# Patient Record
Sex: FEMALE | Race: WHITE | NOT HISPANIC OR LATINO | Employment: UNEMPLOYED | URBAN - METROPOLITAN AREA
[De-identification: names, ages, dates, MRNs, and addresses within clinical notes are randomized per-mention and may not be internally consistent; named-entity substitution may affect disease eponyms.]

---

## 2019-01-22 PROBLEM — S52.322A CLOSED DISPLACED TRANSVERSE FRACTURE OF SHAFT OF LEFT RADIUS: Status: ACTIVE | Noted: 2019-01-22

## 2019-02-05 RX ORDER — OXYCODONE AND ACETAMINOPHEN 10; 325 MG/1; MG/1
1 TABLET ORAL EVERY 4 HOURS PRN
Qty: 42 TABLET | Refills: 0 | Status: CANCELLED | OUTPATIENT
Start: 2019-02-05

## 2019-02-06 RX ORDER — OXYCODONE AND ACETAMINOPHEN 5; 325 MG/1; MG/1
1 TABLET ORAL EVERY 8 HOURS PRN
Qty: 32 TABLET | Refills: 0 | Status: SHIPPED | OUTPATIENT
Start: 2019-02-06 | End: 2019-05-14

## 2019-02-11 ENCOUNTER — HOSPITAL ENCOUNTER (OUTPATIENT)
Dept: RADIOLOGY | Facility: HOSPITAL | Age: 34
Discharge: HOME | End: 2019-02-11
Attending: ORTHOPAEDIC SURGERY
Payer: COMMERCIAL

## 2019-02-11 ENCOUNTER — OFFICE VISIT (OUTPATIENT)
Dept: ORTHOPEDICS | Facility: CLINIC | Age: 34
End: 2019-02-11
Payer: COMMERCIAL

## 2019-02-11 VITALS
HEART RATE: 108 BPM | HEIGHT: 62 IN | WEIGHT: 233.44 LBS | DIASTOLIC BLOOD PRESSURE: 127 MMHG | SYSTOLIC BLOOD PRESSURE: 197 MMHG | BODY MASS INDEX: 42.96 KG/M2

## 2019-02-11 DIAGNOSIS — S52.322A CLOSED DISPLACED TRANSVERSE FRACTURE OF SHAFT OF LEFT RADIUS, INITIAL ENCOUNTER: ICD-10-CM

## 2019-02-11 DIAGNOSIS — S52.322D CLOSED DISPLACED TRANSVERSE FRACTURE OF SHAFT OF LEFT RADIUS WITH ROUTINE HEALING, SUBSEQUENT ENCOUNTER: Primary | ICD-10-CM

## 2019-02-11 DIAGNOSIS — S52.322A CLOSED DISPLACED TRANSVERSE FRACTURE OF SHAFT OF LEFT RADIUS, INITIAL ENCOUNTER: Primary | ICD-10-CM

## 2019-02-11 PROCEDURE — 99024 POSTOP FOLLOW-UP VISIT: CPT | Mod: S$GLB,,, | Performed by: ORTHOPAEDIC SURGERY

## 2019-02-11 PROCEDURE — 73090 X-RAY EXAM OF FOREARM: CPT | Mod: 26,LT,, | Performed by: RADIOLOGY

## 2019-02-11 PROCEDURE — 99024 PR POST-OP FOLLOW-UP VISIT: ICD-10-PCS | Mod: S$GLB,,, | Performed by: ORTHOPAEDIC SURGERY

## 2019-02-11 PROCEDURE — 73090 X-RAY EXAM OF FOREARM: CPT | Mod: TC,PO,LT

## 2019-02-11 PROCEDURE — 99999 PR PBB SHADOW E&M-EST. PATIENT-LVL III: CPT | Mod: PBBFAC,,, | Performed by: ORTHOPAEDIC SURGERY

## 2019-02-11 PROCEDURE — 99999 PR PBB SHADOW E&M-EST. PATIENT-LVL III: ICD-10-PCS | Mod: PBBFAC,,, | Performed by: ORTHOPAEDIC SURGERY

## 2019-02-11 PROCEDURE — 73090 XR FOREARM LEFT: ICD-10-PCS | Mod: 26,LT,, | Performed by: RADIOLOGY

## 2019-02-11 RX ORDER — CELECOXIB 200 MG/1
200 CAPSULE ORAL DAILY
Refills: 5 | Status: ON HOLD | COMMUNITY
Start: 2019-01-15 | End: 2019-08-02 | Stop reason: HOSPADM

## 2019-02-11 RX ORDER — LINAGLIPTIN 5 MG/1
5 TABLET, FILM COATED ORAL DAILY
Refills: 5 | COMMUNITY
Start: 2019-01-15 | End: 2019-07-17

## 2019-02-11 RX ORDER — METFORMIN HYDROCHLORIDE 1000 MG/1
1000 TABLET ORAL 2 TIMES DAILY
Refills: 5 | COMMUNITY
Start: 2019-01-15 | End: 2019-05-14

## 2019-02-11 RX ORDER — LITHIUM CARBONATE 450 MG/1
450 TABLET ORAL 2 TIMES DAILY
Refills: 2 | COMMUNITY
Start: 2018-12-06 | End: 2019-05-14

## 2019-02-11 RX ORDER — PROPRANOLOL HYDROCHLORIDE 10 MG/1
10 TABLET ORAL 2 TIMES DAILY
Refills: 7 | COMMUNITY
Start: 2018-12-06 | End: 2019-05-14

## 2019-02-11 NOTE — PROGRESS NOTES
Subjective:      Patient ID: Alisha Kirk is a 33 y.o. female.    Chief Complaint: Post-op Evaluation of the Left Forearm and Post-op Evaluation (s/p ORIF radial shaft fx 19)    Doing fairly well today. She rates her pain as 7/10 today.     Social History     Occupational History    Not on file   Tobacco Use    Smoking status: Former Smoker     Packs/day: 0.50     Years: 3.00     Pack years: 1.50     Types: Cigarettes     Last attempt to quit:      Years since quittin.1    Smokeless tobacco: Never Used   Substance and Sexual Activity    Alcohol use: Yes     Comment: One glass of wine once a month    Drug use: No    Sexual activity: Yes     Partners: Male     Birth control/protection: None            Objective:    Ortho Exam     LUE: good motion of the fingers and the wrist. Incision is well healed. + ttp at the fracture site.       XRAYS: 2 views of left forearm obtained and reviewed today reveal good alignment Hardware is intact  .       Assessment:         s/p ORIF left radial shaft frx  Plan:     Pt's blood pressure was 197/127 today. I recommended she go to Urgent care or ER to have it treated.      We performed a custom orthotic/brace adjustment, fitting and training with the patient today. The patient demonstrated understanding and proper care. This was performed for 15 minutes.  Long titan wrist brace  was given.   Ok to return to work, no lifting. F/u 3 weeks with xrays 2 views left forearm.

## 2019-02-12 ENCOUNTER — PATIENT MESSAGE (OUTPATIENT)
Dept: ORTHOPEDICS | Facility: CLINIC | Age: 34
End: 2019-02-12

## 2019-02-12 RX ORDER — TRAMADOL HYDROCHLORIDE 50 MG/1
50 TABLET ORAL EVERY 8 HOURS PRN
Qty: 42 TABLET | Refills: 0 | Status: SHIPPED | OUTPATIENT
Start: 2019-02-12 | End: 2019-02-26 | Stop reason: SDUPTHER

## 2019-02-12 RX ORDER — OXYCODONE AND ACETAMINOPHEN 5; 325 MG/1; MG/1
1 TABLET ORAL EVERY 8 HOURS PRN
Qty: 32 TABLET | Refills: 0 | Status: CANCELLED | OUTPATIENT
Start: 2019-02-12

## 2019-02-26 RX ORDER — TRAMADOL HYDROCHLORIDE 50 MG/1
50 TABLET ORAL EVERY 8 HOURS PRN
Qty: 42 TABLET | Refills: 0 | Status: SHIPPED | OUTPATIENT
Start: 2019-02-26 | End: 2019-05-14

## 2019-03-04 DIAGNOSIS — S52.322D CLOSED DISPLACED TRANSVERSE FRACTURE OF SHAFT OF LEFT RADIUS WITH ROUTINE HEALING, SUBSEQUENT ENCOUNTER: Primary | ICD-10-CM

## 2019-03-07 ENCOUNTER — OFFICE VISIT (OUTPATIENT)
Dept: ORTHOPEDICS | Facility: CLINIC | Age: 34
End: 2019-03-07
Payer: COMMERCIAL

## 2019-03-07 ENCOUNTER — HOSPITAL ENCOUNTER (OUTPATIENT)
Dept: RADIOLOGY | Facility: HOSPITAL | Age: 34
Discharge: HOME | End: 2019-03-07
Attending: ORTHOPAEDIC SURGERY
Payer: COMMERCIAL

## 2019-03-07 VITALS
BODY MASS INDEX: 42.96 KG/M2 | DIASTOLIC BLOOD PRESSURE: 84 MMHG | WEIGHT: 233.44 LBS | SYSTOLIC BLOOD PRESSURE: 123 MMHG | HEART RATE: 67 BPM | HEIGHT: 62 IN

## 2019-03-07 DIAGNOSIS — S52.322D CLOSED DISPLACED TRANSVERSE FRACTURE OF SHAFT OF LEFT RADIUS WITH ROUTINE HEALING, SUBSEQUENT ENCOUNTER: ICD-10-CM

## 2019-03-07 DIAGNOSIS — S52.322D CLOSED DISPLACED TRANSVERSE FRACTURE OF SHAFT OF LEFT RADIUS WITH ROUTINE HEALING, SUBSEQUENT ENCOUNTER: Primary | ICD-10-CM

## 2019-03-07 PROCEDURE — 73090 XR FOREARM LEFT: ICD-10-PCS | Mod: 26,LT,, | Performed by: RADIOLOGY

## 2019-03-07 PROCEDURE — 99999 PR PBB SHADOW E&M-EST. PATIENT-LVL III: ICD-10-PCS | Mod: PBBFAC,,, | Performed by: ORTHOPAEDIC SURGERY

## 2019-03-07 PROCEDURE — 73090 X-RAY EXAM OF FOREARM: CPT | Mod: TC,PO,LT

## 2019-03-07 PROCEDURE — 99999 PR PBB SHADOW E&M-EST. PATIENT-LVL III: CPT | Mod: PBBFAC,,, | Performed by: ORTHOPAEDIC SURGERY

## 2019-03-07 PROCEDURE — 99024 PR POST-OP FOLLOW-UP VISIT: ICD-10-PCS | Mod: S$GLB,,, | Performed by: ORTHOPAEDIC SURGERY

## 2019-03-07 PROCEDURE — 73090 X-RAY EXAM OF FOREARM: CPT | Mod: 26,LT,, | Performed by: RADIOLOGY

## 2019-03-07 PROCEDURE — 99024 POSTOP FOLLOW-UP VISIT: CPT | Mod: S$GLB,,, | Performed by: ORTHOPAEDIC SURGERY

## 2019-03-07 RX ORDER — LEVOTHYROXINE SODIUM 50 UG/1
50 TABLET ORAL DAILY
Refills: 2 | COMMUNITY
Start: 2019-02-12

## 2019-03-07 NOTE — PROGRESS NOTES
Subjective:      Patient ID: Alisha Kirk is a 33 y.o. female.    Chief Complaint: Post-op Evaluation of the Left Forearm and Post-op Evaluation (s/p ORIF radial shaft fx 19)    Doing  well today. She rates her pain as 4/10 today.   She says she is doing well at work.   Social History     Occupational History    Not on file   Tobacco Use    Smoking status: Former Smoker     Packs/day: 0.50     Years: 3.00     Pack years: 1.50     Types: Cigarettes     Last attempt to quit:      Years since quittin.1    Smokeless tobacco: Never Used   Substance and Sexual Activity    Alcohol use: Yes     Comment: One glass of wine once a month    Drug use: No    Sexual activity: Yes     Partners: Male     Birth control/protection: None            Objective:    Ortho Exam     LUE: Mildly decreased motion of the fingers and the wrist. Incision is well healed. Minimal ttp at the fracture site.       XRAYS: 2 views of left forearm obtained and reviewed today reveal good alignment Hardware is intact  . There is some interval progression of healing.       Assessment:         s/p ORIF left radial shaft frx  Plan:       OT . No passive pronation. F/u 6 weeks with xray of the left forearm.

## 2019-04-16 DIAGNOSIS — S52.322D CLOSED DISPLACED TRANSVERSE FRACTURE OF SHAFT OF LEFT RADIUS WITH ROUTINE HEALING, SUBSEQUENT ENCOUNTER: Primary | ICD-10-CM

## 2019-04-17 ENCOUNTER — PATIENT MESSAGE (OUTPATIENT)
Dept: ORTHOPEDICS | Facility: CLINIC | Age: 34
End: 2019-04-17

## 2019-05-08 ENCOUNTER — PATIENT MESSAGE (OUTPATIENT)
Dept: ORTHOPEDICS | Facility: CLINIC | Age: 34
End: 2019-05-08

## 2019-05-14 ENCOUNTER — HOSPITAL ENCOUNTER (OUTPATIENT)
Dept: RADIOLOGY | Facility: HOSPITAL | Age: 34
Discharge: HOME | End: 2019-05-14
Attending: ORTHOPAEDIC SURGERY
Payer: COMMERCIAL

## 2019-05-14 ENCOUNTER — OFFICE VISIT (OUTPATIENT)
Dept: ORTHOPEDICS | Facility: CLINIC | Age: 34
End: 2019-05-14
Payer: COMMERCIAL

## 2019-05-14 VITALS
HEART RATE: 89 BPM | SYSTOLIC BLOOD PRESSURE: 133 MMHG | BODY MASS INDEX: 42.96 KG/M2 | DIASTOLIC BLOOD PRESSURE: 89 MMHG | WEIGHT: 233.44 LBS | HEIGHT: 62 IN

## 2019-05-14 DIAGNOSIS — S52.322D CLOSED DISPLACED TRANSVERSE FRACTURE OF SHAFT OF LEFT RADIUS WITH ROUTINE HEALING, SUBSEQUENT ENCOUNTER: Primary | ICD-10-CM

## 2019-05-14 DIAGNOSIS — S52.322D CLOSED DISPLACED TRANSVERSE FRACTURE OF SHAFT OF LEFT RADIUS WITH ROUTINE HEALING, SUBSEQUENT ENCOUNTER: ICD-10-CM

## 2019-05-14 PROCEDURE — 3008F PR BODY MASS INDEX (BMI) DOCUMENTED: ICD-10-PCS | Mod: CPTII,S$GLB,, | Performed by: ORTHOPAEDIC SURGERY

## 2019-05-14 PROCEDURE — 73090 X-RAY EXAM OF FOREARM: CPT | Mod: TC,PO,LT

## 2019-05-14 PROCEDURE — 99214 PR OFFICE/OUTPT VISIT, EST, LEVL IV, 30-39 MIN: ICD-10-PCS | Mod: S$GLB,,, | Performed by: ORTHOPAEDIC SURGERY

## 2019-05-14 PROCEDURE — 99214 OFFICE O/P EST MOD 30 MIN: CPT | Mod: S$GLB,,, | Performed by: ORTHOPAEDIC SURGERY

## 2019-05-14 PROCEDURE — 73090 X-RAY EXAM OF FOREARM: CPT | Mod: 26,LT,, | Performed by: RADIOLOGY

## 2019-05-14 PROCEDURE — 99999 PR PBB SHADOW E&M-EST. PATIENT-LVL III: CPT | Mod: PBBFAC,,, | Performed by: ORTHOPAEDIC SURGERY

## 2019-05-14 PROCEDURE — 99999 PR PBB SHADOW E&M-EST. PATIENT-LVL III: ICD-10-PCS | Mod: PBBFAC,,, | Performed by: ORTHOPAEDIC SURGERY

## 2019-05-14 PROCEDURE — 3008F BODY MASS INDEX DOCD: CPT | Mod: CPTII,S$GLB,, | Performed by: ORTHOPAEDIC SURGERY

## 2019-05-14 PROCEDURE — 73090 XR FOREARM LEFT: ICD-10-PCS | Mod: 26,LT,, | Performed by: RADIOLOGY

## 2019-05-14 RX ORDER — RISPERIDONE 2 MG/1
2 TABLET ORAL DAILY
Refills: 0 | COMMUNITY
Start: 2019-05-07 | End: 2019-12-19

## 2019-05-14 RX ORDER — BUSPIRONE HYDROCHLORIDE 15 MG/1
15 TABLET ORAL 3 TIMES DAILY
Refills: 0 | COMMUNITY
Start: 2019-05-07 | End: 2020-05-14

## 2019-05-14 RX ORDER — ERGOCALCIFEROL 1.25 MG/1
50000 CAPSULE ORAL
Refills: 0 | COMMUNITY
Start: 2019-04-18

## 2019-05-14 RX ORDER — ESCITALOPRAM OXALATE 20 MG/1
20 TABLET ORAL DAILY
COMMUNITY
Start: 2019-05-13 | End: 2020-05-14

## 2019-05-14 RX ORDER — GLYBURIDE 2.5 MG/1
2.5 TABLET ORAL
Refills: 0 | COMMUNITY
Start: 2019-04-26 | End: 2019-07-17

## 2019-05-14 RX ORDER — OXCARBAZEPINE 600 MG/1
600 TABLET, FILM COATED ORAL 2 TIMES DAILY
COMMUNITY

## 2019-05-14 NOTE — PROGRESS NOTES
Subjective:      Patient ID: Alisha Kirk is a 34 y.o. female.    Chief Complaint: Post-op Evaluation of the Left Forearm and Post-op Evaluation (s/p ORIF radial shaft fx 19)    Doing well today. She rates her pain as 0/10 today. She has missed her last 3 OT visits due to illness.     Social History     Occupational History    Not on file   Tobacco Use    Smoking status: Former Smoker     Packs/day: 0.50     Years: 3.00     Pack years: 1.50     Types: Cigarettes     Last attempt to quit:      Years since quittin.3    Smokeless tobacco: Never Used   Substance and Sexual Activity    Alcohol use: Yes     Comment: One glass of wine once a month    Drug use: No    Sexual activity: Yes     Partners: Male     Birth control/protection: None            Objective:    Ortho Exam     LUE: Mildly decreased motion of  the wrist. Incision is well healed. Minimal ttp at the fracture site.       XRAYS: 2 views of left forearm obtained and reviewed today reveal good alignment Hardware is intact  . There is minimal interval progression of healing and incomplete osseous bridging.       Assessment:         s/p ORIF left radial shaft frx  Plan:        F/u 2 months with xray of the left forearm.

## 2019-05-28 ENCOUNTER — TELEPHONE (OUTPATIENT)
Dept: ORTHOPEDICS | Facility: CLINIC | Age: 34
End: 2019-05-28

## 2019-05-28 ENCOUNTER — OFFICE VISIT (OUTPATIENT)
Dept: ORTHOPEDICS | Facility: CLINIC | Age: 34
End: 2019-05-28
Payer: COMMERCIAL

## 2019-05-28 ENCOUNTER — PATIENT MESSAGE (OUTPATIENT)
Dept: ORTHOPEDICS | Facility: CLINIC | Age: 34
End: 2019-05-28

## 2019-05-28 ENCOUNTER — HOSPITAL ENCOUNTER (OUTPATIENT)
Dept: RADIOLOGY | Facility: HOSPITAL | Age: 34
Discharge: HOME | End: 2019-05-28
Attending: ORTHOPAEDIC SURGERY
Payer: COMMERCIAL

## 2019-05-28 VITALS
HEIGHT: 62 IN | SYSTOLIC BLOOD PRESSURE: 128 MMHG | BODY MASS INDEX: 42.88 KG/M2 | WEIGHT: 233 LBS | HEART RATE: 67 BPM | DIASTOLIC BLOOD PRESSURE: 69 MMHG

## 2019-05-28 DIAGNOSIS — S52.322D CLOSED DISPLACED TRANSVERSE FRACTURE OF SHAFT OF LEFT RADIUS WITH ROUTINE HEALING, SUBSEQUENT ENCOUNTER: Primary | ICD-10-CM

## 2019-05-28 DIAGNOSIS — S52.322D CLOSED DISPLACED TRANSVERSE FRACTURE OF SHAFT OF LEFT RADIUS WITH ROUTINE HEALING, SUBSEQUENT ENCOUNTER: ICD-10-CM

## 2019-05-28 DIAGNOSIS — S52.322K CLOSED DISPLACED TRANSVERSE FRACTURE OF SHAFT OF LEFT RADIUS WITH NONUNION, SUBSEQUENT ENCOUNTER: Primary | ICD-10-CM

## 2019-05-28 PROCEDURE — 99999 PR PBB SHADOW E&M-EST. PATIENT-LVL III: CPT | Mod: PBBFAC,,, | Performed by: ORTHOPAEDIC SURGERY

## 2019-05-28 PROCEDURE — 3008F BODY MASS INDEX DOCD: CPT | Mod: CPTII,S$GLB,, | Performed by: ORTHOPAEDIC SURGERY

## 2019-05-28 PROCEDURE — 73090 XR FOREARM LEFT: ICD-10-PCS | Mod: 26,LT,, | Performed by: RADIOLOGY

## 2019-05-28 PROCEDURE — 99214 PR OFFICE/OUTPT VISIT, EST, LEVL IV, 30-39 MIN: ICD-10-PCS | Mod: S$GLB,,, | Performed by: ORTHOPAEDIC SURGERY

## 2019-05-28 PROCEDURE — 73090 X-RAY EXAM OF FOREARM: CPT | Mod: 26,LT,, | Performed by: RADIOLOGY

## 2019-05-28 PROCEDURE — 3008F PR BODY MASS INDEX (BMI) DOCUMENTED: ICD-10-PCS | Mod: CPTII,S$GLB,, | Performed by: ORTHOPAEDIC SURGERY

## 2019-05-28 PROCEDURE — 99214 OFFICE O/P EST MOD 30 MIN: CPT | Mod: S$GLB,,, | Performed by: ORTHOPAEDIC SURGERY

## 2019-05-28 PROCEDURE — 73090 X-RAY EXAM OF FOREARM: CPT | Mod: TC,PO,LT

## 2019-05-28 PROCEDURE — 99999 PR PBB SHADOW E&M-EST. PATIENT-LVL III: ICD-10-PCS | Mod: PBBFAC,,, | Performed by: ORTHOPAEDIC SURGERY

## 2019-05-28 NOTE — TELEPHONE ENCOUNTER
----- Message from Teresita Jensen sent at 5/28/2019  3:41 PM CDT -----  Contact: Tommie pereira  Type:  RX Refill Request    Who Called:  Tommie  Refill or New Rx:  new  RX Name and Strength:  For arm pain  How is the patient currently taking it? (ex. 1XDay):  n/a  Is this a 30 day or 90 day RX:  n/a  Preferred Pharmacy with phone number:    [facility]  R[address]SE[address]  [address] [place] [address]  Phone: [phone] Fax: [phone]      Local or Mail Order:  local  Ordering Provider:  Abdulkadir Barnes Call Back Number:  [phone]  Additional Information:  Pt forgot to ask for something for her arm pain at appt. Pls call regarding sending her something

## 2019-05-28 NOTE — PROGRESS NOTES
Subjective:      Patient ID:  Reagan is a 34 y.o. female.    Chief Complaint: Post-op Evaluation (s/p ORIF forearm 19)    Pt is here for f/u today. She rates her pain as 9/10 today. She says it has been painful the past few days and she has swelling of the hand and tingling on the dorsum of the hand that she never had before.     Social History     Occupational History    Not on file   Tobacco Use    Smoking status: Former Smoker     Packs/day: 0.50     Years: 3.00     Pack years: 1.50     Types: Cigarettes     Last attempt to quit:      Years since quittin.4    Smokeless tobacco: Never Used   Substance and Sexual Activity    Alcohol use: Yes     Comment: One glass of wine once a month    Drug use: No    Sexual activity: Yes     Partners: Male     Birth control/protection: None            Objective:    Ortho Exam     LUE: Mildly decreased motion of  the wrist. Incision is well healed. Mild ttp at the fracture site.       XRAYS: 2 views of left forearm obtained and reviewed today reveal good alignment Hardware is intact  There is no interval progression of healing and incomplete osseous bridging.       Assessment:         s/p ORIF left radial shaft frx  Plan:        I ordered a bone stimulator to assist with bone healing. She will f/u with Dr. Candelario when she has been using the bone stimulator for a month.

## 2019-05-28 NOTE — TELEPHONE ENCOUNTER
Spoke to patient's . Advised that the policy is that Dr. Panchal does not prescribe narcotics after 90 days past surgery date. Instructed to contact [facility] physician.  stated understanding.

## 2019-05-30 RX ORDER — IBUPROFEN 800 MG/1
800 TABLET ORAL 3 TIMES DAILY
Qty: 60 TABLET | Refills: 1 | Status: ON HOLD | OUTPATIENT
Start: 2019-05-30 | End: 2019-07-22 | Stop reason: HOSPADM

## 2019-05-31 ENCOUNTER — TELEPHONE (OUTPATIENT)
Dept: ORTHOPEDICS | Facility: CLINIC | Age: 34
End: 2019-05-31

## 2019-05-31 NOTE — TELEPHONE ENCOUNTER
----- Message from Shirin Giordano sent at 5/31/2019  1:09 PM CDT -----  Contact: patient spouse eleno [phone]  patient spouse eleno [phone]  Requesting a call from the nurse   Patient is at the [facility], due to her arm

## 2019-05-31 NOTE — TELEPHONE ENCOUNTER
Spoke to . Advised that I just sent a Mychart message to pt to go to Er.  states they are at [facility] and waiting to be seen by the dr. Advised to call back if any further questions.

## 2019-06-16 ENCOUNTER — PATIENT MESSAGE (OUTPATIENT)
Dept: ORTHOPEDICS | Facility: CLINIC | Age: 34
End: 2019-06-16

## 2019-06-17 ENCOUNTER — PATIENT MESSAGE (OUTPATIENT)
Dept: ORTHOPEDICS | Facility: CLINIC | Age: 34
End: 2019-06-17

## 2019-07-11 ENCOUNTER — PATIENT MESSAGE (OUTPATIENT)
Dept: ORTHOPEDICS | Facility: CLINIC | Age: 34
End: 2019-07-11

## 2019-07-17 DIAGNOSIS — S52.322D CLOSED DISPLACED TRANSVERSE FRACTURE OF SHAFT OF LEFT RADIUS WITH ROUTINE HEALING, SUBSEQUENT ENCOUNTER: Primary | ICD-10-CM

## 2019-07-17 DIAGNOSIS — Z98.890 S/P ORIF (OPEN REDUCTION INTERNAL FIXATION) FRACTURE: ICD-10-CM

## 2019-07-17 DIAGNOSIS — Z87.81 S/P ORIF (OPEN REDUCTION INTERNAL FIXATION) FRACTURE: ICD-10-CM

## 2019-07-17 RX ORDER — ATORVASTATIN CALCIUM 20 MG/1
20 TABLET, FILM COATED ORAL NIGHTLY
Refills: 3 | COMMUNITY
Start: 2019-07-02 | End: 2022-03-08

## 2019-07-17 RX ORDER — GLUCOSAM/CHON-MSM1/C/MANG/BOSW 500-416.6
TABLET ORAL
Refills: 5 | COMMUNITY
Start: 2019-06-18

## 2019-07-17 RX ORDER — MONTELUKAST SODIUM 10 MG/1
10 TABLET ORAL NIGHTLY
Refills: 2 | COMMUNITY
Start: 2019-06-25

## 2019-07-17 RX ORDER — SILVER SULFADIAZINE 10 G/1000G
CREAM TOPICAL
Refills: 3 | COMMUNITY
Start: 2019-06-18 | End: 2019-12-19

## 2019-07-17 RX ORDER — NABUMETONE 750 MG/1
750 TABLET, FILM COATED ORAL 2 TIMES DAILY
Refills: 0 | COMMUNITY
Start: 2019-05-31 | End: 2019-12-19

## 2019-07-17 RX ORDER — MIRTAZAPINE 15 MG/1
15 TABLET, FILM COATED ORAL NIGHTLY
Refills: 2 | COMMUNITY
Start: 2019-06-29 | End: 2019-12-19

## 2019-07-17 RX ORDER — FLUTICASONE PROPIONATE 50 MCG
SPRAY, SUSPENSION (ML) NASAL
Refills: 1 | COMMUNITY
Start: 2019-06-25 | End: 2019-12-19

## 2019-07-17 RX ORDER — EMPAGLIFLOZIN 10 MG/1
TABLET, FILM COATED ORAL
Refills: 1 | COMMUNITY
Start: 2019-06-24 | End: 2022-05-16 | Stop reason: CLARIF

## 2019-07-18 ENCOUNTER — OFFICE VISIT (OUTPATIENT)
Dept: ORTHOPEDICS | Facility: CLINIC | Age: 34
End: 2019-07-18
Payer: COMMERCIAL

## 2019-07-18 ENCOUNTER — HOSPITAL ENCOUNTER (OUTPATIENT)
Dept: RADIOLOGY | Facility: HOSPITAL | Age: 34
Discharge: HOME | End: 2019-07-18
Attending: ORTHOPAEDIC SURGERY
Payer: COMMERCIAL

## 2019-07-18 VITALS
SYSTOLIC BLOOD PRESSURE: 119 MMHG | HEART RATE: 77 BPM | DIASTOLIC BLOOD PRESSURE: 85 MMHG | HEIGHT: 62 IN | BODY MASS INDEX: 42.88 KG/M2 | WEIGHT: 233 LBS

## 2019-07-18 DIAGNOSIS — Z87.81 S/P ORIF (OPEN REDUCTION INTERNAL FIXATION) FRACTURE: ICD-10-CM

## 2019-07-18 DIAGNOSIS — S52.322K CLOSED DISPLACED TRANSVERSE FRACTURE OF SHAFT OF LEFT RADIUS WITH NONUNION, SUBSEQUENT ENCOUNTER: Primary | ICD-10-CM

## 2019-07-18 DIAGNOSIS — Z98.890 S/P ORIF (OPEN REDUCTION INTERNAL FIXATION) FRACTURE: ICD-10-CM

## 2019-07-18 DIAGNOSIS — S52.322D CLOSED DISPLACED TRANSVERSE FRACTURE OF SHAFT OF LEFT RADIUS WITH ROUTINE HEALING, SUBSEQUENT ENCOUNTER: ICD-10-CM

## 2019-07-18 DIAGNOSIS — Z01.818 PRE-OP TESTING: ICD-10-CM

## 2019-07-18 PROCEDURE — 99214 PR OFFICE/OUTPT VISIT, EST, LEVL IV, 30-39 MIN: ICD-10-PCS | Mod: S$GLB,,, | Performed by: ORTHOPAEDIC SURGERY

## 2019-07-18 PROCEDURE — 73090 XR FOREARM LEFT: ICD-10-PCS | Mod: 26,LT,, | Performed by: RADIOLOGY

## 2019-07-18 PROCEDURE — 73090 X-RAY EXAM OF FOREARM: CPT | Mod: 26,LT,, | Performed by: RADIOLOGY

## 2019-07-18 PROCEDURE — 73090 X-RAY EXAM OF FOREARM: CPT | Mod: TC,PO,LT

## 2019-07-18 PROCEDURE — 3008F PR BODY MASS INDEX (BMI) DOCUMENTED: ICD-10-PCS | Mod: CPTII,S$GLB,, | Performed by: ORTHOPAEDIC SURGERY

## 2019-07-18 PROCEDURE — 99999 PR PBB SHADOW E&M-EST. PATIENT-LVL IV: ICD-10-PCS | Mod: PBBFAC,,, | Performed by: ORTHOPAEDIC SURGERY

## 2019-07-18 PROCEDURE — 3008F BODY MASS INDEX DOCD: CPT | Mod: CPTII,S$GLB,, | Performed by: ORTHOPAEDIC SURGERY

## 2019-07-18 PROCEDURE — 99999 PR PBB SHADOW E&M-EST. PATIENT-LVL IV: CPT | Mod: PBBFAC,,, | Performed by: ORTHOPAEDIC SURGERY

## 2019-07-18 PROCEDURE — 99214 OFFICE O/P EST MOD 30 MIN: CPT | Mod: S$GLB,,, | Performed by: ORTHOPAEDIC SURGERY

## 2019-07-18 RX ORDER — MEDROXYPROGESTERONE ACETATE 10 MG/1
TABLET ORAL
Refills: 2 | COMMUNITY
Start: 2019-07-12 | End: 2019-12-19

## 2019-07-18 RX ORDER — LIDOCAINE HYDROCHLORIDE 10 MG/ML
1 INJECTION, SOLUTION EPIDURAL; INFILTRATION; INTRACAUDAL; PERINEURAL ONCE
Status: CANCELLED | OUTPATIENT
Start: 2019-07-18 | End: 2019-07-18

## 2019-07-18 RX ORDER — SODIUM CHLORIDE 0.9 % (FLUSH) 0.9 %
3 SYRINGE (ML) INJECTION EVERY 6 HOURS PRN
Status: DISCONTINUED | OUTPATIENT
Start: 2019-07-18 | End: 2019-08-01 | Stop reason: ALTCHOICE

## 2019-07-18 NOTE — H&P (VIEW-ONLY)
Chief Complaint   Patient presents with    Left Forearm - Pain, Post-op Evaluation       HPI:   This is a 34 y.o. who returns today status post left radial shaft ORIF  6 months ago. Patient has been WBAT.  Pain is aching. No numbness or tingling. No associated signs or symptoms.    Past Medical History:   Diagnosis Date    Diabetes mellitus     Hypertension      Past Surgical History:   Procedure Laterality Date    ABDOMINAL SURGERY      ORIF, FRACTURE, RADIAL SHAFT Left 1/23/2019    Performed by Ravi Panchal MD at [facility] OR     Current Outpatient Medications on File Prior to Visit   Medication Sig Dispense Refill    atorvastatin (LIPITOR) 20 MG tablet TAKE ONE TABLET BY MOUTH AT BEDTIME; Instr DOSE increased TO 20mg, discontinue 10mg  3    busPIRone (BUSPAR) 15 MG tablet Take 15 mg by mouth 3 (three) times daily.  0    celecoxib (CELEBREX) 200 MG capsule Take 200 mg by mouth once daily.  5    ergocalciferol (ERGOCALCIFEROL) 50,000 unit Cap Take 50,000 Units by mouth every 7 days.  0    escitalopram oxalate (LEXAPRO) 20 MG tablet Take 20 mg by mouth once daily.      fluticasone propionate (FLONASE) 50 mcg/actuation nasal spray ADMINISTER ONE SPRAY IN EACH NOSTRIL TWICE DAILY  1    ibuprofen (ADVIL,MOTRIN) 800 MG tablet Take 1 tablet (800 mg total) by mouth 3 (three) times daily. 60 tablet 1    JARDIANCE 10 mg Tab TAKE ONE TABLET BY MOUTH EVERY DAY IN THE MORNING STOP TRADJENTA  1    levothyroxine (SYNTHROID) 50 MCG tablet Take 50 mcg by mouth once daily.  2    medroxyPROGESTERone (PROVERA) 10 MG tablet TAKE ONE TABLET BY MOUTH EVERY DAY FOR 10 DAYS ON DAYS 15-24 of menses.  2    metoprolol tartrate (LOPRESSOR) 50 MG tablet Take 50 mg by mouth 2 (two) times daily.      mirtazapine (REMERON) 15 MG tablet Take 15 mg by mouth nightly.  2    montelukast (SINGULAIR) 10 mg tablet Take 10 mg by mouth every evening.  2    nabumetone (RELAFEN) 750 MG tablet Take 750 mg by mouth 2 (two) times daily.  0     OXcarbazepine (TRILEPTAL) 600 MG Tab Take 150 mg by mouth 2 (two) times daily.      risperiDONE (RISPERDAL) 2 MG tablet Take 2 mg by mouth once daily.  0    silver sulfADIAZINE 1% (SILVADENE) 1 % cream APPLY TO THE AFFECTED AREA(S) TWICE DAILY  3    spironolactone (ALDACTONE) 25 MG tablet Take 25 mg by mouth 2 (two) times daily.      TRUEPLUS LANCETS 30 gauge Misc USE TO CHECK BLOOD SUGAR FIVE TIMES DAILY  5     No current facility-administered medications on file prior to visit.      Review of patient's allergies indicates:   Allergen Reactions    Keppra [levetiracetam]      Makes aggressive    Tramadol      Makes aggressive    Xanax [alprazolam] Other (See Comments)     seizures     History reviewed. No pertinent family history.  Social History     Socioeconomic History    Marital status:      Spouse name: Not on file    Number of children: Not on file    Years of education: Not on file    Highest education level: Not on file   Occupational History    Not on file   Social Needs    Financial resource strain: Not on file    Food insecurity:     Worry: Not on file     Inability: Not on file    Transportation needs:     Medical: Not on file     Non-medical: Not on file   Tobacco Use    Smoking status: Former Smoker     Packs/day: 0.50     Years: 3.00     Pack years: 1.50     Types: Cigarettes     Last attempt to quit:      Years since quittin.5    Smokeless tobacco: Never Used   Substance and Sexual Activity    Alcohol use: Yes     Comment: One glass of wine once a month    Drug use: No    Sexual activity: Yes     Partners: Male     Birth control/protection: None   Lifestyle    Physical activity:     Days per week: Not on file     Minutes per session: Not on file    Stress: Not on file   Relationships    Social connections:     Talks on phone: Not on file     Gets together: Not on file     Attends Latter day service: Not on file     Active member of club or organization: Not on  file     Attends meetings of clubs or organizations: Not on file     Relationship status: Not on file   Other Topics Concern    Not on file   Social History Narrative    Not on file       Review of Systems:  Constitutional:  Denies fever or chills   Eyes:  Denies change in visual acuity   HENT:  Denies nasal congestion or sore throat   Respiratory:  Denies cough or shortness of breath   Cardiovascular:  Denies chest pain or edema   GI:  Denies abdominal pain, nausea, vomiting, bloody stools or diarrhea   :  Denies dysuria   Integument:  Denies rash   Neurologic:  Denies headache, focal weakness or sensory changes   Endocrine:  Denies polyuria or polydipsia   Lymphatic:  Denies swollen glands   Psychiatric:  Denies depression or anxiety     Physical Exam:   Constitutional:  Well developed, well nourished, no acute distress, non-toxic appearance   Integument:  Well hydrated, no rash   Lymphatic:  No lymphadenopathy noted   Neurologic:  Alert & oriented x 3  Psychiatric:  Speech and behavior appropriate   Right upper extremity  Exam performed same as contralateral side and is normal    Left upper extremity    Dorsal incision healed. No erythema or fluctuance. Overall normal alignment. Mild point TTP about the fracture site. Compartments soft. Skin intact. NVI distally.        X-rays were performed today, personally reviewed by me and findings discussed with the patient.  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 They voiced understanding and wish to proceed with surgery July 22. Consents signed in clinic.

## 2019-07-18 NOTE — H&P
Chief Complaint   Patient presents with    Left Forearm - Pain, Post-op Evaluation       HPI:   This is a 34 y.o. who returns today status post left radial shaft ORIF  6 months ago. Patient has been WBAT.  Pain is aching. No numbness or tingling. No associated signs or symptoms.    Past Medical History:   Diagnosis Date    Diabetes mellitus     Hypertension      Past Surgical History:   Procedure Laterality Date    ABDOMINAL SURGERY      ORIF, FRACTURE, RADIAL SHAFT Left 1/23/2019    Performed by Ravi Panchal MD at [facility] OR     Current Outpatient Medications on File Prior to Visit   Medication Sig Dispense Refill    atorvastatin (LIPITOR) 20 MG tablet TAKE ONE TABLET BY MOUTH AT BEDTIME; Instr DOSE increased TO 20mg, discontinue 10mg  3    busPIRone (BUSPAR) 15 MG tablet Take 15 mg by mouth 3 (three) times daily.  0    celecoxib (CELEBREX) 200 MG capsule Take 200 mg by mouth once daily.  5    ergocalciferol (ERGOCALCIFEROL) 50,000 unit Cap Take 50,000 Units by mouth every 7 days.  0    escitalopram oxalate (LEXAPRO) 20 MG tablet Take 20 mg by mouth once daily.      fluticasone propionate (FLONASE) 50 mcg/actuation nasal spray ADMINISTER ONE SPRAY IN EACH NOSTRIL TWICE DAILY  1    ibuprofen (ADVIL,MOTRIN) 800 MG tablet Take 1 tablet (800 mg total) by mouth 3 (three) times daily. 60 tablet 1    JARDIANCE 10 mg Tab TAKE ONE TABLET BY MOUTH EVERY DAY IN THE MORNING STOP TRADJENTA  1    levothyroxine (SYNTHROID) 50 MCG tablet Take 50 mcg by mouth once daily.  2    medroxyPROGESTERone (PROVERA) 10 MG tablet TAKE ONE TABLET BY MOUTH EVERY DAY FOR 10 DAYS ON DAYS 15-24 of menses.  2    metoprolol tartrate (LOPRESSOR) 50 MG tablet Take 50 mg by mouth 2 (two) times daily.      mirtazapine (REMERON) 15 MG tablet Take 15 mg by mouth nightly.  2    montelukast (SINGULAIR) 10 mg tablet Take 10 mg by mouth every evening.  2    nabumetone (RELAFEN) 750 MG tablet Take 750 mg by mouth 2 (two) times daily.  0  Jaun Love  1954   Chief Complaint   Patient presents with    Wrist Pain     Left        HISTORY OF PRESENT ILLNESS  Jaun Love is a 57 y.o. female who presents today for reevaluation of left wrist pain. Today she presents in a cast. States her pain is a 0/10. Her date of injury was around 3/21/17. She presents to the office wearing her removable splint today. She has been attending OT with some improvement in her stiffness and mobility. She has completed an MRI of the wrist. Patient denies any fever, chills, chest pain, shortness of breath or calf pain. There are no new illness or injuries to report since last seen in the office. There are no changes to medications, allergies, family or social history. PHYSICAL EXAM:   Visit Vitals    /77    Pulse 93    Ht 5' 3\" (1.6 m)    Wt 137 lb (62.1 kg)    BMI 24.27 kg/m2     The patient is a well-developed, well-nourished female   in no acute distress. The patient is alert and oriented times three. The patient is alert and oriented times three. Mood and affect are normal.  LYMPHATIC: lymph nodes are not enlarged and are within normal limits  SKIN: normal in color and non tender to palpation. There are no bruises or abrasions noted. NEUROLOGICAL: Motor sensory exam is within normal limits. Reflexes are equal bilaterally.  There is normal sensation to pinprick and light touch  MUSCULOSKELETAL:  Examination Left Hand   Skin Intact   Deformity -   Swelling -   Tenderness -   Tenderness A1 Pulley -   Finger flexion Full   Finger extension Full   Thenar Eminence Atrophy -   Sensation Normal   Capillary refill -   Heberden's nodes -   Dupuytren's -     Examination Left Wrist   Skin Intact   Tenderness No ttp   Flexion 30   Extension 30   Deformity -   Effusion -   Tinnel's sign -   Phalen's test -   Finklestein maneuver -   Pain with thumb abduction -     IMAGING: Previous 3 view xray of left wrist reveal small increase of    OXcarbazepine (TRILEPTAL) 600 MG Tab Take 150 mg by mouth 2 (two) times daily.      risperiDONE (RISPERDAL) 2 MG tablet Take 2 mg by mouth once daily.  0    silver sulfADIAZINE 1% (SILVADENE) 1 % cream APPLY TO THE AFFECTED AREA(S) TWICE DAILY  3    spironolactone (ALDACTONE) 25 MG tablet Take 25 mg by mouth 2 (two) times daily.      TRUEPLUS LANCETS 30 gauge Misc USE TO CHECK BLOOD SUGAR FIVE TIMES DAILY  5     No current facility-administered medications on file prior to visit.      Review of patient's allergies indicates:   Allergen Reactions    Keppra [levetiracetam]      Makes aggressive    Tramadol      Makes aggressive    Xanax [alprazolam] Other (See Comments)     seizures     History reviewed. No pertinent family history.  Social History     Socioeconomic History    Marital status:      Spouse name: Not on file    Number of children: Not on file    Years of education: Not on file    Highest education level: Not on file   Occupational History    Not on file   Social Needs    Financial resource strain: Not on file    Food insecurity:     Worry: Not on file     Inability: Not on file    Transportation needs:     Medical: Not on file     Non-medical: Not on file   Tobacco Use    Smoking status: Former Smoker     Packs/day: 0.50     Years: 3.00     Pack years: 1.50     Types: Cigarettes     Last attempt to quit:      Years since quittin.5    Smokeless tobacco: Never Used   Substance and Sexual Activity    Alcohol use: Yes     Comment: One glass of wine once a month    Drug use: No    Sexual activity: Yes     Partners: Male     Birth control/protection: None   Lifestyle    Physical activity:     Days per week: Not on file     Minutes per session: Not on file    Stress: Not on file   Relationships    Social connections:     Talks on phone: Not on file     Gets together: Not on file     Attends Mu-ism service: Not on file     Active member of club or organization: Not on  calcification along scaphoid      IMAGING: MRI of the left wrist dated 3/25/17 was reviewed and read:   IMPRESSION:  1. Partially healing scaphoid waist fracture. 2. 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 3. Mild ECU tendinosis. 4. Mild extensor digitorum tenosynovitis distal to the radius. 5. Mild distal radioulnar joint effusion with likely small loose body. Mild  radiocarpal joint effusion. IMPRESSION:      ICD-10-CM ICD-9-CM    1. Closed nondisplaced fracture of middle third of scaphoid of left wrist with delayed healing, subsequent encounter S62.025G V54.19         PLAN:  Since she has seen good improvement in her mobility and stiffness, she is going to continue her OT. She will also continue to wear her wrist splint of ran additional 2-3 weeks. She will return in 3 weeks for reevaluation. Scribed by Heather Goff ([address]) as dictated by Don Yoo MD    I, Dr. Don Yoo, confirm that all documentation is accurate.     Don Yoo M.D.   [facility] file     Attends meetings of clubs or organizations: Not on file     Relationship status: Not on file   Other Topics Concern    Not on file   Social History Narrative    Not on file       Review of Systems:  Constitutional:  Denies fever or chills   Eyes:  Denies change in visual acuity   HENT:  Denies nasal congestion or sore throat   Respiratory:  Denies cough or shortness of breath   Cardiovascular:  Denies chest pain or edema   GI:  Denies abdominal pain, nausea, vomiting, bloody stools or diarrhea   :  Denies dysuria   Integument:  Denies rash   Neurologic:  Denies headache, focal weakness or sensory changes   Endocrine:  Denies polyuria or polydipsia   Lymphatic:  Denies swollen glands   Psychiatric:  Denies depression or anxiety     Physical Exam:   Constitutional:  Well developed, well nourished, no acute distress, non-toxic appearance   Integument:  Well hydrated, no rash   Lymphatic:  No lymphadenopathy noted   Neurologic:  Alert & oriented x 3  Psychiatric:  Speech and behavior appropriate   Right upper extremity  Exam performed same as contralateral side and is normal    Left upper extremity    Dorsal incision healed. No erythema or fluctuance. Overall normal alignment. Mild point TTP about the fracture site. Compartments soft. Skin intact. NVI distally.        X-rays were performed today, personally reviewed by me and findings discussed with the patient.  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 They voiced understanding and wish to proceed with surgery July 22. Consents signed in clinic.

## 2019-07-18 NOTE — LETTER
July 22, 2019      Ravi Panchal MD  [address] [facility] [address]  [facility] [address]           [facility]  [address] [facility] [address] [place] [address]  Phone: [phone]          Patient: Alisha Kirk   MR Number: [id number]   YOB: 1985   Date of Visit: 7/18/2019       Dear Dr. Ravi Panchal:    Thank you for referring Alisha Kirk to me for evaluation. Attached you will find relevant portions of my assessment and plan of care.    If you have questions, please do not hesitate to call me. I look forward to following Alisha Kirk along with you.    Sincerely,    Willard Candelario MD    Enclosure  CC:  No Recipients    If you would like to receive this communication electronically, please contact [email or web address] or [phone] to request more information on [employer] access.    For providers and/or their staff who would like to refer a patient to [facility], please contact us through our one-stop-shop provider referral line, Marco Morris, at [phone].    If you feel you have received this communication in error or would no longer like to receive these types of communications, please e-mail [email or web address]

## 2019-07-19 ENCOUNTER — ANESTHESIA EVENT (OUTPATIENT)
Dept: SURGERY | Facility: HOSPITAL | Age: 34
End: 2019-07-19
Payer: COMMERCIAL

## 2019-07-19 DIAGNOSIS — M25.532 LEFT WRIST PAIN: Primary | ICD-10-CM

## 2019-07-22 ENCOUNTER — HOSPITAL ENCOUNTER (OUTPATIENT)
Dept: RADIOLOGY | Facility: HOSPITAL | Age: 34
Discharge: HOME | End: 2019-07-22
Attending: ORTHOPAEDIC SURGERY | Admitting: ORTHOPAEDIC SURGERY
Payer: COMMERCIAL

## 2019-07-22 ENCOUNTER — HOSPITAL ENCOUNTER (OUTPATIENT)
Facility: HOSPITAL | Age: 34
Discharge: HOME | End: 2019-07-22
Attending: ORTHOPAEDIC SURGERY | Admitting: ORTHOPAEDIC SURGERY
Payer: COMMERCIAL

## 2019-07-22 ENCOUNTER — ANESTHESIA (OUTPATIENT)
Dept: SURGERY | Facility: HOSPITAL | Age: 34
End: 2019-07-22
Payer: COMMERCIAL

## 2019-07-22 DIAGNOSIS — S52.322K CLOSED DISPLACED TRANSVERSE FRACTURE OF SHAFT OF LEFT RADIUS WITH NONUNION, SUBSEQUENT ENCOUNTER: ICD-10-CM

## 2019-07-22 DIAGNOSIS — M25.532 LEFT WRIST PAIN: ICD-10-CM

## 2019-07-22 LAB
B-HCG UR QL: NEGATIVE
CTP QC/QA: YES
GLUCOSE SERPL-MCNC: 123 MG/DL (ref 70–110)

## 2019-07-22 PROCEDURE — 25400 PR REPAIR NONUNION RADIUS OR ULNA: ICD-10-PCS | Mod: LT,,, | Performed by: ORTHOPAEDIC SURGERY

## 2019-07-22 PROCEDURE — 81025 URINE PREGNANCY TEST: CPT | Mod: PO | Performed by: ANESTHESIOLOGY

## 2019-07-22 PROCEDURE — 25400 REPAIR RADIUS OR ULNA: CPT | Mod: LT,,, | Performed by: ORTHOPAEDIC SURGERY

## 2019-07-22 PROCEDURE — 64415 NJX AA&/STRD BRCH PLXS IMG: CPT | Mod: PO | Performed by: ANESTHESIOLOGY

## 2019-07-22 PROCEDURE — 27201423 OPTIME MED/SURG SUP & DEVICES STERILE SUPPLY: Mod: PO | Performed by: ORTHOPAEDIC SURGERY

## 2019-07-22 PROCEDURE — 25000003 PHARM REV CODE 250: Mod: PO | Performed by: ANESTHESIOLOGY

## 2019-07-22 PROCEDURE — 37000009 HC ANESTHESIA EA ADD 15 MINS: Mod: PO | Performed by: ORTHOPAEDIC SURGERY

## 2019-07-22 PROCEDURE — 63600175 PHARM REV CODE 636 W HCPCS: Mod: PO | Performed by: ANESTHESIOLOGY

## 2019-07-22 PROCEDURE — 71000033 HC RECOVERY, INTIAL HOUR: Mod: PO | Performed by: ORTHOPAEDIC SURGERY

## 2019-07-22 PROCEDURE — 76942 ECHO GUIDE FOR BIOPSY: CPT | Mod: 26,,, | Performed by: ANESTHESIOLOGY

## 2019-07-22 PROCEDURE — 27800903 OPTIME MED/SURG SUP & DEVICES OTHER IMPLANTS: Mod: PO | Performed by: ORTHOPAEDIC SURGERY

## 2019-07-22 PROCEDURE — C9290 INJ, BUPIVACAINE LIPOSOME: HCPCS | Mod: PO | Performed by: ANESTHESIOLOGY

## 2019-07-22 PROCEDURE — S0020 INJECTION, BUPIVICAINE HYDRO: HCPCS | Mod: PO | Performed by: ANESTHESIOLOGY

## 2019-07-22 PROCEDURE — 25000003 PHARM REV CODE 250: Mod: PO | Performed by: NURSE ANESTHETIST, CERTIFIED REGISTERED

## 2019-07-22 PROCEDURE — 36000710: Mod: PO | Performed by: ORTHOPAEDIC SURGERY

## 2019-07-22 PROCEDURE — 76942 PR U/S GUIDANCE FOR NEEDLE GUIDANCE: ICD-10-PCS | Mod: 26,,, | Performed by: ANESTHESIOLOGY

## 2019-07-22 PROCEDURE — 76942 ECHO GUIDE FOR BIOPSY: CPT | Mod: PO | Performed by: ANESTHESIOLOGY

## 2019-07-22 PROCEDURE — D9220A PRA ANESTHESIA: Mod: ANES,,, | Performed by: ANESTHESIOLOGY

## 2019-07-22 PROCEDURE — 63600175 PHARM REV CODE 636 W HCPCS: Mod: PO | Performed by: ORTHOPAEDIC SURGERY

## 2019-07-22 PROCEDURE — 64415 PR NERVE BLOCK INJ, ANES/STEROID, BRACHIAL PLEXUS, INCL IMAG GUIDANCE: ICD-10-PCS | Mod: 59,LT,, | Performed by: ANESTHESIOLOGY

## 2019-07-22 PROCEDURE — D9220A PRA ANESTHESIA: ICD-10-PCS | Mod: CRNA,,, | Performed by: NURSE ANESTHETIST, CERTIFIED REGISTERED

## 2019-07-22 PROCEDURE — 36000711: Mod: PO | Performed by: ORTHOPAEDIC SURGERY

## 2019-07-22 PROCEDURE — C1713 ANCHOR/SCREW BN/BN,TIS/BN: HCPCS | Mod: PO | Performed by: ORTHOPAEDIC SURGERY

## 2019-07-22 PROCEDURE — 71000015 HC POSTOP RECOV 1ST HR: Mod: PO | Performed by: ORTHOPAEDIC SURGERY

## 2019-07-22 PROCEDURE — D9220A PRA ANESTHESIA: ICD-10-PCS | Mod: ANES,,, | Performed by: ANESTHESIOLOGY

## 2019-07-22 PROCEDURE — 76000 FLUOROSCOPY <1 HR PHYS/QHP: CPT | Mod: TC,PO

## 2019-07-22 PROCEDURE — 37000008 HC ANESTHESIA 1ST 15 MINUTES: Mod: PO | Performed by: ORTHOPAEDIC SURGERY

## 2019-07-22 PROCEDURE — 87081 CULTURE SCREEN ONLY: CPT | Mod: 91

## 2019-07-22 PROCEDURE — 64415 NJX AA&/STRD BRCH PLXS IMG: CPT | Mod: 59,LT,, | Performed by: ANESTHESIOLOGY

## 2019-07-22 PROCEDURE — 63600175 PHARM REV CODE 636 W HCPCS: Mod: PO | Performed by: NURSE ANESTHETIST, CERTIFIED REGISTERED

## 2019-07-22 PROCEDURE — D9220A PRA ANESTHESIA: Mod: CRNA,,, | Performed by: NURSE ANESTHETIST, CERTIFIED REGISTERED

## 2019-07-22 DEVICE — PLATE 7-HOLE 3.5MM LCP
Type: IMPLANTABLE DEVICE | Site: ARM | Status: NON-FUNCTIONAL
Removed: 2021-07-09

## 2019-07-22 DEVICE — SCREW CORTEX 3.5MM X 16MM: Type: IMPLANTABLE DEVICE | Site: ARM | Status: FUNCTIONAL

## 2019-07-22 DEVICE — PUTTY DBX 2.5CC: Type: IMPLANTABLE DEVICE | Site: ARM | Status: FUNCTIONAL

## 2019-07-22 RX ORDER — FENTANYL CITRATE 50 UG/ML
25 INJECTION, SOLUTION INTRAMUSCULAR; INTRAVENOUS EVERY 5 MIN PRN
Status: COMPLETED | OUTPATIENT
Start: 2019-07-22 | End: 2019-07-22

## 2019-07-22 RX ORDER — ONDANSETRON 2 MG/ML
INJECTION INTRAMUSCULAR; INTRAVENOUS
Status: DISCONTINUED | OUTPATIENT
Start: 2019-07-22 | End: 2019-07-22

## 2019-07-22 RX ORDER — HYDROMORPHONE HYDROCHLORIDE 2 MG/ML
0.2 INJECTION, SOLUTION INTRAMUSCULAR; INTRAVENOUS; SUBCUTANEOUS EVERY 5 MIN PRN
Status: DISCONTINUED | OUTPATIENT
Start: 2019-07-22 | End: 2019-07-22 | Stop reason: HOSPADM

## 2019-07-22 RX ORDER — DEXAMETHASONE SODIUM PHOSPHATE 4 MG/ML
8 INJECTION, SOLUTION INTRA-ARTICULAR; INTRALESIONAL; INTRAMUSCULAR; INTRAVENOUS; SOFT TISSUE
Status: COMPLETED | OUTPATIENT
Start: 2019-07-22 | End: 2019-07-22

## 2019-07-22 RX ORDER — LIDOCAINE HYDROCHLORIDE 10 MG/ML
1 INJECTION, SOLUTION EPIDURAL; INFILTRATION; INTRACAUDAL; PERINEURAL ONCE
Status: DISCONTINUED | OUTPATIENT
Start: 2019-07-22 | End: 2019-07-22 | Stop reason: HOSPADM

## 2019-07-22 RX ORDER — LIDOCAINE HCL/PF 100 MG/5ML
SYRINGE (ML) INTRAVENOUS
Status: DISCONTINUED | OUTPATIENT
Start: 2019-07-22 | End: 2019-07-22

## 2019-07-22 RX ORDER — OXYCODONE AND ACETAMINOPHEN 10; 325 MG/1; MG/1
1 TABLET ORAL EVERY 6 HOURS PRN
Qty: 44 TABLET | Refills: 0 | Status: ON HOLD | OUTPATIENT
Start: 2019-07-22 | End: 2019-08-02 | Stop reason: SDUPTHER

## 2019-07-22 RX ORDER — CEFAZOLIN SODIUM 2 G/50ML
2 SOLUTION INTRAVENOUS
Status: COMPLETED | OUTPATIENT
Start: 2019-07-22 | End: 2019-07-22

## 2019-07-22 RX ORDER — MIDAZOLAM HYDROCHLORIDE 1 MG/ML
2 INJECTION INTRAMUSCULAR; INTRAVENOUS
Status: DISCONTINUED | OUTPATIENT
Start: 2019-07-22 | End: 2019-07-22 | Stop reason: HOSPADM

## 2019-07-22 RX ORDER — OXYCODONE HYDROCHLORIDE 5 MG/1
5 TABLET ORAL
Status: DISCONTINUED | OUTPATIENT
Start: 2019-07-22 | End: 2019-07-22 | Stop reason: HOSPADM

## 2019-07-22 RX ORDER — LIDOCAINE HYDROCHLORIDE 10 MG/ML
1 INJECTION, SOLUTION EPIDURAL; INFILTRATION; INTRACAUDAL; PERINEURAL ONCE
Status: COMPLETED | OUTPATIENT
Start: 2019-07-22 | End: 2019-07-22

## 2019-07-22 RX ORDER — FENTANYL CITRATE 50 UG/ML
25 INJECTION, SOLUTION INTRAMUSCULAR; INTRAVENOUS EVERY 5 MIN PRN
Status: DISCONTINUED | OUTPATIENT
Start: 2019-07-22 | End: 2019-07-22 | Stop reason: HOSPADM

## 2019-07-22 RX ORDER — SODIUM CHLORIDE, SODIUM LACTATE, POTASSIUM CHLORIDE, CALCIUM CHLORIDE 600; 310; 30; 20 MG/100ML; MG/100ML; MG/100ML; MG/100ML
INJECTION, SOLUTION INTRAVENOUS CONTINUOUS
Status: DISCONTINUED | OUTPATIENT
Start: 2019-07-22 | End: 2019-07-22 | Stop reason: HOSPADM

## 2019-07-22 RX ORDER — SUCCINYLCHOLINE CHLORIDE 20 MG/ML
INJECTION INTRAMUSCULAR; INTRAVENOUS
Status: DISCONTINUED | OUTPATIENT
Start: 2019-07-22 | End: 2019-07-22

## 2019-07-22 RX ORDER — BUPIVACAINE HYDROCHLORIDE 5 MG/ML
INJECTION, SOLUTION EPIDURAL; INTRACAUDAL
Status: COMPLETED | OUTPATIENT
Start: 2019-07-22 | End: 2019-07-22

## 2019-07-22 RX ORDER — ROCURONIUM BROMIDE 10 MG/ML
INJECTION, SOLUTION INTRAVENOUS
Status: DISCONTINUED | OUTPATIENT
Start: 2019-07-22 | End: 2019-07-22

## 2019-07-22 RX ADMIN — ONDANSETRON 4 MG: 2 INJECTION, SOLUTION INTRAMUSCULAR; INTRAVENOUS at 09:07

## 2019-07-22 RX ADMIN — FENTANYL CITRATE 25 MCG: 50 INJECTION, SOLUTION INTRAMUSCULAR; INTRAVENOUS at 10:07

## 2019-07-22 RX ADMIN — SUCCINYLCHOLINE CHLORIDE 200 MG: 20 INJECTION, SOLUTION INTRAMUSCULAR; INTRAVENOUS at 08:07

## 2019-07-22 RX ADMIN — MIDAZOLAM HYDROCHLORIDE 2 MG: 1 INJECTION, SOLUTION INTRAMUSCULAR; INTRAVENOUS at 07:07

## 2019-07-22 RX ADMIN — SODIUM CHLORIDE, SODIUM LACTATE, POTASSIUM CHLORIDE, AND CALCIUM CHLORIDE: .6; .31; .03; .02 INJECTION, SOLUTION INTRAVENOUS at 07:07

## 2019-07-22 RX ADMIN — OXYCODONE HYDROCHLORIDE 5 MG: 5 TABLET ORAL at 09:07

## 2019-07-22 RX ADMIN — FENTANYL CITRATE 25 MCG: 50 INJECTION, SOLUTION INTRAMUSCULAR; INTRAVENOUS at 09:07

## 2019-07-22 RX ADMIN — LIDOCAINE HYDROCHLORIDE 100 MG: 20 INJECTION PARENTERAL at 08:07

## 2019-07-22 RX ADMIN — BUPIVACAINE HYDROCHLORIDE 10 ML: 5 INJECTION, SOLUTION EPIDURAL; INTRACAUDAL; PERINEURAL at 07:07

## 2019-07-22 RX ADMIN — BUPIVACAINE 10 ML: 13.3 INJECTION, SUSPENSION, LIPOSOMAL INFILTRATION at 07:07

## 2019-07-22 RX ADMIN — ROCURONIUM BROMIDE 5 MG: 10 INJECTION, SOLUTION INTRAVENOUS at 08:07

## 2019-07-22 RX ADMIN — DEXAMETHASONE SODIUM PHOSPHATE 8 MG: 4 INJECTION, SOLUTION INTRAMUSCULAR; INTRAVENOUS at 07:07

## 2019-07-22 RX ADMIN — LIDOCAINE HYDROCHLORIDE 10 MG: 10 INJECTION, SOLUTION EPIDURAL; INFILTRATION; INTRACAUDAL; PERINEURAL at 07:07

## 2019-07-22 RX ADMIN — CEFAZOLIN SODIUM 2 G: 2 SOLUTION INTRAVENOUS at 08:07

## 2019-07-22 NOTE — ANESTHESIA PROCEDURE NOTES
Peripheral Block    Patient location during procedure: pre-op   Block not for primary anesthetic.  Reason for block: at surgeon's request and post-op pain management   Post-op Pain Location: left radius   Start time: 7/22/2019 7:46 AM  Timeout: 7/22/2019 7:46 AM   End time: 7/22/2019 7:55 AM    Staffing  Authorizing Provider: Ritesh Adkins MD  Performing Provider: Ritesh Adkins MD    Preanesthetic Checklist  Completed: patient identified, site marked, surgical consent, pre-op evaluation, timeout performed, IV checked, risks and benefits discussed and monitors and equipment checked  Peripheral Block  Patient position: sitting  Prep: ChloraPrep  Patient monitoring: heart rate, cardiac monitor, continuous pulse ox, continuous capnometry and frequent blood pressure checks  Block type: supraclavicular  Laterality: left  Injection technique: single shot  Needle  Needle type: Stimuplex   Needle gauge: 22 G  Needle length: 2 in  Needle localization: anatomical landmarks and ultrasound guidance   -ultrasound image captured on disc.  Assessment  Injection assessment: negative aspiration, negative parasthesia and local visualized surrounding nerve  Paresthesia pain: none  Heart rate change: no  Slow fractionated injection: yes  Additional Notes  VSS.  [facility] RN monitoring vitals throughout procedure.  Patient tolerated procedure well. Exparel 10 ml, 133mg. No complications

## 2019-07-22 NOTE — ANESTHESIA POSTPROCEDURE EVALUATION
Anesthesia Post Evaluation    Patient: April C Reagan    Procedure(s) Performed: Procedure(s) (LRB):  ORIF, FRACTURE, RADIUS (Left)    Final Anesthesia Type: general  Patient location during evaluation: PACU  Patient participation: Yes- Able to Participate  Level of consciousness: sedated and awake  Post-procedure vital signs: reviewed and stable  Pain management: adequate  Airway patency: patent  PONV status at discharge: No PONV  Anesthetic complications: no      Cardiovascular status: hypertensive and blood pressure returned to baseline  Respiratory status: spontaneous ventilation  Hydration status: euvolemic  Follow-up not needed.          Vitals Value Taken Time   /87 7/22/2019  8:10 AM   Temp 36.7 °C (98 °F) 7/22/2019  7:48 AM   Pulse 59 7/22/2019  8:10 AM   Resp 16 7/22/2019  8:10 AM   SpO2 100 % 7/22/2019  8:10 AM         No case tracking events are documented in the log.      Pain/Haleigh Score: Haleigh Score: 9 (7/22/2019  8:10 AM)

## 2019-07-22 NOTE — TRANSFER OF CARE
"Anesthesia Transfer of Care Note    Patient: April C Reagan    Procedure(s) Performed: Procedure(s) (LRB):  ORIF, FRACTURE, RADIUS (Left)    Anesthesia PACU Handoff    Last vitals:   Visit Vitals  /71 (BP Location: Right arm, Patient Position: Lying)   Pulse 78   Temp 36.4 °C (97.6 °F) (Skin)   Resp 16   Ht 5' 2" (1.575 m)   Wt 104.3 kg (230 lb)   LMP 07/16/2019   SpO2 96%   Breastfeeding? No   BMI 42.07 kg/m²     "

## 2019-07-22 NOTE — INTERVAL H&P NOTE
The patient has been examined and the H&P has been reviewed:    I concur with the findings and no changes have occurred since H&P was written.    Anesthesia/Surgery risks, benefits and alternative options discussed and understood by patient/family.          Active Hospital Problems    Diagnosis  POA    Closed displaced transverse fracture of shaft of left radius [S52.156A]  Yes      Resolved Hospital Problems   No resolved problems to display.

## 2019-07-22 NOTE — OP NOTE
DATE OF PROCEDURE: 7/22/2019     PATIENT: Alisha Kirk     SURGEON: Willard Candelario MD.     ASSISTANT: LALI Norwood     PREOPERATIVE DIAGNOSIS:  Left radius nonunion                                                                    POSTOPERATIVE DIAGNOSIS: same     PROCEDURE:  Open reduction and internal fixation revision radial nonunion                                ANESTHESIA: General endotracheal.     TOURNIQUET TIME: 22 minutes.     IMPLANTS: Synthes small frag set     ESTIMATED BLOOD LOSS: Minimal.     COMPLICATIONS: None.     All counts were correct at the end of the case.     BRIEF HISTORY: This is a 35 yo female who presented to ER complaining of pain and inability to bear weight to the arm. The surgical versus nonsurgical benefits and risks were discussed with patient, and they opted for surgical treatment.     PROCEDURE IN DETAIL: After the proper consents were obtained, the patient wheeled to the OR suite, placed in a supine position with the arm on an arm board. A nonsterile tourniquet was placed on the upper arm. The entire extremity was prepped and draped in a normal sterile fashion. After timeout confirmed the correct extremity, Ancef 2 grams IV was given, and TEDs and SCDS were noted to the bilateral lower extremities, old dorsal incision was used and the old approach to the radial shaft was made. Fracture site was visualized, plate was removed without incidence, and the fracture was cleaned with a curette. All debris was removed and the bones were recannulated. Plate was placed, radial bow was restored, and a screw was placed on either side of the fracture. This was checked on fluoroscopy to be in good position. Distal and proximal cortical screws were then placed. Good compression was noted across the fracture. 2.5 cc DBX was placed around the nonunion site before and after compression. Full pronation and supination was noted. Wound was copiously irrigated with normal saline.  Subcutaneous tissue was closed with 2-0 Vicryl. Skin was closed with a stapler. Xeroform, 4 x 4's, cast padding, and Ace wrap was placed. The patient was extubated in the OR suite and wheeled to recovery in stable condition.        PLAN: They will be nonweightbearing to that extremity. They can begin range of motion in 3-4 days. Staples out in 2 weeks.

## 2019-07-22 NOTE — ANESTHESIA PREPROCEDURE EVALUATION
07/22/2019  Alisha Kirk is a 34 y.o., female.    Anesthesia Evaluation    I have reviewed the Patient Summary Reports.    I have reviewed the Nursing Notes.   I have reviewed the Medications.     Review of Systems  Cardiovascular:   Hypertension  Hypertension    Endocrine:   Diabetes, well controlled  Diabetes    Psych:   anxiety depression          Physical Exam  General:  Morbid Obesity    Airway/Jaw/Neck:  Airway Findings: Mouth Opening: Normal Tongue: Normal  General Airway Assessment: Adult  Mallampati: III  Improves to II with phonation.  Jaw/Neck Findings:  Neck ROM: Normal ROM      Dental:  Dental Findings: In tact, Periodontal disease, Mild   Chest/Lungs:  Chest/Lungs Findings: Clear to auscultation, Normal Respiratory Rate         Mental Status:  Mental Status Findings:  Cooperative, Alert and Oriented         Anesthesia Plan  Type of Anesthesia, risks & benefits discussed:  Anesthesia Type:  general, regional  Patient's Preference:   Intra-op Monitoring Plan: standard ASA monitors  Intra-op Monitoring Plan Comments:   Post Op Pain Control Plan: multimodal analgesia and peripheral nerve block  Post Op Pain Control Plan Comments:   Induction:   IV and Inhalation  Beta Blocker:  Patient is on a Beta-Blocker and has received one dose within the past 24 hours (No further documentation required).       Informed Consent: Patient understands risks and agrees with Anesthesia plan.  Questions answered. Anesthesia consent signed with patient.  ASA Score: 2     Day of Surgery Review of History & Physical:            Ready For Surgery From Anesthesia Perspective.

## 2019-07-22 NOTE — DISCHARGE INSTRUCTIONS
AFTER HAND SURGERY    DOS:   Keep affected arm elevated above the level of your heart   Check circulation frequently in fingers by pressing on the nail bed. Nail bed should turn white and then pink when released.   Exercise fingers to promote circulation.   Advance diet as tolerated   Resume home medications ________________________    DONT:   No driving for 24 hours or while taking narcotic pain medication   DO NOT TAKE ADDITIONAL TYLENOL/ACETAMINOPHEN WHILE TAKING NARCOTIC PAIN MEDICATION THAT CONTAINS TYLENOL/ACETAMINOPHEN.    CALL PHYSICIAN FOR:   Obvious bleeding   Excessive swelling   Drainage (pus) from the wound   Pain unrelieved by pain medication.  Make return appointment for ________________________________  FOR EMERGENCIES:  Contact  _____________________ at [phone]

## 2019-07-22 NOTE — DISCHARGE SUMMARY
[facility]  Discharge Note  Short Stay    Admit Date: 7/22/2019    Discharge Date and Time:7/22/2019      Attending Physician: Willard Candelario MD     Discharge Provider: Willard Candelario    Diagnoses:  Active Hospital Problems    Diagnosis  POA    Closed displaced transverse fracture of shaft of left radius with nonunion [S52.322K]  Yes      Resolved Hospital Problems   No resolved problems to display.       Discharged Condition: good    Hospital Course: Patient was admitted for an outpatient procedure and tolerated the procedure well with no complications.    Final Diagnoses: Same as principal problem.    Disposition: Home or Self Care    Follow up/Patient Instructions:    Medications:  Reconciled Home Medications:      Medication List      START taking these medications    oxyCODONE-acetaminophen  mg per tablet  Commonly known as:  PERCOCET  Take 1 tablet by mouth every 6 (six) hours as needed for Pain.        CONTINUE taking these medications    atorvastatin 20 MG tablet  Commonly known as:  LIPITOR  TAKE ONE TABLET BY MOUTH AT BEDTIME; Instr DOSE increased TO 20mg, discontinue 10mg     busPIRone 15 MG tablet  Commonly known as:  BUSPAR  Take 15 mg by mouth 3 (three) times daily.     celecoxib 200 MG capsule  Commonly known as:  CeleBREX  Take 200 mg by mouth once daily.     ergocalciferol 50,000 unit Cap  Commonly known as:  ERGOCALCIFEROL  Take 50,000 Units by mouth every 7 days.     escitalopram oxalate 20 MG tablet  Commonly known as:  LEXAPRO  Take 20 mg by mouth once daily.     fluticasone propionate 50 mcg/actuation nasal spray  Commonly known as:  FLONASE  ADMINISTER ONE SPRAY IN EACH NOSTRIL TWICE DAILY     JARDIANCE 10 mg Tab  Generic drug:  empagliflozin  TAKE ONE TABLET BY MOUTH EVERY DAY IN THE MORNING STOP TRADJENTA     levothyroxine 50 MCG tablet  Commonly known as:  SYNTHROID  Take 50 mcg by mouth once daily.     medroxyPROGESTERone 10 MG tablet  Commonly known as:  PROVERA  TAKE  ONE TABLET BY MOUTH EVERY DAY FOR 10 DAYS ON DAYS 15-24 of menses.     metoprolol tartrate 50 MG tablet  Commonly known as:  LOPRESSOR  Take 50 mg by mouth 2 (two) times daily.     mirtazapine 15 MG tablet  Commonly known as:  REMERON  Take 15 mg by mouth nightly.     montelukast 10 mg tablet  Commonly known as:  SINGULAIR  Take 10 mg by mouth every evening.     nabumetone 750 MG tablet  Commonly known as:  RELAFEN  Take 750 mg by mouth 2 (two) times daily.     OXcarbazepine 600 MG Tab  Commonly known as:  TRILEPTAL  Take 150 mg by mouth 2 (two) times daily.     risperiDONE 2 MG tablet  Commonly known as:  RISPERDAL  Take 2 mg by mouth once daily.     silver sulfADIAZINE 1% 1 % cream  Commonly known as:  SILVADENE  APPLY TO THE AFFECTED AREA(S) TWICE DAILY     spironolactone 25 MG tablet  Commonly known as:  ALDACTONE  Take 25 mg by mouth 2 (two) times daily.     TRUEPLUS LANCETS 30 gauge Misc  Generic drug:  lancets  USE TO CHECK BLOOD SUGAR FIVE TIMES DAILY        STOP taking these medications    ibuprofen 800 MG tablet  Commonly known as:  ADVIL,MOTRIN          Discharge Procedure Orders   Diet diabetic     Lifting restrictions     Notify your health care provider if you experience any of the following:  redness, tenderness, or signs of infection (pain, swelling, redness, odor or green/yellow discharge around incision site)     Change dressing (specify)   Order Comments: Dressing change: 1 times per day using gauze and ace beginning 7/24.         Discharge Procedure Orders (must include Diet, Follow-up, Activity):   Discharge Procedure Orders (must include Diet, Follow-up, Activity)   Diet diabetic     Lifting restrictions     Notify your health care provider if you experience any of the following:  redness, tenderness, or signs of infection (pain, swelling, redness, odor or green/yellow discharge around incision site)     Change dressing (specify)   Order Comments: Dressing change: 1 times per day using gauze  and ace beginning 7/24.

## 2019-07-23 VITALS
HEART RATE: 78 BPM | BODY MASS INDEX: 42.33 KG/M2 | HEIGHT: 62 IN | SYSTOLIC BLOOD PRESSURE: 132 MMHG | OXYGEN SATURATION: 97 % | RESPIRATION RATE: 16 BRPM | WEIGHT: 230 LBS | DIASTOLIC BLOOD PRESSURE: 82 MMHG | TEMPERATURE: 98 F

## 2019-07-24 LAB
MRSA SPEC QL CULT: NORMAL
MRSA SPEC QL CULT: NORMAL

## 2019-08-01 ENCOUNTER — OFFICE VISIT (OUTPATIENT)
Dept: ORTHOPEDICS | Facility: CLINIC | Age: 34
End: 2019-08-01
Payer: COMMERCIAL

## 2019-08-01 ENCOUNTER — HOSPITAL ENCOUNTER (OUTPATIENT)
Dept: RADIOLOGY | Facility: HOSPITAL | Age: 34
Discharge: HOME | End: 2019-08-01
Attending: ORTHOPAEDIC SURGERY
Payer: COMMERCIAL

## 2019-08-01 ENCOUNTER — PATIENT MESSAGE (OUTPATIENT)
Dept: ORTHOPEDICS | Facility: CLINIC | Age: 34
End: 2019-08-01

## 2019-08-01 ENCOUNTER — ANESTHESIA EVENT (OUTPATIENT)
Dept: SURGERY | Facility: HOSPITAL | Age: 34
End: 2019-08-01
Payer: COMMERCIAL

## 2019-08-01 VITALS
WEIGHT: 230 LBS | HEIGHT: 62 IN | HEART RATE: 68 BPM | BODY MASS INDEX: 42.33 KG/M2 | SYSTOLIC BLOOD PRESSURE: 128 MMHG | DIASTOLIC BLOOD PRESSURE: 83 MMHG

## 2019-08-01 DIAGNOSIS — S52.322K CLOSED DISPLACED TRANSVERSE FRACTURE OF SHAFT OF LEFT RADIUS WITH NONUNION, SUBSEQUENT ENCOUNTER: Primary | ICD-10-CM

## 2019-08-01 DIAGNOSIS — S52.322A CLOSED DISPLACED TRANSVERSE FRACTURE OF SHAFT OF LEFT RADIUS, INITIAL ENCOUNTER: ICD-10-CM

## 2019-08-01 DIAGNOSIS — S52.322K CLOSED DISPLACED TRANSVERSE FRACTURE OF SHAFT OF LEFT RADIUS WITH NONUNION, SUBSEQUENT ENCOUNTER: ICD-10-CM

## 2019-08-01 PROCEDURE — 99214 PR OFFICE/OUTPT VISIT, EST, LEVL IV, 30-39 MIN: ICD-10-PCS | Mod: 24,57,S$GLB, | Performed by: ORTHOPAEDIC SURGERY

## 2019-08-01 PROCEDURE — 73090 X-RAY EXAM OF FOREARM: CPT | Mod: 26,LT,, | Performed by: RADIOLOGY

## 2019-08-01 PROCEDURE — 99214 OFFICE O/P EST MOD 30 MIN: CPT | Mod: 24,57,S$GLB, | Performed by: ORTHOPAEDIC SURGERY

## 2019-08-01 PROCEDURE — 73090 X-RAY EXAM OF FOREARM: CPT | Mod: TC,PO,LT

## 2019-08-01 PROCEDURE — 3008F PR BODY MASS INDEX (BMI) DOCUMENTED: ICD-10-PCS | Mod: CPTII,S$GLB,, | Performed by: ORTHOPAEDIC SURGERY

## 2019-08-01 PROCEDURE — 99999 PR PBB SHADOW E&M-EST. PATIENT-LVL IV: ICD-10-PCS | Mod: PBBFAC,,, | Performed by: ORTHOPAEDIC SURGERY

## 2019-08-01 PROCEDURE — 3008F BODY MASS INDEX DOCD: CPT | Mod: CPTII,S$GLB,, | Performed by: ORTHOPAEDIC SURGERY

## 2019-08-01 PROCEDURE — 73090 XR FOREARM LEFT: ICD-10-PCS | Mod: 26,LT,, | Performed by: RADIOLOGY

## 2019-08-01 PROCEDURE — 99999 PR PBB SHADOW E&M-EST. PATIENT-LVL IV: CPT | Mod: PBBFAC,,, | Performed by: ORTHOPAEDIC SURGERY

## 2019-08-01 RX ORDER — SODIUM CHLORIDE 0.9 % (FLUSH) 0.9 %
3 SYRINGE (ML) INJECTION EVERY 6 HOURS PRN
Status: SHIPPED | OUTPATIENT
Start: 2019-08-01

## 2019-08-01 RX ORDER — IBUPROFEN 800 MG/1
TABLET ORAL
Status: ON HOLD | COMMUNITY
Start: 2019-07-27 | End: 2019-08-02 | Stop reason: HOSPADM

## 2019-08-01 RX ORDER — LIDOCAINE HYDROCHLORIDE 10 MG/ML
1 INJECTION, SOLUTION EPIDURAL; INFILTRATION; INTRACAUDAL; PERINEURAL ONCE
Status: CANCELLED | OUTPATIENT
Start: 2019-08-01 | End: 2019-08-01

## 2019-08-01 NOTE — TELEPHONE ENCOUNTER
Called Kennard back. Spoke to ED physician. He states that pt was pushing herself up off of the floor last night, she forgot about the arm. He states that he sees a new fracture near the proximal screw. He states that he sees an angulation of a few degrees in the wrist area. He will place pt in a long arm splint, and send her over here with a disc of their films. We will see pt at 1 pm. Thanks, Lennie

## 2019-08-01 NOTE — H&P
"  Chief Complaint   Patient presents with    Left Forearm - Post-op Evaluation, Pain, Injury       HPI:   This is a 34 y.o. who returns today status post left radial shaft revision 10 days ago. Patient states she pushed off the floor earlier today and "felt a pop".  She presented to [place] and was found to have a radial shaft fracture.   Pain is aching. No numbness or tingling. No associated signs or symptoms.    Past Medical History:   Diagnosis Date    Anxiety     Depression     Diabetes mellitus     Environmental allergies     Hyperlipidemia     Hypertension     Thyroid disease      Past Surgical History:   Procedure Laterality Date    ABDOMINAL SURGERY          ORIF, FRACTURE, RADIAL SHAFT Left 2019    [facility]    ORIF, FRACTURE, RADIUS Left 2019    Performed by [facility] at [facility] OR     Current Outpatient Medications on File Prior to Visit   Medication Sig Dispense Refill    atorvastatin (LIPITOR) 20 MG tablet TAKE ONE TABLET BY MOUTH AT BEDTIME; Instr DOSE increased TO 20mg, discontinue 10mg  3    busPIRone (BUSPAR) 15 MG tablet Take 15 mg by mouth 3 (three) times daily.  0    celecoxib (CELEBREX) 200 MG capsule Take 200 mg by mouth once daily.  5    ergocalciferol (ERGOCALCIFEROL) 50,000 unit Cap Take 50,000 Units by mouth every 7 days.  0    escitalopram oxalate (LEXAPRO) 20 MG tablet Take 20 mg by mouth once daily.      fluticasone propionate (FLONASE) 50 mcg/actuation nasal spray ADMINISTER ONE SPRAY IN EACH NOSTRIL TWICE DAILY  1    ibuprofen (ADVIL,MOTRIN) 800 MG tablet       JARDIANCE 10 mg Tab TAKE ONE TABLET BY MOUTH EVERY DAY IN THE MORNING STOP TRADJENTA  1    levothyroxine (SYNTHROID) 50 MCG tablet Take 50 mcg by mouth once daily.  2    medroxyPROGESTERone (PROVERA) 10 MG tablet TAKE ONE TABLET BY MOUTH EVERY DAY FOR 10 DAYS ON DAYS 15-24 of menses.  2    metoprolol tartrate (LOPRESSOR) 50 MG tablet Take 50 mg " by mouth 2 (two) times daily.      mirtazapine (REMERON) 15 MG tablet Take 15 mg by mouth nightly.  2    montelukast (SINGULAIR) 10 mg tablet Take 10 mg by mouth every evening.  2    nabumetone (RELAFEN) 750 MG tablet Take 750 mg by mouth 2 (two) times daily.  0    OXcarbazepine (TRILEPTAL) 600 MG Tab Take 150 mg by mouth 2 (two) times daily.      oxyCODONE-acetaminophen (PERCOCET)  mg per tablet Take 1 tablet by mouth every 6 (six) hours as needed for Pain. 44 tablet 0    risperiDONE (RISPERDAL) 2 MG tablet Take 2 mg by mouth once daily.  0    silver sulfADIAZINE 1% (SILVADENE) 1 % cream APPLY TO THE AFFECTED AREA(S) TWICE DAILY  3    spironolactone (ALDACTONE) 25 MG tablet Take 25 mg by mouth 2 (two) times daily.      TRUEPLUS LANCETS 30 gauge Misc USE TO CHECK BLOOD SUGAR FIVE TIMES DAILY  5     Current Facility-Administered Medications on File Prior to Visit   Medication Dose Route Frequency Provider Last Rate Last Dose    [DISCONTINUED] sodium chloride 0.9% flush 3 mL  3 mL Intravenous Q6H PRN Willard Candelario MD         Review of patient's allergies indicates:   Allergen Reactions    Keppra [levetiracetam]      Makes aggressive    Tramadol      Seizures    Xanax [alprazolam] Other (See Comments)     Makes aggressive     No family history on file.  Social History     Socioeconomic History    Marital status:      Spouse name: Not on file    Number of children: Not on file    Years of education: Not on file    Highest education level: Not on file   Occupational History    Not on file   Social Needs    Financial resource strain: Not on file    Food insecurity:     Worry: Not on file     Inability: Not on file    Transportation needs:     Medical: Not on file     Non-medical: Not on file   Tobacco Use    Smoking status: Former Smoker     Packs/day: 0.50     Years: 3.00     Pack years: 1.50     Types: Cigarettes     Last attempt to quit:      Years since quittin.5     Smokeless tobacco: Never Used   Substance and Sexual Activity    Alcohol use: Yes     Comment: One glass of wine once a month    Drug use: No    Sexual activity: Yes     Partners: Male     Birth control/protection: None   Lifestyle    Physical activity:     Days per week: Not on file     Minutes per session: Not on file    Stress: Not on file   Relationships    Social connections:     Talks on phone: Not on file     Gets together: Not on file     Attends Yarsani service: Not on file     Active member of club or organization: Not on file     Attends meetings of clubs or organizations: Not on file     Relationship status: Not on file   Other Topics Concern    Not on file   Social History Narrative    Not on file       Review of Systems:  Constitutional:  Denies fever or chills   Eyes:  Denies change in visual acuity   HENT:  Denies nasal congestion or sore throat   Respiratory:  Denies cough or shortness of breath   Cardiovascular:  Denies chest pain or edema   GI:  Denies abdominal pain, nausea, vomiting, bloody stools or diarrhea   :  Denies dysuria   Integument:  Denies rash   Neurologic:  Denies headache, focal weakness or sensory changes   Endocrine:  Denies polyuria or polydipsia   Lymphatic:  Denies swollen glands   Psychiatric:  Denies depression or anxiety     Physical Exam:   Constitutional:  Well developed, well nourished, no acute distress, non-toxic appearance   Integument:  Well hydrated, no rash   Lymphatic:  No lymphadenopathy noted   Neurologic:  Alert & oriented x 3   Psychiatric:  Speech and behavior appropriate   Right upper extremity  Exam performed same as contralateral side and is normal    Left upper extremity  Well fitting long arm splint in place. NVI distally. Point TTP about the proximal radius. Skin intact. Compartments soft. Hand perfused.       X-rays were performed today, personally reviewed by me and findings discussed with the patient.  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 They voiced understanding and wish to proceed with surgery tomorrow. She is NPO after midnight. Consents signed in clinic.

## 2019-08-01 NOTE — H&P (VIEW-ONLY)
"  Chief Complaint   Patient presents with    Left Forearm - Post-op Evaluation, Pain, Injury       HPI:   This is a 34 y.o. who returns today status post left radial shaft revision 10 days ago. Patient states she pushed off the floor earlier today and "felt a pop".  She presented to [facility] and was found to have a radial shaft fracture.   Pain is aching. No numbness or tingling. No associated signs or symptoms.    Past Medical History:   Diagnosis Date    Anxiety     Depression     Diabetes mellitus     Environmental allergies     Hyperlipidemia     Hypertension     Thyroid disease      Past Surgical History:   Procedure Laterality Date    ABDOMINAL SURGERY          ORIF, FRACTURE, RADIAL SHAFT Left 2019    Per[facility] by [facility] at [facility] OR    ORIF, FRACTURE, RADIUS Left 2019    Performed by Willard Candelario [facility] at [facility] OR     Current Outpatient Medications on File Prior to Visit   Medication Sig Dispense Refill    atorvastatin (LIPITOR) 20 MG tablet TAKE ONE TABLET BY MOUTH AT BEDTIME; Instr DOSE increased TO 20mg, discontinue 10mg  3    busPIRone (BUSPAR) 15 MG tablet Take 15 mg by mouth 3 (three) times daily.  0    celecoxib (CELEBREX) 200 MG capsule Take 200 mg by mouth once daily.  5    ergocalciferol (ERGOCALCIFEROL) 50,000 unit Cap Take 50,000 Units by mouth every 7 days.  0    escitalopram oxalate (LEXAPRO) 20 MG tablet Take 20 mg by mouth once daily.      fluticasone propionate (FLONASE) 50 mcg/actuation nasal spray ADMINISTER ONE SPRAY IN EACH NOSTRIL TWICE DAILY  1    ibuprofen (ADVIL,MOTRIN) 800 MG tablet       JARDIANCE 10 mg Tab TAKE ONE TABLET BY MOUTH EVERY DAY IN THE MORNING STOP TRADJENTA  1    levothyroxine (SYNTHROID) 50 MCG tablet Take 50 mcg by mouth once daily.  2    medroxyPROGESTERone (PROVERA) 10 MG tablet TAKE ONE TABLET BY MOUTH EVERY DAY FOR 10 DAYS ON DAYS 15-24 of menses.  2    metoprolol tartrate (LOPRESSOR) 50 MG tablet Take 50 mg " by mouth 2 (two) times daily.      mirtazapine (REMERON) 15 MG tablet Take 15 mg by mouth nightly.  2    montelukast (SINGULAIR) 10 mg tablet Take 10 mg by mouth every evening.  2    nabumetone (RELAFEN) 750 MG tablet Take 750 mg by mouth 2 (two) times daily.  0    OXcarbazepine (TRILEPTAL) 600 MG Tab Take 150 mg by mouth 2 (two) times daily.      oxyCODONE-acetaminophen (PERCOCET)  mg per tablet Take 1 tablet by mouth every 6 (six) hours as needed for Pain. 44 tablet 0    risperiDONE (RISPERDAL) 2 MG tablet Take 2 mg by mouth once daily.  0    silver sulfADIAZINE 1% (SILVADENE) 1 % cream APPLY TO THE AFFECTED AREA(S) TWICE DAILY  3    spironolactone (ALDACTONE) 25 MG tablet Take 25 mg by mouth 2 (two) times daily.      TRUEPLUS LANCETS 30 gauge Misc USE TO CHECK BLOOD SUGAR FIVE TIMES DAILY  5     Current Facility-Administered Medications on File Prior to Visit   Medication Dose Route Frequency Provider Last Rate Last Dose    [DISCONTINUED] sodium chloride 0.9% flush 3 mL  3 mL Intravenous Q6H PRN Willard Candelario MD         Review of patient's allergies indicates:   Allergen Reactions    Keppra [levetiracetam]      Makes aggressive    Tramadol      Seizures    Xanax [alprazolam] Other (See Comments)     Makes aggressive     No family history on file.  Social History     Socioeconomic History    Marital status:      Spouse name: Not on file    Number of children: Not on file    Years of education: Not on file    Highest education level: Not on file   Occupational History    Not on file   Social Needs    Financial resource strain: Not on file    Food insecurity:     Worry: Not on file     Inability: Not on file    Transportation needs:     Medical: Not on file     Non-medical: Not on file   Tobacco Use    Smoking status: Former Smoker     Packs/day: 0.50     Years: 3.00     Pack years: 1.50     Types: Cigarettes     Last attempt to quit:      Years since quittin.5     Smokeless tobacco: Never Used   Substance and Sexual Activity    Alcohol use: Yes     Comment: One glass of wine once a month    Drug use: No    Sexual activity: Yes     Partners: Male     Birth control/protection: None   Lifestyle    Physical activity:     Days per week: Not on file     Minutes per session: Not on file    Stress: Not on file   Relationships    Social connections:     Talks on phone: Not on file     Gets together: Not on file     Attends Yarsanism service: Not on file     Active member of club or organization: Not on file     Attends meetings of clubs or organizations: Not on file     Relationship status: Not on file   Other Topics Concern    Not on file   Social History Narrative    Not on file       Review of Systems:  Constitutional:  Denies fever or chills   Eyes:  Denies change in visual acuity   HENT:  Denies nasal congestion or sore throat   Respiratory:  Denies cough or shortness of breath   Cardiovascular:  Denies chest pain or edema   GI:  Denies abdominal pain, nausea, vomiting, bloody stools or diarrhea   :  Denies dysuria   Integument:  Denies rash   Neurologic:  Denies headache, focal weakness or sensory changes   Endocrine:  Denies polyuria or polydipsia   Lymphatic:  Denies swollen glands   Psychiatric:  Denies depression or anxiety     Physical Exam:   Constitutional:  Well developed, well nourished, no acute distress, non-toxic appearance   Integument:  Well hydrated, no rash   Lymphatic:  No lymphadenopathy noted   Neurologic:  Alert & oriented x 3   Psychiatric:  Speech and behavior appropriate   Right upper extremity  Exam performed same as contralateral side and is normal    Left upper extremity  Well fitting long arm splint in place. NVI distally. Point TTP about the proximal radius. Skin intact. Compartments soft. Hand perfused.       X-rays were performed today, personally reviewed by me and findings discussed with the patient.  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 They voiced understanding and wish to proceed with surgery tomorrow. She is NPO after midnight. Consents signed in clinic.

## 2019-08-01 NOTE — TELEPHONE ENCOUNTER
----- Message from Spencer Vela sent at 8/1/2019 10:51 AM CDT -----  Contact: Yumiko with [facility] called to speak with the provider about the pt. Please call to advise.    Call Back #: [phone]  Thanks

## 2019-08-02 ENCOUNTER — HOSPITAL ENCOUNTER (OUTPATIENT)
Dept: RADIOLOGY | Facility: HOSPITAL | Age: 34
Discharge: HOME | End: 2019-08-02
Attending: ORTHOPAEDIC SURGERY | Admitting: ORTHOPAEDIC SURGERY
Payer: COMMERCIAL

## 2019-08-02 ENCOUNTER — ANESTHESIA (OUTPATIENT)
Dept: SURGERY | Facility: HOSPITAL | Age: 34
End: 2019-08-02
Payer: COMMERCIAL

## 2019-08-02 ENCOUNTER — HOSPITAL ENCOUNTER (OUTPATIENT)
Facility: HOSPITAL | Age: 34
Discharge: HOME | End: 2019-08-02
Attending: ORTHOPAEDIC SURGERY | Admitting: ORTHOPAEDIC SURGERY
Payer: COMMERCIAL

## 2019-08-02 VITALS
BODY MASS INDEX: 42.31 KG/M2 | SYSTOLIC BLOOD PRESSURE: 116 MMHG | WEIGHT: 229.94 LBS | DIASTOLIC BLOOD PRESSURE: 73 MMHG | OXYGEN SATURATION: 95 % | RESPIRATION RATE: 12 BRPM | HEIGHT: 62 IN | TEMPERATURE: 97 F | HEART RATE: 82 BPM

## 2019-08-02 DIAGNOSIS — S52.322K CLOSED DISPLACED TRANSVERSE FRACTURE OF SHAFT OF LEFT RADIUS WITH NONUNION, SUBSEQUENT ENCOUNTER: ICD-10-CM

## 2019-08-02 DIAGNOSIS — M79.632 LEFT FOREARM PAIN: ICD-10-CM

## 2019-08-02 DIAGNOSIS — S52.322A CLOSED DISPLACED TRANSVERSE FRACTURE OF SHAFT OF LEFT RADIUS, INITIAL ENCOUNTER: ICD-10-CM

## 2019-08-02 LAB
B-HCG UR QL: NEGATIVE
CTP QC/QA: YES
GLUCOSE SERPL-MCNC: 100 MG/DL (ref 70–110)

## 2019-08-02 PROCEDURE — D9220A PRA ANESTHESIA: Mod: CRNA,,, | Performed by: NURSE ANESTHETIST, CERTIFIED REGISTERED

## 2019-08-02 PROCEDURE — 63600175 PHARM REV CODE 636 W HCPCS: Mod: PO | Performed by: ANESTHESIOLOGY

## 2019-08-02 PROCEDURE — 64415 NJX AA&/STRD BRCH PLXS IMG: CPT | Mod: 59,LT,, | Performed by: ANESTHESIOLOGY

## 2019-08-02 PROCEDURE — 76942 ECHO GUIDE FOR BIOPSY: CPT | Mod: PO | Performed by: ANESTHESIOLOGY

## 2019-08-02 PROCEDURE — 36000710: Mod: PO | Performed by: ORTHOPAEDIC SURGERY

## 2019-08-02 PROCEDURE — 63600175 PHARM REV CODE 636 W HCPCS: Mod: PO | Performed by: NURSE ANESTHETIST, CERTIFIED REGISTERED

## 2019-08-02 PROCEDURE — 37000008 HC ANESTHESIA 1ST 15 MINUTES: Mod: PO | Performed by: ORTHOPAEDIC SURGERY

## 2019-08-02 PROCEDURE — 25000003 PHARM REV CODE 250: Mod: PO | Performed by: ANESTHESIOLOGY

## 2019-08-02 PROCEDURE — 63600175 PHARM REV CODE 636 W HCPCS: Mod: PO | Performed by: ORTHOPAEDIC SURGERY

## 2019-08-02 PROCEDURE — 64415 PERIPHERAL BLOCK: ICD-10-PCS | Mod: 59,LT,, | Performed by: ANESTHESIOLOGY

## 2019-08-02 PROCEDURE — D9220A PRA ANESTHESIA: ICD-10-PCS | Mod: CRNA,,, | Performed by: NURSE ANESTHETIST, CERTIFIED REGISTERED

## 2019-08-02 PROCEDURE — C1713 ANCHOR/SCREW BN/BN,TIS/BN: HCPCS | Mod: PO | Performed by: ORTHOPAEDIC SURGERY

## 2019-08-02 PROCEDURE — 25400 REPAIR RADIUS OR ULNA: CPT | Mod: 78,LT,, | Performed by: ORTHOPAEDIC SURGERY

## 2019-08-02 PROCEDURE — 27201423 OPTIME MED/SURG SUP & DEVICES STERILE SUPPLY: Mod: PO | Performed by: ORTHOPAEDIC SURGERY

## 2019-08-02 PROCEDURE — 27200750 HC INSULATED NEEDLE/ STIMUPLEX: Mod: PO | Performed by: ANESTHESIOLOGY

## 2019-08-02 PROCEDURE — 25000003 PHARM REV CODE 250: Mod: PO | Performed by: NURSE ANESTHETIST, CERTIFIED REGISTERED

## 2019-08-02 PROCEDURE — 71000015 HC POSTOP RECOV 1ST HR: Mod: PO | Performed by: ORTHOPAEDIC SURGERY

## 2019-08-02 PROCEDURE — 76942 PERIPHERAL BLOCK: ICD-10-PCS | Mod: 26,,, | Performed by: ANESTHESIOLOGY

## 2019-08-02 PROCEDURE — 37000009 HC ANESTHESIA EA ADD 15 MINS: Mod: PO | Performed by: ORTHOPAEDIC SURGERY

## 2019-08-02 PROCEDURE — 36000711: Mod: PO | Performed by: ORTHOPAEDIC SURGERY

## 2019-08-02 PROCEDURE — C9290 INJ, BUPIVACAINE LIPOSOME: HCPCS | Mod: PO | Performed by: ANESTHESIOLOGY

## 2019-08-02 PROCEDURE — 25400 PR REPAIR NONUNION RADIUS OR ULNA: ICD-10-PCS | Mod: 78,LT,, | Performed by: ORTHOPAEDIC SURGERY

## 2019-08-02 PROCEDURE — 76000 FLUOROSCOPY <1 HR PHYS/QHP: CPT | Mod: TC,PO

## 2019-08-02 PROCEDURE — 81025 URINE PREGNANCY TEST: CPT | Mod: PO | Performed by: ORTHOPAEDIC SURGERY

## 2019-08-02 PROCEDURE — 25000003 PHARM REV CODE 250: Mod: PO | Performed by: ORTHOPAEDIC SURGERY

## 2019-08-02 PROCEDURE — D9220A PRA ANESTHESIA: ICD-10-PCS | Mod: ANES,,, | Performed by: ANESTHESIOLOGY

## 2019-08-02 PROCEDURE — S0020 INJECTION, BUPIVICAINE HYDRO: HCPCS | Mod: PO | Performed by: ANESTHESIOLOGY

## 2019-08-02 PROCEDURE — D9220A PRA ANESTHESIA: Mod: ANES,,, | Performed by: ANESTHESIOLOGY

## 2019-08-02 PROCEDURE — 71000033 HC RECOVERY, INTIAL HOUR: Mod: PO | Performed by: ORTHOPAEDIC SURGERY

## 2019-08-02 DEVICE — IMPLANTABLE DEVICE: Type: IMPLANTABLE DEVICE | Site: ARM | Status: FUNCTIONAL

## 2019-08-02 DEVICE — SCREW BONE NON LOCK 3.5X16MM
Type: IMPLANTABLE DEVICE | Site: ARM | Status: NON-FUNCTIONAL
Removed: 2021-07-09

## 2019-08-02 DEVICE — SCREW BONE NON LOCK 3.5X18MM
Type: IMPLANTABLE DEVICE | Site: ARM | Status: NON-FUNCTIONAL
Removed: 2021-07-09

## 2019-08-02 RX ORDER — FENTANYL CITRATE 50 UG/ML
INJECTION, SOLUTION INTRAMUSCULAR; INTRAVENOUS
Status: DISCONTINUED | OUTPATIENT
Start: 2019-08-02 | End: 2019-08-02

## 2019-08-02 RX ORDER — GLYCOPYRROLATE 0.2 MG/ML
INJECTION INTRAMUSCULAR; INTRAVENOUS
Status: DISCONTINUED | OUTPATIENT
Start: 2019-08-02 | End: 2019-08-02

## 2019-08-02 RX ORDER — ACETAMINOPHEN 10 MG/ML
INJECTION, SOLUTION INTRAVENOUS
Status: DISCONTINUED | OUTPATIENT
Start: 2019-08-02 | End: 2019-08-02

## 2019-08-02 RX ORDER — CEFAZOLIN SODIUM 2 G/50ML
2 SOLUTION INTRAVENOUS
Status: COMPLETED | OUTPATIENT
Start: 2019-08-02 | End: 2019-08-02

## 2019-08-02 RX ORDER — OXYCODONE AND ACETAMINOPHEN 10; 325 MG/1; MG/1
1 TABLET ORAL EVERY 4 HOURS PRN
Qty: 44 TABLET | Refills: 0 | Status: SHIPPED | OUTPATIENT
Start: 2019-08-02 | End: 2019-08-13 | Stop reason: SDUPTHER

## 2019-08-02 RX ORDER — PROPOFOL 10 MG/ML
VIAL (ML) INTRAVENOUS
Status: DISCONTINUED | OUTPATIENT
Start: 2019-08-02 | End: 2019-08-02

## 2019-08-02 RX ORDER — LIDOCAINE HCL/PF 100 MG/5ML
SYRINGE (ML) INTRAVENOUS
Status: DISCONTINUED | OUTPATIENT
Start: 2019-08-02 | End: 2019-08-02

## 2019-08-02 RX ORDER — HYDROMORPHONE HYDROCHLORIDE 2 MG/ML
0.2 INJECTION, SOLUTION INTRAMUSCULAR; INTRAVENOUS; SUBCUTANEOUS EVERY 5 MIN PRN
Status: CANCELLED | OUTPATIENT
Start: 2019-08-02

## 2019-08-02 RX ORDER — FENTANYL CITRATE 50 UG/ML
25 INJECTION, SOLUTION INTRAMUSCULAR; INTRAVENOUS EVERY 5 MIN PRN
Status: DISCONTINUED | OUTPATIENT
Start: 2019-08-02 | End: 2019-08-02 | Stop reason: HOSPADM

## 2019-08-02 RX ORDER — LIDOCAINE HYDROCHLORIDE 10 MG/ML
1 INJECTION, SOLUTION EPIDURAL; INFILTRATION; INTRACAUDAL; PERINEURAL ONCE
Status: COMPLETED | OUTPATIENT
Start: 2019-08-02 | End: 2019-08-02

## 2019-08-02 RX ORDER — NEOSTIGMINE METHYLSULFATE 1 MG/ML
INJECTION, SOLUTION INTRAVENOUS
Status: DISCONTINUED | OUTPATIENT
Start: 2019-08-02 | End: 2019-08-02

## 2019-08-02 RX ORDER — SODIUM CHLORIDE, SODIUM LACTATE, POTASSIUM CHLORIDE, CALCIUM CHLORIDE 600; 310; 30; 20 MG/100ML; MG/100ML; MG/100ML; MG/100ML
INJECTION, SOLUTION INTRAVENOUS CONTINUOUS
Status: DISCONTINUED | OUTPATIENT
Start: 2019-08-02 | End: 2019-08-02 | Stop reason: HOSPADM

## 2019-08-02 RX ORDER — EPHEDRINE SULFATE 50 MG/ML
INJECTION, SOLUTION INTRAVENOUS
Status: DISCONTINUED | OUTPATIENT
Start: 2019-08-02 | End: 2019-08-02

## 2019-08-02 RX ORDER — MIDAZOLAM HYDROCHLORIDE 1 MG/ML
0.5 INJECTION INTRAMUSCULAR; INTRAVENOUS
Status: DISCONTINUED | OUTPATIENT
Start: 2019-08-02 | End: 2019-08-02 | Stop reason: HOSPADM

## 2019-08-02 RX ORDER — ONDANSETRON 2 MG/ML
INJECTION INTRAMUSCULAR; INTRAVENOUS
Status: DISCONTINUED | OUTPATIENT
Start: 2019-08-02 | End: 2019-08-02

## 2019-08-02 RX ORDER — ROCURONIUM BROMIDE 10 MG/ML
INJECTION, SOLUTION INTRAVENOUS
Status: DISCONTINUED | OUTPATIENT
Start: 2019-08-02 | End: 2019-08-02

## 2019-08-02 RX ORDER — BUPIVACAINE HYDROCHLORIDE 5 MG/ML
INJECTION, SOLUTION EPIDURAL; INTRACAUDAL
Status: COMPLETED | OUTPATIENT
Start: 2019-08-02 | End: 2019-08-02

## 2019-08-02 RX ORDER — FENTANYL CITRATE 50 UG/ML
25 INJECTION, SOLUTION INTRAMUSCULAR; INTRAVENOUS EVERY 5 MIN PRN
Status: CANCELLED | OUTPATIENT
Start: 2019-08-02 | End: 2019-08-02

## 2019-08-02 RX ORDER — OXYCODONE HYDROCHLORIDE 5 MG/1
5 TABLET ORAL
Status: CANCELLED | OUTPATIENT
Start: 2019-08-02

## 2019-08-02 RX ADMIN — SODIUM CHLORIDE, SODIUM LACTATE, POTASSIUM CHLORIDE, AND CALCIUM CHLORIDE: .6; .31; .03; .02 INJECTION, SOLUTION INTRAVENOUS at 01:08

## 2019-08-02 RX ADMIN — EPHEDRINE SULFATE 5 MG: 50 INJECTION, SOLUTION INTRAMUSCULAR; INTRAVENOUS; SUBCUTANEOUS at 01:08

## 2019-08-02 RX ADMIN — BUPIVACAINE 15 ML: 13.3 INJECTION, SUSPENSION, LIPOSOMAL INFILTRATION at 11:08

## 2019-08-02 RX ADMIN — GLYCOPYRROLATE 0.2 MG: 0.2 INJECTION, SOLUTION INTRAMUSCULAR; INTRAVENOUS at 12:08

## 2019-08-02 RX ADMIN — ACETAMINOPHEN 1000 MG: 10 INJECTION, SOLUTION INTRAVENOUS at 12:08

## 2019-08-02 RX ADMIN — SODIUM CHLORIDE, SODIUM LACTATE, POTASSIUM CHLORIDE, AND CALCIUM CHLORIDE: .6; .31; .03; .02 INJECTION, SOLUTION INTRAVENOUS at 10:08

## 2019-08-02 RX ADMIN — ONDANSETRON 4 MG: 2 INJECTION, SOLUTION INTRAMUSCULAR; INTRAVENOUS at 01:08

## 2019-08-02 RX ADMIN — GLYCOPYRROLATE 0.4 MG: 0.2 INJECTION, SOLUTION INTRAMUSCULAR; INTRAVENOUS at 01:08

## 2019-08-02 RX ADMIN — FENTANYL CITRATE 100 MCG: 50 INJECTION INTRAMUSCULAR; INTRAVENOUS at 10:08

## 2019-08-02 RX ADMIN — LIDOCAINE HYDROCHLORIDE 10 MG: 10 INJECTION, SOLUTION EPIDURAL; INFILTRATION; INTRACAUDAL; PERINEURAL at 10:08

## 2019-08-02 RX ADMIN — FENTANYL CITRATE 50 MCG: 50 INJECTION, SOLUTION INTRAMUSCULAR; INTRAVENOUS at 12:08

## 2019-08-02 RX ADMIN — BUPIVACAINE HYDROCHLORIDE 10 ML: 5 INJECTION, SOLUTION EPIDURAL; INTRACAUDAL; PERINEURAL at 11:08

## 2019-08-02 RX ADMIN — MIDAZOLAM HYDROCHLORIDE 2 MG: 1 INJECTION, SOLUTION INTRAMUSCULAR; INTRAVENOUS at 10:08

## 2019-08-02 RX ADMIN — ROCURONIUM BROMIDE 30 MG: 10 INJECTION, SOLUTION INTRAVENOUS at 12:08

## 2019-08-02 RX ADMIN — PROPOFOL 200 MG: 10 INJECTION, EMULSION INTRAVENOUS at 12:08

## 2019-08-02 RX ADMIN — NEOSTIGMINE METHYLSULFATE 3 MG: 1 INJECTION INTRAVENOUS at 01:08

## 2019-08-02 RX ADMIN — LIDOCAINE HYDROCHLORIDE 100 MG: 20 INJECTION PARENTERAL at 12:08

## 2019-08-02 RX ADMIN — CEFAZOLIN SODIUM 2 G: 2 SOLUTION INTRAVENOUS at 12:08

## 2019-08-02 NOTE — OP NOTE
DATE OF PROCEDURE: 8/2/2019     PATIENT: Alisha Kirk     SURGEON: Willard Candelario MD.     ASSISTANT: LALI Norwood     PREOPERATIVE DIAGNOSIS:  Left radius periprosthetic fracture     POSTOPERATIVE DIAGNOSIS: same     PROCEDURE:  1. Open reduction and internal fixation radial shaft        2. Revision open reduction internal fixation radial nonunion    ANESTHESIA: General endotracheal.     TOURNIQUET TIME: 22 minutes.     IMPLANTS: Maria Esther small frag set     ESTIMATED BLOOD LOSS: Minimal.     COMPLICATIONS: None.     All counts were correct at the end of the case.     BRIEF HISTORY: This is a 35 yo female who presented to my clinic complaining of pain and inability to bear weight to the arm 10 days after revision ORIF for a radial nonunion. She notes she was pushing herself up off the floor and noted a pop and pain. Xrays show new fracture at the proximal portion of the plate. The surgical versus nonsurgical benefits and risks were discussed with patient, and they opted for surgical treatment.     PROCEDURE IN DETAIL: After the proper consents were obtained, the patient wheeled to the OR suite, placed in a supine position with the arm on an arm board. A nonsterile tourniquet was placed on the upper arm. The entire extremity was prepped and draped in a normal sterile fashion. After timeout confirmed the correct extremity, Ancef 2 grams IV was given, and TEDs and SCDS were noted to the bilateral lower extremities, old dorsal incision was used and the old approach to the radial shaft was made. Fracture site was visualized, plate was removed without incidence, and the fracture was visualized. New fracture was also visualized and a plate was selected to adequately cover both fractures. Plate was placed, radial bow was restored, and a screw was placed on either side of the fracture and nonunion. This was checked on fluoroscopy to be in good position. Distal and proximal cortical screws were then placed as well as  cortical screws between the two fractures. Good compression was noted across the fracture.  Full pronation and supination was noted. Wound was copiously irrigated with normal saline. Subcutaneous tissue was closed with 2-0 Vicryl. Skin was closed with a stapler. Xeroform, 4 x 4's, cast padding, and Ace wrap was placed. The patient was extubated in the OR suite and wheeled to recovery in stable condition.        PLAN: They will be nonweightbearing to that extremity. I stressed the importance once again. They can begin range of motion in 3-4 days. Staples out in 2 weeks.

## 2019-08-02 NOTE — PLAN OF CARE
Patient tolerating oral liquids without difficulty. No apparent s&s of distress noted at this time, no complaints voiced at this time. Dressing C,D,I to left arm. Pt brought sling from home to use. Discharge instructions reviewed with patient/family/friend with good verbal feedback received. Patient ready for discharge

## 2019-08-02 NOTE — ANESTHESIA PREPROCEDURE EVALUATION
08/02/2019  Alisha Kirk is a 34 y.o., female.    Pre-op Assessment    I have reviewed the Patient Summary Reports.     I have reviewed the Nursing Notes.   I have reviewed the Medications.     Review of Systems  Cardiovascular:   Hypertension  Hypertension    Endocrine:   Diabetes, well controlled  Diabetes    Psych:   anxiety depression          Physical Exam  General:  Morbid Obesity    Airway/Jaw/Neck:  Airway Findings: Mouth Opening: Normal Tongue: Normal  General Airway Assessment: Adult  Mallampati: III  Improves to II with phonation.  Jaw/Neck Findings:  Neck ROM: Normal ROM      Dental:  Dental Findings: In tact, Periodontal disease, Mild   Chest/Lungs:  Chest/Lungs Findings: Clear to auscultation, Normal Respiratory Rate         Mental Status:  Mental Status Findings:  Cooperative, Alert and Oriented         Anesthesia Plan  Type of Anesthesia, risks & benefits discussed:  Anesthesia Type:  general, regional  Patient's Preference:   Intra-op Monitoring Plan: standard ASA monitors  Intra-op Monitoring Plan Comments:   Post Op Pain Control Plan: multimodal analgesia and peripheral nerve block  Post Op Pain Control Plan Comments:   Induction:   IV and Inhalation  Beta Blocker:  Patient is on a Beta-Blocker and has received one dose within the past 24 hours (No further documentation required).       Informed Consent: Patient understands risks and agrees with Anesthesia plan.  Questions answered. Anesthesia consent signed with patient.  ASA Score: 2     Day of Surgery Review of History & Physical:            Ready For Surgery From Anesthesia Perspective.

## 2019-08-02 NOTE — TRANSFER OF CARE
"Anesthesia Transfer of Care Note    Patient: April C Reagan    Procedure(s) Performed: Procedure(s) (LRB):  Revision ORIF, FRACTURE, RADIAL SHAFT (Left)    Patient location: PACU    Anesthesia Type: general    Transport from OR: Transported from OR on 2-3 L/min O2 by NC with adequate spontaneous ventilation    Post pain: adequate analgesia    Post assessment: no apparent anesthetic complications and tolerated procedure well    Post vital signs: stable    Level of consciousness: awake    Nausea/Vomiting: no nausea/vomiting    Complications: none    Transfer of care protocol was followed      Last vitals:   Visit Vitals  BP (!) 105/53 (BP Location: Right arm, Patient Position: Lying)   Pulse 70   Temp 36.5 °C (97.7 °F) (Skin)   Resp 16   Ht 5' 2" (1.575 m)   Wt 104.3 kg (229 lb 15 oz)   LMP 07/16/2019   SpO2 98%   Breastfeeding? No   BMI 42.06 kg/m²     "

## 2019-08-02 NOTE — DISCHARGE INSTRUCTIONS
AFTER HAND SURGERY    DOS:   Keep affected wrist elevated above the level of your heart   Check circulation frequently in fingers by pressing on the nail bed. Nail bed should turn white and then pink when released.   Exercise fingers to promote circulation.   Advance diet as tolerated   Resume home medications today    Keep splint dressing clean and dry for two days by covering it with 2 plastic bags secured with rubber bands above your dressing. Dressing changes with gauze and ace wrap. Use sling to left arm for next two days or until block wears off.    DONT:   No driving for 24 hours or while taking narcotic pain medication   DO NOT TAKE ADDITIONAL TYLENOL/ACETAMINOPHEN WHILE TAKING NARCOTIC PAIN MEDICATION THAT CONTAINS TYLENOL/ACETAMINOPHEN.    CALL PHYSICIAN FOR:   Obvious bleeding   Excessive swelling   Drainage (pus) from the wound   Pain unrelieved by pain medication.    FOR EMERGENCIES:  Contact Dr. Candelario at [phone]    Exparel(bupivacaine) has been injected to provide approximately 72 hours of reduced pain after your surgery.  Do not remove the bracelet for five days  Report to your doctor as soon as possible the following:   Restlessness   Anxiety   Speech problems,    Lightheadedness   Numbness and tingling of the mouth and lips   Seizures    Metallic taste   Blurred vision   Tremors    Twitching   Depression   Extreme drowsiness  Avoid additional use of local anesthetics (such as dental procedures) for five days (96 hours)

## 2019-08-02 NOTE — ANESTHESIA PROCEDURE NOTES
Peripheral Block    Patient location during procedure: pre-op   Block not for primary anesthetic.  Reason for block: at surgeon's request and post-op pain management   Post-op Pain Location: left arm  Start time: 8/2/2019 10:52 AM  Timeout: 8/2/2019 10:51 AM   End time: 8/2/2019 11:01 AM    Staffing  Authorizing Provider: Echo Jasmine MD  Performing Provider: Echo Jasmine MD    Preanesthetic Checklist  Completed: patient identified, site marked, surgical consent, pre-op evaluation, timeout performed, IV checked, risks and benefits discussed and monitors and equipment checked  Peripheral Block  Patient position: supine  Prep: ChloraPrep  Patient monitoring: heart rate, cardiac monitor, continuous pulse ox, continuous capnometry and frequent blood pressure checks  Block type: supraclavicular  Laterality: left  Injection technique: single shot  Needle  Needle type: Stimuplex   Needle gauge: 22 G  Needle length: 2 in  Needle localization: anatomical landmarks and ultrasound guidance   -ultrasound image captured on disc.  Assessment  Injection assessment: negative aspiration, negative parasthesia and local visualized surrounding nerve  Paresthesia pain: none  Heart rate change: no  Slow fractionated injection: yes  Additional Notes  VSS.  [facility] RN monitoring vitals throughout procedure.  Patient tolerated procedure well.     Exparel 15 mL

## 2019-08-02 NOTE — INTERVAL H&P NOTE
The patient has been examined and the H&P has been reviewed:    I concur with the findings and no changes have occurred since H&P was written.    Anesthesia/Surgery risks, benefits and alternative options discussed and understood by patient/family.          Active Hospital Problems    Diagnosis  POA    Closed displaced transverse fracture of shaft of left radius [S52.169A]  Yes      Resolved Hospital Problems   No resolved problems to display.

## 2019-08-02 NOTE — DISCHARGE SUMMARY
[facility]  Discharge Note  Short Stay    Admit Date: 8/2/2019    Discharge Date and Time: 8/2/2019       Attending Physician: Willard Candelario MD     Discharge Provider: Willard Candelario    Diagnoses:  Active Hospital Problems    Diagnosis  POA    *Closed displaced transverse fracture of shaft of left radius [S52.322A]  Yes      Resolved Hospital Problems   No resolved problems to display.       Discharged Condition: good    Hospital Course: Patient was admitted for an outpatient procedure and tolerated the procedure well with no complications.    Final Diagnoses: Same as principal problem.    Disposition: Home or Self Care    Follow up/Patient Instructions:    Medications:  Reconciled Home Medications:      Medication List      CHANGE how you take these medications    oxyCODONE-acetaminophen  mg per tablet  Commonly known as:  PERCOCET  Take 1 tablet by mouth every 4 (four) hours as needed for Pain.  What changed:  when to take this        CONTINUE taking these medications    atorvastatin 20 MG tablet  Commonly known as:  LIPITOR  TAKE ONE TABLET BY MOUTH AT BEDTIME; Instr DOSE increased TO 20mg, discontinue 10mg     busPIRone 15 MG tablet  Commonly known as:  BUSPAR  Take 15 mg by mouth 3 (three) times daily.     ergocalciferol 50,000 unit Cap  Commonly known as:  ERGOCALCIFEROL  Take 50,000 Units by mouth every 7 days.     escitalopram oxalate 20 MG tablet  Commonly known as:  LEXAPRO  Take 20 mg by mouth once daily.     fluticasone propionate 50 mcg/actuation nasal spray  Commonly known as:  FLONASE  ADMINISTER ONE SPRAY IN EACH NOSTRIL TWICE DAILY     JARDIANCE 10 mg Tab  Generic drug:  empagliflozin  TAKE ONE TABLET BY MOUTH EVERY DAY IN THE MORNING STOP TRADJENTA     levothyroxine 50 MCG tablet  Commonly known as:  SYNTHROID  Take 50 mcg by mouth once daily.     medroxyPROGESTERone 10 MG tablet  Commonly known as:  PROVERA  TAKE ONE TABLET BY MOUTH EVERY DAY FOR 10 DAYS ON DAYS 15-24 of  menses.     metoprolol tartrate 50 MG tablet  Commonly known as:  LOPRESSOR  Take 50 mg by mouth 2 (two) times daily.     mirtazapine 15 MG tablet  Commonly known as:  REMERON  Take 15 mg by mouth nightly.     montelukast 10 mg tablet  Commonly known as:  SINGULAIR  Take 10 mg by mouth every evening.     OXcarbazepine 600 MG Tab  Commonly known as:  TRILEPTAL  Take 150 mg by mouth 2 (two) times daily.     risperiDONE 2 MG tablet  Commonly known as:  RISPERDAL  Take 2 mg by mouth once daily.     silver sulfADIAZINE 1% 1 % cream  Commonly known as:  SILVADENE  APPLY TO THE AFFECTED AREA(S) TWICE DAILY     spironolactone 25 MG tablet  Commonly known as:  ALDACTONE  Take 25 mg by mouth 2 (two) times daily.     TRUEPLUS LANCETS 30 gauge Misc  Generic drug:  lancets  USE TO CHECK BLOOD SUGAR FIVE TIMES DAILY        STOP taking these medications    celecoxib 200 MG capsule  Commonly known as:  CeleBREX     ibuprofen 800 MG tablet  Commonly known as:  ADVIL,MOTRIN        ASK your doctor about these medications    nabumetone 750 MG tablet  Commonly known as:  RELAFEN  Take 750 mg by mouth 2 (two) times daily.          Discharge Procedure Orders   Diet Adult Regular     Lifting restrictions     Notify your health care provider if you experience any of the following:  redness, tenderness, or signs of infection (pain, swelling, redness, odor or green/yellow discharge around incision site)     Change dressing (specify)   Order Comments: Dressing change: 1 times per day using gauze and ace.         Discharge Procedure Orders (must include Diet, Follow-up, Activity):   Discharge Procedure Orders (must include Diet, Follow-up, Activity)   Diet Adult Regular     Lifting restrictions     Notify your health care provider if you experience any of the following:  redness, tenderness, or signs of infection (pain, swelling, redness, odor or green/yellow discharge around incision site)     Change dressing (specify)   Order Comments:  Dressing change: 1 times per day using gauze and ace.

## 2019-08-02 NOTE — ANESTHESIA POSTPROCEDURE EVALUATION
Anesthesia Post Evaluation    Patient: April C Reagan    Procedure(s) Performed: Procedure(s) (LRB):  Revision ORIF, FRACTURE, RADIAL SHAFT (Left)    Final Anesthesia Type: general  Patient location during evaluation: PACU  Patient participation: Yes- Able to Participate  Level of consciousness: awake and alert  Post-procedure vital signs: reviewed and stable  Pain management: adequate  Airway patency: patent  PONV status at discharge: No PONV  Anesthetic complications: no      Cardiovascular status: blood pressure returned to baseline and hemodynamically stable  Respiratory status: unassisted  Hydration status: euvolemic  Follow-up not needed.          Vitals Value Taken Time   /85 8/2/2019  1:31 PM   Temp 36.3 °C (97.3 °F) 8/2/2019  1:22 PM   Pulse 81 8/2/2019  1:31 PM   Resp 12 8/2/2019  1:31 PM   SpO2 100 % 8/2/2019  1:31 PM         Event Time     Out of Recovery 13:32:20          Pain/Haleigh Score: Pain Rating Prior to Med Admin: 7 (8/2/2019 11:05 AM)  Haleigh Score: 9 (8/2/2019  1:22 PM)

## 2019-08-05 NOTE — ADDENDUM NOTE
Addendum  created 08/05/19 1323 by Ritesh Adkins MD    Attestation recorded in Intraprocedure, Intraprocedure Attestations filed

## 2019-08-14 DIAGNOSIS — Z87.81 S/P ORIF (OPEN REDUCTION INTERNAL FIXATION) FRACTURE: ICD-10-CM

## 2019-08-14 DIAGNOSIS — Z98.890 S/P ORIF (OPEN REDUCTION INTERNAL FIXATION) FRACTURE: ICD-10-CM

## 2019-08-14 DIAGNOSIS — S52.322K CLOSED DISPLACED TRANSVERSE FRACTURE OF SHAFT OF LEFT RADIUS WITH NONUNION, SUBSEQUENT ENCOUNTER: Primary | ICD-10-CM

## 2019-08-14 RX ORDER — IBUPROFEN 800 MG/1
TABLET ORAL
COMMUNITY
Start: 2019-08-12 | End: 2020-05-14

## 2019-08-14 RX ORDER — OXYCODONE AND ACETAMINOPHEN 10; 325 MG/1; MG/1
1 TABLET ORAL EVERY 6 HOURS PRN
Qty: 44 TABLET | Refills: 0 | Status: SHIPPED | OUTPATIENT
Start: 2019-08-14 | End: 2019-08-14 | Stop reason: SDUPTHER

## 2019-08-14 RX ORDER — OXYCODONE AND ACETAMINOPHEN 10; 325 MG/1; MG/1
1 TABLET ORAL EVERY 6 HOURS PRN
Qty: 44 TABLET | Refills: 0 | Status: SHIPPED | OUTPATIENT
Start: 2019-08-14 | End: 2019-08-20 | Stop reason: SDUPTHER

## 2019-08-14 RX ORDER — CELECOXIB 200 MG/1
CAPSULE ORAL
COMMUNITY
Start: 2019-08-12 | End: 2019-12-19

## 2019-08-14 RX ORDER — CALCIUM CITRATE/VITAMIN D3 200MG-6.25
TABLET ORAL
COMMUNITY
Start: 2019-08-12

## 2019-08-14 RX ORDER — METHYLDOPA 500 MG
160 TABLET ORAL DAILY
Refills: 0 | COMMUNITY
Start: 2019-08-05 | End: 2022-05-16 | Stop reason: CLARIF

## 2019-08-14 NOTE — TELEPHONE ENCOUNTER
"Called Tiffanie back. She states that in order to fill pts Rx, we need to re-send it with "Medically Necessary" written on the Rx. I have added that to the Pharmacy Comments and pended the Rx. Please send it again. Thanks, Lennie"

## 2019-08-14 NOTE — TELEPHONE ENCOUNTER
----- Message from Saba Jimenez sent at 8/14/2019  9:00 AM CDT -----  Type:  Pharmacy Calling to Clarify an RX    Name of Caller:  Tiffanie  Pharmacy Name:  [facility]  Prescription Name:  oxyCODONE-acetaminophen (PERCOCET)  mg per tablet   What do they need to clarify?:  Law states that prescription must include anything over 7 days is medically necessary.  Best Call Back Number: [phone]

## 2019-08-20 ENCOUNTER — HOSPITAL ENCOUNTER (OUTPATIENT)
Dept: RADIOLOGY | Facility: HOSPITAL | Age: 34
Discharge: HOME | End: 2019-08-20
Attending: ORTHOPAEDIC SURGERY
Payer: COMMERCIAL

## 2019-08-20 ENCOUNTER — OFFICE VISIT (OUTPATIENT)
Dept: ORTHOPEDICS | Facility: CLINIC | Age: 34
End: 2019-08-20
Payer: COMMERCIAL

## 2019-08-20 VITALS
WEIGHT: 229.75 LBS | DIASTOLIC BLOOD PRESSURE: 69 MMHG | HEART RATE: 66 BPM | HEIGHT: 62 IN | BODY MASS INDEX: 42.28 KG/M2 | SYSTOLIC BLOOD PRESSURE: 142 MMHG

## 2019-08-20 DIAGNOSIS — Z87.81 S/P ORIF (OPEN REDUCTION INTERNAL FIXATION) FRACTURE: ICD-10-CM

## 2019-08-20 DIAGNOSIS — S52.322K CLOSED DISPLACED TRANSVERSE FRACTURE OF SHAFT OF LEFT RADIUS WITH NONUNION, SUBSEQUENT ENCOUNTER: ICD-10-CM

## 2019-08-20 DIAGNOSIS — Z98.890 S/P ORIF (OPEN REDUCTION INTERNAL FIXATION) FRACTURE: ICD-10-CM

## 2019-08-20 DIAGNOSIS — S52.322K: Primary | ICD-10-CM

## 2019-08-20 PROCEDURE — 73090 XR FOREARM LEFT: ICD-10-PCS | Mod: 26,LT,, | Performed by: RADIOLOGY

## 2019-08-20 PROCEDURE — 99999 PR PBB SHADOW E&M-EST. PATIENT-LVL III: ICD-10-PCS | Mod: PBBFAC,,, | Performed by: ORTHOPAEDIC SURGERY

## 2019-08-20 PROCEDURE — 99024 POSTOP FOLLOW-UP VISIT: CPT | Mod: S$GLB,,, | Performed by: ORTHOPAEDIC SURGERY

## 2019-08-20 PROCEDURE — 99999 PR PBB SHADOW E&M-EST. PATIENT-LVL III: CPT | Mod: PBBFAC,,, | Performed by: ORTHOPAEDIC SURGERY

## 2019-08-20 PROCEDURE — 73090 X-RAY EXAM OF FOREARM: CPT | Mod: TC,PO,LT

## 2019-08-20 PROCEDURE — 73090 X-RAY EXAM OF FOREARM: CPT | Mod: 26,LT,, | Performed by: RADIOLOGY

## 2019-08-20 PROCEDURE — 99024 PR POST-OP FOLLOW-UP VISIT: ICD-10-PCS | Mod: S$GLB,,, | Performed by: ORTHOPAEDIC SURGERY

## 2019-08-20 RX ORDER — OXYCODONE AND ACETAMINOPHEN 10; 325 MG/1; MG/1
1 TABLET ORAL EVERY 6 HOURS PRN
Qty: 44 TABLET | Refills: 0 | Status: SHIPPED | OUTPATIENT
Start: 2019-08-20 | End: 2019-09-03

## 2019-08-20 NOTE — PROGRESS NOTES
Chief Complaint   Patient presents with    Post-op Evaluation     ORIF left forearm revision 8/2/19       HPI:  34 y.o. returns to clinic today status post revision ORIF left radius 2 weeks ago. Pain is moderate. Patient is compliant most of the time with restrictions.     Left forearm  Incision well approximated with staples . No erythema or fluctuance. Overall normal alignment. Mild point TTP about the fracture site. Decreased ROM due to stiffness. Compartments soft. Skin intact. NVI distally.        X-rays were performed today, personally reviewed by me and findings discussed with the patient.  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  Dispense: 44 tablet; Refill: 0    S/P ORIF (open reduction internal fixation) fracture  -     oxyCODONE-acetaminophen (PERCOCET)  mg per tablet; Take 1 tablet by mouth every 6 (six) hours as needed for Pain.  Dispense: 44 tablet; Refill: 0        DC staples, applied steri strips. Begin bone stimulator.   RTC 4 weeks with repeat Xrays.

## 2019-09-03 DIAGNOSIS — Z87.81 S/P ORIF (OPEN REDUCTION INTERNAL FIXATION) FRACTURE: ICD-10-CM

## 2019-09-03 DIAGNOSIS — S52.322K CLOSED DISPLACED TRANSVERSE FRACTURE OF SHAFT OF LEFT RADIUS WITH NONUNION, SUBSEQUENT ENCOUNTER: ICD-10-CM

## 2019-09-03 DIAGNOSIS — Z98.890 S/P ORIF (OPEN REDUCTION INTERNAL FIXATION) FRACTURE: ICD-10-CM

## 2019-09-03 RX ORDER — OXYCODONE AND ACETAMINOPHEN 10; 325 MG/1; MG/1
1 TABLET ORAL EVERY 6 HOURS PRN
Qty: 44 TABLET | Refills: 0 | Status: CANCELLED | OUTPATIENT
Start: 2019-09-03

## 2019-09-03 RX ORDER — OXYCODONE AND ACETAMINOPHEN 5; 325 MG/1; MG/1
1 TABLET ORAL EVERY 6 HOURS PRN
Qty: 44 TABLET | Refills: 0 | Status: SHIPPED | OUTPATIENT
Start: 2019-09-03 | End: 2019-09-11 | Stop reason: SDUPTHER

## 2019-09-11 DIAGNOSIS — Z87.81 S/P ORIF (OPEN REDUCTION INTERNAL FIXATION) FRACTURE: ICD-10-CM

## 2019-09-11 DIAGNOSIS — Z98.890 S/P ORIF (OPEN REDUCTION INTERNAL FIXATION) FRACTURE: ICD-10-CM

## 2019-09-11 DIAGNOSIS — S52.322K CLOSED DISPLACED TRANSVERSE FRACTURE OF SHAFT OF LEFT RADIUS WITH NONUNION, SUBSEQUENT ENCOUNTER: ICD-10-CM

## 2019-09-11 RX ORDER — OXYCODONE AND ACETAMINOPHEN 5; 325 MG/1; MG/1
1 TABLET ORAL EVERY 6 HOURS PRN
Qty: 44 TABLET | Refills: 0 | Status: SHIPPED | OUTPATIENT
Start: 2019-09-11 | End: 2019-09-17 | Stop reason: SDUPTHER

## 2019-09-12 DIAGNOSIS — S52.322K CLOSED DISPLACED TRANSVERSE FRACTURE OF SHAFT OF LEFT RADIUS WITH NONUNION, SUBSEQUENT ENCOUNTER: Primary | ICD-10-CM

## 2019-09-12 DIAGNOSIS — Z87.81 S/P ORIF (OPEN REDUCTION INTERNAL FIXATION) FRACTURE: ICD-10-CM

## 2019-09-12 DIAGNOSIS — Z98.890 S/P ORIF (OPEN REDUCTION INTERNAL FIXATION) FRACTURE: ICD-10-CM

## 2019-09-17 ENCOUNTER — OFFICE VISIT (OUTPATIENT)
Dept: ORTHOPEDICS | Facility: CLINIC | Age: 34
End: 2019-09-17
Payer: COMMERCIAL

## 2019-09-17 ENCOUNTER — HOSPITAL ENCOUNTER (OUTPATIENT)
Dept: RADIOLOGY | Facility: HOSPITAL | Age: 34
Discharge: HOME | End: 2019-09-17
Attending: ORTHOPAEDIC SURGERY
Payer: COMMERCIAL

## 2019-09-17 VITALS
BODY MASS INDEX: 42.14 KG/M2 | SYSTOLIC BLOOD PRESSURE: 139 MMHG | DIASTOLIC BLOOD PRESSURE: 91 MMHG | WEIGHT: 229 LBS | HEART RATE: 67 BPM | HEIGHT: 62 IN

## 2019-09-17 DIAGNOSIS — S52.322K CLOSED DISPLACED TRANSVERSE FRACTURE OF SHAFT OF LEFT RADIUS WITH NONUNION, SUBSEQUENT ENCOUNTER: ICD-10-CM

## 2019-09-17 DIAGNOSIS — Z98.890 S/P ORIF (OPEN REDUCTION INTERNAL FIXATION) FRACTURE: ICD-10-CM

## 2019-09-17 DIAGNOSIS — Z87.81 S/P ORIF (OPEN REDUCTION INTERNAL FIXATION) FRACTURE: ICD-10-CM

## 2019-09-17 DIAGNOSIS — S52.322D CLOSED DISPLACED TRANSVERSE FRACTURE OF SHAFT OF LEFT RADIUS WITH ROUTINE HEALING, SUBSEQUENT ENCOUNTER: Primary | ICD-10-CM

## 2019-09-17 PROCEDURE — 73090 XR FOREARM LEFT: ICD-10-PCS | Mod: 26,LT,, | Performed by: RADIOLOGY

## 2019-09-17 PROCEDURE — 73090 X-RAY EXAM OF FOREARM: CPT | Mod: TC,PO,LT

## 2019-09-17 PROCEDURE — 99024 POSTOP FOLLOW-UP VISIT: CPT | Mod: S$GLB,,, | Performed by: ORTHOPAEDIC SURGERY

## 2019-09-17 PROCEDURE — 73090 X-RAY EXAM OF FOREARM: CPT | Mod: 26,LT,, | Performed by: RADIOLOGY

## 2019-09-17 PROCEDURE — 99024 PR POST-OP FOLLOW-UP VISIT: ICD-10-PCS | Mod: S$GLB,,, | Performed by: ORTHOPAEDIC SURGERY

## 2019-09-17 PROCEDURE — 99999 PR PBB SHADOW E&M-EST. PATIENT-LVL III: CPT | Mod: PBBFAC,,, | Performed by: ORTHOPAEDIC SURGERY

## 2019-09-17 PROCEDURE — 99999 PR PBB SHADOW E&M-EST. PATIENT-LVL III: ICD-10-PCS | Mod: PBBFAC,,, | Performed by: ORTHOPAEDIC SURGERY

## 2019-09-17 RX ORDER — OXYCODONE AND ACETAMINOPHEN 5; 325 MG/1; MG/1
1 TABLET ORAL EVERY 6 HOURS PRN
Qty: 44 TABLET | Refills: 0 | Status: SHIPPED | OUTPATIENT
Start: 2019-09-17 | End: 2019-09-30 | Stop reason: SDUPTHER

## 2019-09-17 NOTE — PROGRESS NOTES
Chief Complaint   Patient presents with    Left Forearm - Post-op Evaluation    Post-op Evaluation     revision  ORIF left forearm 8/2/19       HPI:  34 y.o. returns to clinic today status post ORIF left radius 6 weeks ago. Pain is moderate. Patient is compliant most of the time with restrictions.      Left forearm  Incision healed. No erythema or fluctuance. Overall normal alignment. Mild point TTP about the fracture site. Decreased ROM due to stiffness. Compartments soft. Skin intact. NVI distally.     X-rays were performed today, personally reviewed by me and findings discussed with the patient.  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  Dispense: 44 tablet; Refill: 0    S/P ORIF (open reduction internal fixation) fracture  -     oxyCODONE-acetaminophen (PERCOCET) 5-325 mg per tablet; Take 1 tablet by mouth every 6 (six) hours as needed (severe post opertative pain).  Dispense: 44 tablet; Refill: 0        OT referral. Advance weightbearing. RTC 2 months with repeat Xrays.

## 2019-09-30 ENCOUNTER — TELEPHONE (OUTPATIENT)
Dept: ORTHOPEDICS | Facility: CLINIC | Age: 34
End: 2019-09-30

## 2019-09-30 DIAGNOSIS — Z98.890 S/P ORIF (OPEN REDUCTION INTERNAL FIXATION) FRACTURE: ICD-10-CM

## 2019-09-30 DIAGNOSIS — S52.322K CLOSED DISPLACED TRANSVERSE FRACTURE OF SHAFT OF LEFT RADIUS WITH NONUNION, SUBSEQUENT ENCOUNTER: ICD-10-CM

## 2019-09-30 DIAGNOSIS — Z87.81 S/P ORIF (OPEN REDUCTION INTERNAL FIXATION) FRACTURE: ICD-10-CM

## 2019-09-30 RX ORDER — OXYCODONE AND ACETAMINOPHEN 5; 325 MG/1; MG/1
1 TABLET ORAL EVERY 8 HOURS PRN
Qty: 44 TABLET | Refills: 0 | Status: SHIPPED | OUTPATIENT
Start: 2019-09-30 | End: 2019-12-19

## 2019-10-11 DIAGNOSIS — Z98.890 S/P ORIF (OPEN REDUCTION INTERNAL FIXATION) FRACTURE: ICD-10-CM

## 2019-10-11 DIAGNOSIS — Z87.81 S/P ORIF (OPEN REDUCTION INTERNAL FIXATION) FRACTURE: ICD-10-CM

## 2019-10-11 DIAGNOSIS — S52.322K CLOSED DISPLACED TRANSVERSE FRACTURE OF SHAFT OF LEFT RADIUS WITH NONUNION, SUBSEQUENT ENCOUNTER: ICD-10-CM

## 2019-10-11 RX ORDER — OXYCODONE AND ACETAMINOPHEN 5; 325 MG/1; MG/1
1 TABLET ORAL EVERY 8 HOURS PRN
Qty: 44 TABLET | Refills: 0 | Status: CANCELLED | OUTPATIENT
Start: 2019-10-11

## 2019-10-11 RX ORDER — HYDROCODONE BITARTRATE AND ACETAMINOPHEN 10; 325 MG/1; MG/1
1 TABLET ORAL EVERY 8 HOURS PRN
Qty: 33 TABLET | Refills: 0 | Status: SHIPPED | OUTPATIENT
Start: 2019-10-11 | End: 2019-10-22 | Stop reason: SDUPTHER

## 2019-10-14 ENCOUNTER — PATIENT MESSAGE (OUTPATIENT)
Dept: ORTHOPEDICS | Facility: CLINIC | Age: 34
End: 2019-10-14

## 2019-10-22 DIAGNOSIS — Z98.890 S/P ORIF (OPEN REDUCTION INTERNAL FIXATION) FRACTURE: ICD-10-CM

## 2019-10-22 DIAGNOSIS — Z87.81 S/P ORIF (OPEN REDUCTION INTERNAL FIXATION) FRACTURE: ICD-10-CM

## 2019-10-22 DIAGNOSIS — S52.322K CLOSED DISPLACED TRANSVERSE FRACTURE OF SHAFT OF LEFT RADIUS WITH NONUNION, SUBSEQUENT ENCOUNTER: ICD-10-CM

## 2019-10-22 RX ORDER — HYDROCODONE BITARTRATE AND ACETAMINOPHEN 10; 325 MG/1; MG/1
1 TABLET ORAL EVERY 8 HOURS PRN
Qty: 44 TABLET | Refills: 0 | Status: SHIPPED | OUTPATIENT
Start: 2019-10-22 | End: 2019-12-19

## 2019-11-04 DIAGNOSIS — Z87.81 S/P ORIF (OPEN REDUCTION INTERNAL FIXATION) FRACTURE: ICD-10-CM

## 2019-11-04 DIAGNOSIS — S52.322K CLOSED DISPLACED TRANSVERSE FRACTURE OF SHAFT OF LEFT RADIUS WITH NONUNION, SUBSEQUENT ENCOUNTER: ICD-10-CM

## 2019-11-04 DIAGNOSIS — Z98.890 S/P ORIF (OPEN REDUCTION INTERNAL FIXATION) FRACTURE: ICD-10-CM

## 2019-11-05 ENCOUNTER — PATIENT MESSAGE (OUTPATIENT)
Dept: ORTHOPEDICS | Facility: CLINIC | Age: 34
End: 2019-11-05

## 2019-11-05 DIAGNOSIS — Z98.890 S/P ORIF (OPEN REDUCTION INTERNAL FIXATION) FRACTURE: ICD-10-CM

## 2019-11-05 DIAGNOSIS — G89.29 CHRONIC PAIN OF LEFT UPPER EXTREMITY: Primary | ICD-10-CM

## 2019-11-05 DIAGNOSIS — M79.602 CHRONIC PAIN OF LEFT UPPER EXTREMITY: Primary | ICD-10-CM

## 2019-11-05 DIAGNOSIS — Z87.81 S/P ORIF (OPEN REDUCTION INTERNAL FIXATION) FRACTURE: ICD-10-CM

## 2019-11-05 RX ORDER — HYDROCODONE BITARTRATE AND ACETAMINOPHEN 10; 325 MG/1; MG/1
1 TABLET ORAL EVERY 8 HOURS PRN
Qty: 44 TABLET | Refills: 0 | OUTPATIENT
Start: 2019-11-05

## 2019-11-06 ENCOUNTER — OFFICE VISIT (OUTPATIENT)
Dept: PAIN MEDICINE | Facility: CLINIC | Age: 34
End: 2019-11-06
Payer: COMMERCIAL

## 2019-11-06 VITALS
WEIGHT: 240.31 LBS | DIASTOLIC BLOOD PRESSURE: 84 MMHG | HEIGHT: 62 IN | HEART RATE: 75 BPM | BODY MASS INDEX: 44.22 KG/M2 | SYSTOLIC BLOOD PRESSURE: 129 MMHG

## 2019-11-06 DIAGNOSIS — S52.322K: Primary | ICD-10-CM

## 2019-11-06 DIAGNOSIS — M79.602 LEFT ARM PAIN: ICD-10-CM

## 2019-11-06 PROCEDURE — 99999 PR PBB SHADOW E&M-EST. PATIENT-LVL III: ICD-10-PCS | Mod: PBBFAC,,, | Performed by: ANESTHESIOLOGY

## 2019-11-06 PROCEDURE — 99244 PR OFFICE CONSULTATION,LEVEL IV: ICD-10-PCS | Mod: S$GLB,,, | Performed by: ANESTHESIOLOGY

## 2019-11-06 PROCEDURE — 99999 PR PBB SHADOW E&M-EST. PATIENT-LVL III: CPT | Mod: PBBFAC,,, | Performed by: ANESTHESIOLOGY

## 2019-11-06 PROCEDURE — 99244 OFF/OP CNSLTJ NEW/EST MOD 40: CPT | Mod: S$GLB,,, | Performed by: ANESTHESIOLOGY

## 2019-11-06 RX ORDER — GABAPENTIN 300 MG/1
300 CAPSULE ORAL 3 TIMES DAILY
Qty: 90 CAPSULE | Refills: 0 | Status: SHIPPED | OUTPATIENT
Start: 2019-11-06 | End: 2019-12-02 | Stop reason: SDUPTHER

## 2019-11-06 RX ORDER — HYDROCORTISONE 25 MG/G
CREAM TOPICAL
Refills: 3 | COMMUNITY
Start: 2019-10-28 | End: 2022-05-16 | Stop reason: CLARIF

## 2019-11-06 RX ORDER — KETOCONAZOLE 20 MG/G
CREAM TOPICAL
Refills: 3 | COMMUNITY
Start: 2019-10-28 | End: 2022-05-16 | Stop reason: CLARIF

## 2019-11-06 RX ORDER — BLOOD-GLUCOSE METER
EACH MISCELLANEOUS
Refills: 0 | COMMUNITY
Start: 2019-10-10

## 2019-11-06 RX ORDER — FLUCONAZOLE 200 MG/1
TABLET ORAL
Refills: 0 | COMMUNITY
Start: 2019-10-28 | End: 2019-12-19

## 2019-11-06 RX ORDER — KETOCONAZOLE 20 MG/ML
SHAMPOO, SUSPENSION TOPICAL
Refills: 3 | COMMUNITY
Start: 2019-10-28 | End: 2022-05-16 | Stop reason: CLARIF

## 2019-11-06 NOTE — LETTER
November 6, 2019      Willard Candelario MD  [address] [facility] [address] [place] [address]           [facility]  [address] [place] [address]  Phone: [phone]  Fax: [phone]          Patient: Alisha Kirk   MR Number: [id number]   YOB: 1985   Date of Visit: 11/6/2019       Dear Dr. Willard Candelario:    Thank you for referring Alisha Kirk to me for evaluation. Attached you will find relevant portions of my assessment and plan of care.    If you have questions, please do not hesitate to call me. I look forward to following Alisha Kirk along with you.    Sincerely,    Tree Tapia MD    Enclosure  CC:  No Recipients    If you would like to receive this communication electronically, please contact [email or web address] or [phone] to request more information on [facility] access.    For providers and/or their staff who would like to refer a patient to [facility], please contact us through our one-stop-shop provider referral line, [facility], at [phone].    If you feel you have received this communication in error or would no longer like to receive these types of communications, please e-mail [email or web address]
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
## 2019-11-18 DIAGNOSIS — Z98.890 S/P ORIF (OPEN REDUCTION INTERNAL FIXATION) FRACTURE: Primary | ICD-10-CM

## 2019-11-18 DIAGNOSIS — Z87.81 S/P ORIF (OPEN REDUCTION INTERNAL FIXATION) FRACTURE: Primary | ICD-10-CM

## 2019-11-18 DIAGNOSIS — S52.322K CLOSED DISPLACED TRANSVERSE FRACTURE OF SHAFT OF LEFT RADIUS WITH NONUNION, SUBSEQUENT ENCOUNTER: ICD-10-CM

## 2019-11-19 ENCOUNTER — HOSPITAL ENCOUNTER (OUTPATIENT)
Dept: RADIOLOGY | Facility: HOSPITAL | Age: 34
Discharge: HOME | End: 2019-11-19
Attending: ORTHOPAEDIC SURGERY
Payer: COMMERCIAL

## 2019-11-19 ENCOUNTER — OFFICE VISIT (OUTPATIENT)
Dept: ORTHOPEDICS | Facility: CLINIC | Age: 34
End: 2019-11-19
Payer: COMMERCIAL

## 2019-11-19 VITALS
HEART RATE: 105 BPM | WEIGHT: 240 LBS | HEIGHT: 62 IN | BODY MASS INDEX: 44.16 KG/M2 | DIASTOLIC BLOOD PRESSURE: 100 MMHG | SYSTOLIC BLOOD PRESSURE: 144 MMHG

## 2019-11-19 DIAGNOSIS — Z98.890 S/P ORIF (OPEN REDUCTION INTERNAL FIXATION) FRACTURE: ICD-10-CM

## 2019-11-19 DIAGNOSIS — Z87.81 S/P ORIF (OPEN REDUCTION INTERNAL FIXATION) FRACTURE: ICD-10-CM

## 2019-11-19 DIAGNOSIS — S52.322K CLOSED DISPLACED TRANSVERSE FRACTURE OF SHAFT OF LEFT RADIUS WITH NONUNION, SUBSEQUENT ENCOUNTER: ICD-10-CM

## 2019-11-19 DIAGNOSIS — G56.42 COMPLEX REGIONAL PAIN SYNDROME TYPE 2 OF LEFT UPPER EXTREMITY: ICD-10-CM

## 2019-11-19 DIAGNOSIS — S52.322K: Primary | ICD-10-CM

## 2019-11-19 PROCEDURE — 73090 XR FOREARM LEFT: ICD-10-PCS | Mod: 26,LT,, | Performed by: RADIOLOGY

## 2019-11-19 PROCEDURE — 99214 OFFICE O/P EST MOD 30 MIN: CPT | Mod: S$GLB,,, | Performed by: ORTHOPAEDIC SURGERY

## 2019-11-19 PROCEDURE — 3008F PR BODY MASS INDEX (BMI) DOCUMENTED: ICD-10-PCS | Mod: CPTII,S$GLB,, | Performed by: ORTHOPAEDIC SURGERY

## 2019-11-19 PROCEDURE — 99999 PR PBB SHADOW E&M-EST. PATIENT-LVL III: CPT | Mod: PBBFAC,,, | Performed by: ORTHOPAEDIC SURGERY

## 2019-11-19 PROCEDURE — 73090 X-RAY EXAM OF FOREARM: CPT | Mod: 26,LT,, | Performed by: RADIOLOGY

## 2019-11-19 PROCEDURE — 99999 PR PBB SHADOW E&M-EST. PATIENT-LVL III: ICD-10-PCS | Mod: PBBFAC,,, | Performed by: ORTHOPAEDIC SURGERY

## 2019-11-19 PROCEDURE — 73090 X-RAY EXAM OF FOREARM: CPT | Mod: TC,PO,LT

## 2019-11-19 PROCEDURE — 3008F BODY MASS INDEX DOCD: CPT | Mod: CPTII,S$GLB,, | Performed by: ORTHOPAEDIC SURGERY

## 2019-11-19 PROCEDURE — 99214 PR OFFICE/OUTPT VISIT, EST, LEVL IV, 30-39 MIN: ICD-10-PCS | Mod: S$GLB,,, | Performed by: ORTHOPAEDIC SURGERY

## 2019-11-19 NOTE — PROGRESS NOTES
Chief Complaint   Patient presents with    Forearm  Pain     left forearm pain: ORIF left radius 19: revision 19       HPI:   This is a 34 y.o. who returns today status post left radius revision ORIF 3 months ago. Pain is moderate. Patient is compliant most of the time with restrictions.  Patient has been WBAT.  Pain is aching. No numbness or tingling. No associated signs or symptoms.    Past Medical History:   Diagnosis Date    Anxiety     Depression     Diabetes mellitus     Environmental allergies     Hyperlipidemia     Hypertension     Seizures     last reported 2019    Thyroid disease      Past Surgical History:   Procedure Laterality Date    ABDOMINAL SURGERY          OPEN REDUCTION AND INTERNAL FIXATION (ORIF) OF FRACTURE OF RADIUS Left 2019    Procedure: ORIF, FRACTURE, RADIAL SHAFT;  Surgeon: Ravi Panchal MD;  Location: [facility] OR;  Service: Orthopedics;  Laterality: Left;    OPEN REDUCTION AND INTERNAL FIXATION (ORIF) OF FRACTURE OF RADIUS Left 2019    Procedure: ORIF, FRACTURE, RADIUS;  Surgeon: Willard Candelario MD;  Location: [facility] OR;  Service: Orthopedics;  Laterality: Left;   REVISION ORIF RADIUS  General with block    OPEN REDUCTION AND INTERNAL FIXATION (ORIF) OF FRACTURE OF RADIUS Left 2019    Procedure: Revision ORIF, FRACTURE, RADIAL SHAFT;  Surgeon: Willard Candelario MD;  Location: [facility] OR;  Service: Orthopedics;  Laterality: Left;     Current Outpatient Medications on File Prior to Visit   Medication Sig Dispense Refill    atorvastatin (LIPITOR) 20 MG tablet TAKE ONE TABLET BY MOUTH AT BEDTIME; Instr DOSE increased TO 20mg, discontinue 10mg  3    busPIRone (BUSPAR) 15 MG tablet Take 15 mg by mouth 3 (three) times daily.  0    celecoxib (CELEBREX) 200 MG capsule       ergocalciferol (ERGOCALCIFEROL) 50,000 unit Cap Take 50,000 Units by mouth every 7 days.  0    escitalopram oxalate (LEXAPRO) 20 MG tablet Take 20 mg by mouth once daily.       fluconazole (DIFLUCAN) 200 MG Tab TAKE ONE TABLET BY MOUTH ONCE WEEKLY FOR 4 WEEKS  0    fluticasone propionate (FLONASE) 50 mcg/actuation nasal spray ADMINISTER ONE SPRAY IN EACH NOSTRIL TWICE DAILY  1    gabapentin (NEURONTIN) 300 MG capsule Take 1 capsule (300 mg total) by mouth 3 (three) times daily. 90 capsule 0    hydrocortisone 2.5 % cream APPLY ONE GRAM TO THE AFFECTED AREA(S) TWICE DAILY  3    ibuprofen (ADVIL,MOTRIN) 800 MG tablet       JARDIANCE 10 mg Tab TAKE ONE TABLET BY MOUTH EVERY DAY IN THE MORNING STOP TRADJENTA  1    ketoconazole (NIZORAL) 2 % cream APPLY ONE GRAM TO AFFECTED AREA(S) TWICE DAILY FOR 5-7 DAYS  3    ketoconazole (NIZORAL) 2 % shampoo APPLY ONE APPLICATION TO SCALP IN SHOWER 2-3 TIMES WEEKLY  3    levothyroxine (SYNTHROID) 50 MCG tablet Take 50 mcg by mouth once daily.  2    metoprolol tartrate (LOPRESSOR) 50 MG tablet Take 50 mg by mouth 2 (two) times daily.      mirtazapine (REMERON) 15 MG tablet Take 15 mg by mouth nightly.  2    montelukast (SINGULAIR) 10 mg tablet Take 10 mg by mouth every evening.  2    OXcarbazepine (TRILEPTAL) 600 MG Tab Take 150 mg by mouth 2 (two) times daily.      SLOW RELEASE IRON 160 mg (50 mg iron) TbSR take THREE tabs BY MOUTH DAILY  0    spironolactone (ALDACTONE) 25 MG tablet Take 25 mg by mouth 2 (two) times daily.      TRUE METRIX GLUCOSE METER Misc USE AS DIRECTED TO check blood glucose FIVE TIMES DAILY  0    TRUE METRIX GLUCOSE TEST STRIP Strp       TRUEPLUS LANCETS 30 gauge Misc USE TO CHECK BLOOD SUGAR FIVE TIMES DAILY  5    HYDROcodone-acetaminophen (NORCO)  mg per tablet Take 1 tablet by mouth every 8 (eight) hours as needed (severe post operative pain). (Patient not taking: Reported on 11/19/2019) 44 tablet 0    medroxyPROGESTERone (PROVERA) 10 MG tablet TAKE ONE TABLET BY MOUTH EVERY DAY FOR 10 DAYS ON DAYS 15-24 of menses.  2    nabumetone (RELAFEN) 750 MG tablet Take 750 mg by mouth 2 (two) times daily.  0     oxyCODONE-acetaminophen (PERCOCET) 5-325 mg per tablet Take 1 tablet by mouth every 8 (eight) hours as needed (severe post opertative pain). (Patient not taking: Reported on 2019) 44 tablet 0    risperiDONE (RISPERDAL) 2 MG tablet Take 2 mg by mouth once daily.  0    silver sulfADIAZINE 1% (SILVADENE) 1 % cream APPLY TO THE AFFECTED AREA(S) TWICE DAILY  3     Current Facility-Administered Medications on File Prior to Visit   Medication Dose Route Frequency Provider Last Rate Last Dose    sodium chloride 0.9% flush 3 mL  3 mL Intravenous Q6H PRN Willard Candelario MD         Review of patient's allergies indicates:   Allergen Reactions    Keppra [levetiracetam]      Makes aggressive    Tramadol      Seizures    Xanax [alprazolam] Other (See Comments)     Makes aggressive     History reviewed. No pertinent family history.  Social History     Socioeconomic History    Marital status:      Spouse name: Not on file    Number of children: Not on file    Years of education: Not on file    Highest education level: Not on file   Occupational History    Not on file   Social Needs    Financial resource strain: Not on file    Food insecurity:     Worry: Not on file     Inability: Not on file    Transportation needs:     Medical: Not on file     Non-medical: Not on file   Tobacco Use    Smoking status: Former Smoker     Packs/day: 0.50     Years: 3.00     Pack years: 1.50     Types: Cigarettes     Last attempt to quit:      Years since quittin.8    Smokeless tobacco: Never Used   Substance and Sexual Activity    Alcohol use: Yes     Comment: One glass of wine once a month    Drug use: No    Sexual activity: Yes     Partners: Male     Birth control/protection: None   Lifestyle    Physical activity:     Days per week: Not on file     Minutes per session: Not on file    Stress: Not on file   Relationships    Social connections:     Talks on phone: Not on file     Gets together: Not on file      Attends Yazdanism service: Not on file     Active member of club or organization: Not on file     Attends meetings of clubs or organizations: Not on file     Relationship status: Not on file   Other Topics Concern    Not on file   Social History Narrative    Not on file       Review of Systems:  Constitutional:  Denies fever or chills   Eyes:  Denies change in visual acuity   HENT:  Denies nasal congestion or sore throat   Respiratory:  Denies cough or shortness of breath   Cardiovascular:  Denies chest pain or edema   GI:  Denies abdominal pain, nausea, vomiting, bloody stools or diarrhea   :  Denies dysuria   Integument:  Denies rash   Neurologic:  Denies headache, focal weakness or sensory changes   Endocrine:  Denies polyuria or polydipsia   Lymphatic:  Denies swollen glands   Psychiatric:  Denies depression or anxiety     Physical Exam:   Constitutional:  Well developed, well nourished, no acute distress, non-toxic appearance   Integument:  Well hydrated, no rash   Lymphatic:  No lymphadenopathy noted   Neurologic:  Alert & oriented x 3   Psychiatric:  Speech and behavior appropriate   Gi: abdomen soft  Eyes: EOMI    Right forearm  Exam performed same as contralateral side and is normal      Left forearm  Incision healed. No erythema or fluctuance. Overall normal alignment. Mild point TTP about the fracture site. Decreased ROM due to stiffness. Compartments soft. Skin intact. NVI distally.     X-rays were performed today, personally reviewed by me and findings discussed with the patient.  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  Desensitization discussed.  RTC 1 month.

## 2019-11-21 ENCOUNTER — PATIENT MESSAGE (OUTPATIENT)
Dept: ORTHOPEDICS | Facility: CLINIC | Age: 34
End: 2019-11-21

## 2019-11-21 NOTE — TELEPHONE ENCOUNTER
Pt sent the following [facility] Message:    Please update my record with the following information.  Test or Procedure:cbc, bmp  Date Completed:september 2019  Place of Service: [facility]  Include any additional comments:    You may attach a copy of the test or result below.

## 2019-12-02 RX ORDER — GABAPENTIN 300 MG/1
300 CAPSULE ORAL 3 TIMES DAILY
Qty: 90 CAPSULE | Refills: 0 | Status: SHIPPED | OUTPATIENT
Start: 2019-12-02 | End: 2019-12-19

## 2019-12-16 ENCOUNTER — OFFICE VISIT (OUTPATIENT)
Dept: PAIN MEDICINE | Facility: CLINIC | Age: 34
End: 2019-12-16
Payer: COMMERCIAL

## 2019-12-16 VITALS
TEMPERATURE: 99 F | WEIGHT: 245.5 LBS | HEART RATE: 64 BPM | BODY MASS INDEX: 45.18 KG/M2 | DIASTOLIC BLOOD PRESSURE: 58 MMHG | HEIGHT: 62 IN | SYSTOLIC BLOOD PRESSURE: 120 MMHG

## 2019-12-16 DIAGNOSIS — G56.42 COMPLEX REGIONAL PAIN SYNDROME TYPE 2 OF LEFT UPPER EXTREMITY: Primary | ICD-10-CM

## 2019-12-16 PROCEDURE — 3008F BODY MASS INDEX DOCD: CPT | Mod: CPTII,S$GLB,, | Performed by: ANESTHESIOLOGY

## 2019-12-16 PROCEDURE — 99999 PR PBB SHADOW E&M-EST. PATIENT-LVL IV: ICD-10-PCS | Mod: PBBFAC,,, | Performed by: ANESTHESIOLOGY

## 2019-12-16 PROCEDURE — 99213 OFFICE O/P EST LOW 20 MIN: CPT | Mod: S$GLB,,, | Performed by: ANESTHESIOLOGY

## 2019-12-16 PROCEDURE — 3008F PR BODY MASS INDEX (BMI) DOCUMENTED: ICD-10-PCS | Mod: CPTII,S$GLB,, | Performed by: ANESTHESIOLOGY

## 2019-12-16 PROCEDURE — 99999 PR PBB SHADOW E&M-EST. PATIENT-LVL IV: CPT | Mod: PBBFAC,,, | Performed by: ANESTHESIOLOGY

## 2019-12-16 PROCEDURE — 99213 PR OFFICE/OUTPT VISIT, EST, LEVL III, 20-29 MIN: ICD-10-PCS | Mod: S$GLB,,, | Performed by: ANESTHESIOLOGY

## 2019-12-16 RX ORDER — SODIUM CHLORIDE, SODIUM LACTATE, POTASSIUM CHLORIDE, CALCIUM CHLORIDE 600; 310; 30; 20 MG/100ML; MG/100ML; MG/100ML; MG/100ML
INJECTION, SOLUTION INTRAVENOUS CONTINUOUS
Status: CANCELLED | OUTPATIENT
Start: 2019-12-24
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
## 2019-12-18 ENCOUNTER — TELEPHONE (OUTPATIENT)
Dept: PAIN MEDICINE | Facility: CLINIC | Age: 34
End: 2019-12-18

## 2019-12-18 ENCOUNTER — PATIENT MESSAGE (OUTPATIENT)
Dept: SURGERY | Facility: HOSPITAL | Age: 34
End: 2019-12-18

## 2019-12-18 NOTE — TELEPHONE ENCOUNTER
----- Message from Emilee Lopez sent at 12/18/2019  2:30 PM CST -----  Good afternoon,     For this pt's procedure on 12/24, which side of the body will the procedure be for ?

## 2019-12-19 ENCOUNTER — OFFICE VISIT (OUTPATIENT)
Dept: ORTHOPEDICS | Facility: CLINIC | Age: 34
End: 2019-12-19
Payer: COMMERCIAL

## 2019-12-19 VITALS
DIASTOLIC BLOOD PRESSURE: 78 MMHG | HEART RATE: 66 BPM | SYSTOLIC BLOOD PRESSURE: 120 MMHG | WEIGHT: 245.5 LBS | HEIGHT: 62 IN | BODY MASS INDEX: 45.18 KG/M2

## 2019-12-19 DIAGNOSIS — S52.322K: Primary | ICD-10-CM

## 2019-12-19 PROCEDURE — 99214 PR OFFICE/OUTPT VISIT, EST, LEVL IV, 30-39 MIN: ICD-10-PCS | Mod: S$GLB,,, | Performed by: ORTHOPAEDIC SURGERY

## 2019-12-19 PROCEDURE — 3008F BODY MASS INDEX DOCD: CPT | Mod: CPTII,S$GLB,, | Performed by: ORTHOPAEDIC SURGERY

## 2019-12-19 PROCEDURE — 3008F PR BODY MASS INDEX (BMI) DOCUMENTED: ICD-10-PCS | Mod: CPTII,S$GLB,, | Performed by: ORTHOPAEDIC SURGERY

## 2019-12-19 PROCEDURE — 99999 PR PBB SHADOW E&M-EST. PATIENT-LVL III: CPT | Mod: PBBFAC,,, | Performed by: ORTHOPAEDIC SURGERY

## 2019-12-19 PROCEDURE — 99999 PR PBB SHADOW E&M-EST. PATIENT-LVL III: ICD-10-PCS | Mod: PBBFAC,,, | Performed by: ORTHOPAEDIC SURGERY

## 2019-12-19 PROCEDURE — 99214 OFFICE O/P EST MOD 30 MIN: CPT | Mod: S$GLB,,, | Performed by: ORTHOPAEDIC SURGERY

## 2019-12-19 RX ORDER — GABAPENTIN 300 MG/1
600 CAPSULE ORAL 3 TIMES DAILY
Qty: 180 CAPSULE | Refills: 0 | Status: CANCELLED | OUTPATIENT
Start: 2019-12-19 | End: 2020-01-18

## 2019-12-19 RX ORDER — SUMATRIPTAN 5 MG/1
SPRAY NASAL
COMMUNITY
Start: 2019-12-10 | End: 2020-07-07

## 2019-12-19 RX ORDER — GABAPENTIN 600 MG/1
600 TABLET ORAL 3 TIMES DAILY
Qty: 90 TABLET | Refills: 2 | Status: SHIPPED | OUTPATIENT
Start: 2019-12-19 | End: 2020-02-27 | Stop reason: SDUPTHER

## 2019-12-19 NOTE — PROGRESS NOTES
Chief Complaint   Patient presents with    Left Forearm - Pain, Post-op Evaluation       HPI:   This is a 34 y.o. who returns today status post left radius revision ORIF 4 months ago. Pain is moderate. Patient is compliant most of the time with restrictions.  Patient has been WBAT.  Pain is aching. No numbness or tingling. No associated signs or symptoms.        Past Medical History:   Diagnosis Date    Anxiety     Depression     Diabetes mellitus     Environmental allergies     Hyperlipidemia     Hypertension     Seizures     last reported 2019    Thyroid disease      Past Surgical History:   Procedure Laterality Date    ABDOMINAL SURGERY          OPEN REDUCTION AND INTERNAL FIXATION (ORIF) OF FRACTURE OF RADIUS Left 2019    Procedure: ORIF, FRACTURE, RADIAL SHAFT;  Surgeon: Ravi Panchal MD;  Location: [facility] OR;  Service: Orthopedics;  Laterality: Left;    OPEN REDUCTION AND INTERNAL FIXATION (ORIF) OF FRACTURE OF RADIUS Left 2019    Procedure: ORIF, FRACTURE, RADIUS;  Surgeon: Willard Candelario MD;  Location: [facility] OR;  Service: Orthopedics;  Laterality: Left;   REVISION ORIF RADIUS  General with block    OPEN REDUCTION AND INTERNAL FIXATION (ORIF) OF FRACTURE OF RADIUS Left 2019    Procedure: Revision ORIF, FRACTURE, RADIAL SHAFT;  Surgeon: Willard Candelario MD;  Location: [facility] OR;  Service: Orthopedics;  Laterality: Left;     Current Outpatient Medications on File Prior to Visit   Medication Sig Dispense Refill    atorvastatin (LIPITOR) 20 MG tablet TAKE ONE TABLET BY MOUTH AT BEDTIME; Instr DOSE increased TO 20mg, discontinue 10mg  3    busPIRone (BUSPAR) 15 MG tablet Take 15 mg by mouth 3 (three) times daily.  0    ergocalciferol (ERGOCALCIFEROL) 50,000 unit Cap Take 50,000 Units by mouth every 7 days.  0    escitalopram oxalate (LEXAPRO) 20 MG tablet Take 20 mg by mouth once daily.      hydrocortisone 2.5 % cream APPLY ONE GRAM TO THE AFFECTED AREA(S) TWICE DAILY   3    ibuprofen (ADVIL,MOTRIN) 800 MG tablet       JARDIANCE 10 mg Tab TAKE ONE TABLET BY MOUTH EVERY DAY IN THE MORNING STOP TRADJENTA  1    ketoconazole (NIZORAL) 2 % cream APPLY ONE GRAM TO AFFECTED AREA(S) TWICE DAILY FOR 5-7 DAYS  3    ketoconazole (NIZORAL) 2 % shampoo APPLY ONE APPLICATION TO SCALP IN SHOWER 2-3 TIMES WEEKLY  3    levothyroxine (SYNTHROID) 50 MCG tablet Take 50 mcg by mouth once daily.  2    metoprolol tartrate (LOPRESSOR) 50 MG tablet Take 50 mg by mouth 2 (two) times daily.      montelukast (SINGULAIR) 10 mg tablet Take 10 mg by mouth every evening.  2    OXcarbazepine (TRILEPTAL) 600 MG Tab Take 150 mg by mouth 2 (two) times daily.      SLOW RELEASE IRON 160 mg (50 mg iron) TbSR take THREE tabs BY MOUTH DAILY  0    spironolactone (ALDACTONE) 25 MG tablet Take 25 mg by mouth 2 (two) times daily.      SUMAtriptan (IMITREX) 5 mg/actuation Spry       TRUE METRIX GLUCOSE METER Misc USE AS DIRECTED TO check blood glucose FIVE TIMES DAILY  0    TRUE METRIX GLUCOSE TEST STRIP Strp       TRUEPLUS LANCETS 30 gauge Misc USE TO CHECK BLOOD SUGAR FIVE TIMES DAILY  5    gabapentin (NEURONTIN) 600 MG tablet Take 1 tablet (600 mg total) by mouth 3 (three) times daily. 90 tablet 2     Current Facility-Administered Medications on File Prior to Visit   Medication Dose Route Frequency Provider Last Rate Last Dose    sodium chloride 0.9% flush 3 mL  3 mL Intravenous Q6H PRN Willard Candelario MD         Review of patient's allergies indicates:   Allergen Reactions    Keppra [levetiracetam]      Makes aggressive    Tramadol      Seizures    Xanax [alprazolam] Other (See Comments)     Makes aggressive     History reviewed. No pertinent family history.  Social History     Socioeconomic History    Marital status:      Spouse name: Not on file    Number of children: Not on file    Years of education: Not on file    Highest education level: Not on file   Occupational History    Not on  file   Social Needs    Financial resource strain: Not on file    Food insecurity:     Worry: Not on file     Inability: Not on file    Transportation needs:     Medical: Not on file     Non-medical: Not on file   Tobacco Use    Smoking status: Former Smoker     Packs/day: 0.50     Years: 3.00     Pack years: 1.50     Types: Cigarettes     Last attempt to quit:      Years since quittin.9    Smokeless tobacco: Never Used   Substance and Sexual Activity    Alcohol use: Yes     Comment: One glass of wine once a month    Drug use: No    Sexual activity: Yes     Partners: Male     Birth control/protection: None   Lifestyle    Physical activity:     Days per week: Not on file     Minutes per session: Not on file    Stress: Not on file   Relationships    Social connections:     Talks on phone: Not on file     Gets together: Not on file     Attends Hindu service: Not on file     Active member of club or organization: Not on file     Attends meetings of clubs or organizations: Not on file     Relationship status: Not on file   Other Topics Concern    Not on file   Social History Narrative    Not on file       Review of Systems:  Constitutional:  Denies fever or chills   Eyes:  Denies change in visual acuity   HENT:  Denies nasal congestion or sore throat   Respiratory:  Denies cough or shortness of breath   Cardiovascular:  Denies chest pain or edema   GI:  Denies abdominal pain, nausea, vomiting, bloody stools or diarrhea   :  Denies dysuria   Integument:  Denies rash   Neurologic:  Denies headache, focal weakness or sensory changes   Endocrine:  Denies polyuria or polydipsia   Lymphatic:  Denies swollen glands   Psychiatric:  Denies depression or anxiety     Physical Exam:   Constitutional:  Well developed, well nourished, no acute distress, non-toxic appearance   Integument:  Well hydrated, no rash   Lymphatic:  No lymphadenopathy noted   Neurologic:  Alert & oriented x 3  Psychiatric:  Speech  and behavior appropriate   Gi: abdomen soft  Eyes: EOMI  Right forearm  Exam performed same as contralateral side and is normal        Left forearm  Incision healed. No erythema or fluctuance. Overall normal alignment. Mild point TTP about the fracture site. Decreased ROM due to stiffness. Compartments soft. Skin intact. NVI distally.              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

## 2019-12-24 ENCOUNTER — HOSPITAL ENCOUNTER (OUTPATIENT)
Facility: HOSPITAL | Age: 34
Discharge: HOME | End: 2019-12-24
Attending: ANESTHESIOLOGY | Admitting: ANESTHESIOLOGY
Payer: COMMERCIAL

## 2019-12-24 ENCOUNTER — HOSPITAL ENCOUNTER (OUTPATIENT)
Dept: RADIOLOGY | Facility: HOSPITAL | Age: 34
Discharge: HOME | End: 2019-12-24
Attending: ANESTHESIOLOGY | Admitting: ANESTHESIOLOGY
Payer: COMMERCIAL

## 2019-12-24 VITALS
OXYGEN SATURATION: 96 % | TEMPERATURE: 97 F | DIASTOLIC BLOOD PRESSURE: 58 MMHG | SYSTOLIC BLOOD PRESSURE: 111 MMHG | HEART RATE: 72 BPM | HEIGHT: 62 IN | WEIGHT: 245 LBS | BODY MASS INDEX: 45.08 KG/M2 | RESPIRATION RATE: 16 BRPM

## 2019-12-24 DIAGNOSIS — G56.42 COMPLEX REGIONAL PAIN SYNDROME TYPE 2 OF LEFT UPPER EXTREMITY: Primary | ICD-10-CM

## 2019-12-24 DIAGNOSIS — G56.42 COMPLEX REGIONAL PAIN SYNDROME TYPE 2 OF LEFT UPPER EXTREMITY: ICD-10-CM

## 2019-12-24 LAB
B-HCG UR QL: NEGATIVE
CTP QC/QA: YES
GLUCOSE SERPL-MCNC: 125 MG/DL (ref 70–110)

## 2019-12-24 PROCEDURE — 64510 N BLOCK STELLATE GANGLION: CPT | Mod: PO | Performed by: ANESTHESIOLOGY

## 2019-12-24 PROCEDURE — 99152 PR MOD CONSCIOUS SEDATION, SAME PHYS, 5+ YRS, FIRST 15 MIN: ICD-10-PCS | Mod: ,,, | Performed by: ANESTHESIOLOGY

## 2019-12-24 PROCEDURE — 64510 N BLOCK STELLATE GANGLION: CPT | Mod: LT,,, | Performed by: ANESTHESIOLOGY

## 2019-12-24 PROCEDURE — 81025 URINE PREGNANCY TEST: CPT | Mod: PO | Performed by: ANESTHESIOLOGY

## 2019-12-24 PROCEDURE — 76000 FLUOROSCOPY <1 HR PHYS/QHP: CPT | Mod: TC,PO

## 2019-12-24 PROCEDURE — 25000003 PHARM REV CODE 250: Mod: PO | Performed by: ANESTHESIOLOGY

## 2019-12-24 PROCEDURE — 25500020 PHARM REV CODE 255: Mod: PO | Performed by: ANESTHESIOLOGY

## 2019-12-24 PROCEDURE — 64510 PR INJECT NERV BLCK,STELLATE GANGLION: ICD-10-PCS | Mod: LT,,, | Performed by: ANESTHESIOLOGY

## 2019-12-24 PROCEDURE — 63600175 PHARM REV CODE 636 W HCPCS: Mod: PO | Performed by: ANESTHESIOLOGY

## 2019-12-24 PROCEDURE — 99152 MOD SED SAME PHYS/QHP 5/>YRS: CPT | Mod: ,,, | Performed by: ANESTHESIOLOGY

## 2019-12-24 RX ORDER — BUPIVACAINE HYDROCHLORIDE 2.5 MG/ML
INJECTION, SOLUTION EPIDURAL; INFILTRATION; INTRACAUDAL
Status: DISCONTINUED | OUTPATIENT
Start: 2019-12-24 | End: 2019-12-24 | Stop reason: HOSPADM

## 2019-12-24 RX ORDER — LIDOCAINE HYDROCHLORIDE 10 MG/ML
INJECTION, SOLUTION EPIDURAL; INFILTRATION; INTRACAUDAL; PERINEURAL
Status: DISCONTINUED | OUTPATIENT
Start: 2019-12-24 | End: 2019-12-24 | Stop reason: HOSPADM

## 2019-12-24 RX ORDER — LIDOCAINE HYDROCHLORIDE AND EPINEPHRINE 10; 10 MG/ML; UG/ML
INJECTION, SOLUTION INFILTRATION; PERINEURAL
Status: DISCONTINUED | OUTPATIENT
Start: 2019-12-24 | End: 2019-12-24 | Stop reason: HOSPADM

## 2019-12-24 RX ORDER — MIDAZOLAM HYDROCHLORIDE 2 MG/2ML
INJECTION, SOLUTION INTRAMUSCULAR; INTRAVENOUS
Status: DISCONTINUED | OUTPATIENT
Start: 2019-12-24 | End: 2019-12-24 | Stop reason: HOSPADM

## 2019-12-24 RX ORDER — SODIUM CHLORIDE, SODIUM LACTATE, POTASSIUM CHLORIDE, CALCIUM CHLORIDE 600; 310; 30; 20 MG/100ML; MG/100ML; MG/100ML; MG/100ML
INJECTION, SOLUTION INTRAVENOUS CONTINUOUS
Status: DISCONTINUED | OUTPATIENT
Start: 2019-12-24 | End: 2019-12-24 | Stop reason: HOSPADM

## 2019-12-24 RX ADMIN — SODIUM CHLORIDE, SODIUM LACTATE, POTASSIUM CHLORIDE, AND CALCIUM CHLORIDE: .6; .31; .03; .02 INJECTION, SOLUTION INTRAVENOUS at 10:12

## 2019-12-24 NOTE — DISCHARGE INSTRUCTIONS
PAIN MANAGEMENT    Home care instructions   Apply ice pack to the injection site for 20 minute prior for the first 24 hours for soreness/discomfort at injection site   DO NOT USE HEAT FOR 24 HOURS   Keep site clean and dry for 24 hours, remove bandaid when desired   Do not drive until tomorrow  Take care when walking after a lumbar injection     BLOCKS  Resume regular activities today  Pain office will call in next 2 days  Resume Aspirin, Plavix, or Coumadin the day after the procedure unless other wise instructed  Resume home medication as prescribed today    CALL PHYSICIAN FOR:   Severe increase in your usual pain or appearance of new pain   Prolonged or increasing weakness or numbness in the legs or arms   Fever greater then 100 degrees F..   Drainage from the incision site, redness, active bleeding or increased swelling at the injection site   Headache that increases when your head is upright and decreases when you lie flat    FOR EMERGENCIES:   Go directly to Emergency Department for Shortness of breath, chest pain, or problems breathing      Recovery After Procedural Sedation (Adult)  You have been given medicine by vein to make you sleep during your surgery. This may have included both a pain medicine and sleeping medicine. Most of the effects have worn off. But you may still have some drowsiness for the next 6 to 8 hours.  Home care  Follow these guidelines when you get home:  · For the next 8 hours, you should be watched by a responsible adult. This person should make sure your condition is not getting worse.  · Don't drink any alcohol for the next 24 hours.  · Don't drive, operate dangerous machinery, or make important business or personal decisions during the next 24 hours.  Note: Your healthcare provider may tell you not to take any medicine by mouth for pain or sleep in the next 4 hours. These medicines may react with the medicines you were given in the hospital. This could cause a much  stronger response than usual.  Follow-up care  Follow up with your healthcare provider if you are not alert and back to your usual level of activity within 12 hours.  When to seek medical advice  Call your healthcare provider right away if any of these occur:  · Drowsiness gets worse  · Weakness or dizziness gets worse  · Repeated vomiting  · You can't be awakened   Date Last Reviewed: 10/18/2016  © 8395-0981 [employer]. [address] [place] [address], [place] [address]. All rights reserved. This information is not intended as a substitute for professional medical care. Always follow your healthcare professional's instructions.

## 2019-12-24 NOTE — OP NOTE
PROCEDURE DATE: 12/24/2019    PROCEDURE: left side stellate ganglion block utilizing fluoroscopy    DIAGNOSIS: CRPS  Post Op diagnosis: Same    PHYSICIAN: Tree Tapia MD    MEDICATIONS INJECTED:    1) Test dose:  Lidocaine 1% with 1:200,000 epinephrine, 4 ml total  2) Treatment dose: Bupivicane 0.25%, 6ml total    LOCAL ANESTHETIC INJECTED:  Lidocaine 1%. ??ml per site.    SEDATION MEDICATIONS: versed 2mg    ESTIMATED BLOOD LOSS:  none    COMPLICATIONS:  none    TECHNIQUE:   A time-out taken to identify patient and procedure side prior to starting the procedure. The patient was placed in supine position, prepped and draped in the usual sterile fashion using ChloraPrep and sterile towels.  The area to be injected was determined under fluoroscopic guidance in AP and oblique view. The fluoroscope was rotated to a left-side oblique view at which point the uncinate process of the C6 vertebra was identified. Carotid artery was palpated and insured not in path of needle placement. Local anesthetic was given by raising a wheel and going down to the hub of a 25-gauge 1.5 inch needle.  In oblique view, a 3.5 inch 25-gauge bent-tip spinal needle was introduced and followed until it made contact with the uncinate process just anterior to the C6 foramen. After negative aspiration for blood or air, contrast dye was injected to confirm appropriate placement and that there was no vascular uptake, and this was also performed under live digital subtraction.  The test dose as noted above was given over 4 minutes and there were no signs of HR or BP changes, no tinnitus or circumoral numbness. Again, aspiration was negative for blood or air and the treatment medication was then given slowly over 5 minutes. The patient tolerated the procedure well. The patient was monitored after the procedure.  The patient was given post procedure and discharge instructions to follow at home. The patient was discharged in a stable condition.

## 2019-12-24 NOTE — INTERVAL H&P NOTE
The patient has been examined and the H&P has been reviewed:    I concur with the findings and no changes have occurred since H&P was written.  ASA 2, MP II    Anesthesia/Surgery risks, benefits and alternative options discussed and understood by patient/family.    Active Hospital Problems    Diagnosis  POA    Complex regional pain syndrome type 2 of left upper extremity [G56.42]  Yes      Resolved Hospital Problems   No resolved problems to display.

## 2019-12-24 NOTE — DISCHARGE SUMMARY
[facility]  Discharge Note  Short Stay    Admit Date: 12/24/2019    Discharge Date: 12/24/2019    Attending Physician: Tree Tapia     Discharge Provider: Tree Tapia    Diagnoses:  Active Hospital Problems    Diagnosis  POA    Complex regional pain syndrome type 2 of left upper extremity [G56.42]  Yes      Resolved Hospital Problems   No resolved problems to display.       Discharged Condition: Good    Final Diagnoses: Complex regional pain syndrome type 2 of left upper extremity [G56.42]    Disposition: Home or Self Care    Hospital Course: No complications, uneventful    Outcome of Hospitalization, Treatment, Procedure, or Surgery:  Patient was admitted for outpatient interventional pain management procedure. The patient tolerated the procedure well with no complications.    Follow up/Patient Instructions:  Follow up as scheduled in Pain Management office in 3-4 weeks.  Patient has received instructions and follow up date and time.    Medications:  Continue previous medications    Discharge Procedure Orders   Notify your health care provider if you experience any of the following:  temperature >100.4     Notify your health care provider if you experience any of the following:  persistent nausea and vomiting or diarrhea     Notify your health care provider if you experience any of the following:  severe uncontrolled pain     Notify your health care provider if you experience any of the following:  redness, tenderness, or signs of infection (pain, swelling, redness, odor or green/yellow discharge around incision site)     Notify your health care provider if you experience any of the following:  difficulty breathing or increased cough     Notify your health care provider if you experience any of the following:  severe persistent headache     Notify your health care provider if you experience any of the following:  worsening rash     Notify your health care provider if you experience any of the  following:  persistent dizziness, light-headedness, or visual disturbances     Notify your health care provider if you experience any of the following:  increased confusion or weakness     Activity as tolerated         Discharge Procedure Orders (must include Diet, Follow-up, Activity):   Discharge Procedure Orders (must include Diet, Follow-up, Activity)   Notify your health care provider if you experience any of the following:  temperature >100.4     Notify your health care provider if you experience any of the following:  persistent nausea and vomiting or diarrhea     Notify your health care provider if you experience any of the following:  severe uncontrolled pain     Notify your health care provider if you experience any of the following:  redness, tenderness, or signs of infection (pain, swelling, redness, odor or green/yellow discharge around incision site)     Notify your health care provider if you experience any of the following:  difficulty breathing or increased cough     Notify your health care provider if you experience any of the following:  severe persistent headache     Notify your health care provider if you experience any of the following:  worsening rash     Notify your health care provider if you experience any of the following:  persistent dizziness, light-headedness, or visual disturbances     Notify your health care provider if you experience any of the following:  increased confusion or weakness     Activity as tolerated

## 2020-01-10 ENCOUNTER — PATIENT MESSAGE (OUTPATIENT)
Dept: SURGERY | Facility: HOSPITAL | Age: 35
End: 2020-01-10

## 2020-01-10 ENCOUNTER — OFFICE VISIT (OUTPATIENT)
Dept: PAIN MEDICINE | Facility: CLINIC | Age: 35
End: 2020-01-10
Payer: COMMERCIAL

## 2020-01-10 VITALS
RESPIRATION RATE: 18 BRPM | OXYGEN SATURATION: 97 % | SYSTOLIC BLOOD PRESSURE: 102 MMHG | BODY MASS INDEX: 46.05 KG/M2 | DIASTOLIC BLOOD PRESSURE: 62 MMHG | TEMPERATURE: 97 F | WEIGHT: 251.75 LBS | HEART RATE: 62 BPM

## 2020-01-10 DIAGNOSIS — G56.42 COMPLEX REGIONAL PAIN SYNDROME TYPE 2 OF LEFT UPPER EXTREMITY: Primary | ICD-10-CM

## 2020-01-10 PROCEDURE — 3008F PR BODY MASS INDEX (BMI) DOCUMENTED: ICD-10-PCS | Mod: CPTII,S$GLB,, | Performed by: ANESTHESIOLOGY

## 2020-01-10 PROCEDURE — 99999 PR PBB SHADOW E&M-EST. PATIENT-LVL IV: ICD-10-PCS | Mod: PBBFAC,,, | Performed by: ANESTHESIOLOGY

## 2020-01-10 PROCEDURE — 3008F BODY MASS INDEX DOCD: CPT | Mod: CPTII,S$GLB,, | Performed by: ANESTHESIOLOGY

## 2020-01-10 PROCEDURE — 99999 PR PBB SHADOW E&M-EST. PATIENT-LVL IV: CPT | Mod: PBBFAC,,, | Performed by: ANESTHESIOLOGY

## 2020-01-10 PROCEDURE — 99213 OFFICE O/P EST LOW 20 MIN: CPT | Mod: S$GLB,,, | Performed by: ANESTHESIOLOGY

## 2020-01-10 PROCEDURE — 99213 PR OFFICE/OUTPT VISIT, EST, LEVL III, 20-29 MIN: ICD-10-PCS | Mod: S$GLB,,, | Performed by: ANESTHESIOLOGY
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
## 2020-01-14 NOTE — TELEPHONE ENCOUNTER
----- Message from Ranjan Kirk sent at 1/14/2020 12:42 PM CST -----  Type: Needs Medical Advice    Who Called: self   Symptoms (please be specific):  NA  How long has patient had these symptoms:   Pharmacy name and phone #:  [facility]  Best Call Back Number: [phone]  Additional Information: Patient requesting to cancel procedure.

## 2020-01-29 DIAGNOSIS — S52.322K: Primary | ICD-10-CM

## 2020-01-29 DIAGNOSIS — Z87.81 S/P ORIF (OPEN REDUCTION INTERNAL FIXATION) FRACTURE: ICD-10-CM

## 2020-01-29 DIAGNOSIS — Z98.890 S/P ORIF (OPEN REDUCTION INTERNAL FIXATION) FRACTURE: ICD-10-CM

## 2020-01-30 ENCOUNTER — OFFICE VISIT (OUTPATIENT)
Dept: ORTHOPEDICS | Facility: CLINIC | Age: 35
End: 2020-01-30
Payer: COMMERCIAL

## 2020-01-30 ENCOUNTER — HOSPITAL ENCOUNTER (OUTPATIENT)
Dept: RADIOLOGY | Facility: HOSPITAL | Age: 35
Discharge: HOME | End: 2020-01-30
Attending: ORTHOPAEDIC SURGERY
Payer: COMMERCIAL

## 2020-01-30 VITALS
BODY MASS INDEX: 46.19 KG/M2 | HEIGHT: 62 IN | WEIGHT: 251 LBS | SYSTOLIC BLOOD PRESSURE: 121 MMHG | HEART RATE: 82 BPM | DIASTOLIC BLOOD PRESSURE: 82 MMHG

## 2020-01-30 DIAGNOSIS — S52.322K: ICD-10-CM

## 2020-01-30 DIAGNOSIS — Z87.81 S/P ORIF (OPEN REDUCTION INTERNAL FIXATION) FRACTURE: ICD-10-CM

## 2020-01-30 DIAGNOSIS — S52.322K: Primary | ICD-10-CM

## 2020-01-30 DIAGNOSIS — Z98.890 S/P ORIF (OPEN REDUCTION INTERNAL FIXATION) FRACTURE: ICD-10-CM

## 2020-01-30 PROCEDURE — 3008F PR BODY MASS INDEX (BMI) DOCUMENTED: ICD-10-PCS | Mod: CPTII,S$GLB,, | Performed by: ORTHOPAEDIC SURGERY

## 2020-01-30 PROCEDURE — 73090 X-RAY EXAM OF FOREARM: CPT | Mod: TC,PO,LT

## 2020-01-30 PROCEDURE — 99999 PR PBB SHADOW E&M-EST. PATIENT-LVL III: ICD-10-PCS | Mod: PBBFAC,,, | Performed by: ORTHOPAEDIC SURGERY

## 2020-01-30 PROCEDURE — 73090 XR FOREARM LEFT: ICD-10-PCS | Mod: 26,LT,, | Performed by: RADIOLOGY

## 2020-01-30 PROCEDURE — 99999 PR PBB SHADOW E&M-EST. PATIENT-LVL III: CPT | Mod: PBBFAC,,, | Performed by: ORTHOPAEDIC SURGERY

## 2020-01-30 PROCEDURE — 3008F BODY MASS INDEX DOCD: CPT | Mod: CPTII,S$GLB,, | Performed by: ORTHOPAEDIC SURGERY

## 2020-01-30 PROCEDURE — 73090 X-RAY EXAM OF FOREARM: CPT | Mod: 26,LT,, | Performed by: RADIOLOGY

## 2020-01-30 PROCEDURE — 99214 OFFICE O/P EST MOD 30 MIN: CPT | Mod: S$GLB,,, | Performed by: ORTHOPAEDIC SURGERY

## 2020-01-30 PROCEDURE — 99214 PR OFFICE/OUTPT VISIT, EST, LEVL IV, 30-39 MIN: ICD-10-PCS | Mod: S$GLB,,, | Performed by: ORTHOPAEDIC SURGERY

## 2020-01-30 RX ORDER — RISPERIDONE 2 MG/1
2 TABLET ORAL NIGHTLY
COMMUNITY
Start: 2020-01-20 | End: 2020-05-14

## 2020-01-30 NOTE — PROGRESS NOTES
Chief Complaint   Patient presents with    Left Forearm - Post-op Evaluation, Pain       HPI:   This is a 34 y.o. who returns today status post left radius revision ORIF  5 months ago. Patient has been WBAT.  Pain is dull. No numbness or tingling. No associated signs or symptoms. She did not continue to use bone stimulator.     Past Medical History:   Diagnosis Date    Anxiety     Depression     Diabetes mellitus     Environmental allergies     Hyperlipidemia     Hypertension     Migraine     Seizures     last reported 2019    Thyroid disease      Past Surgical History:   Procedure Laterality Date    ABDOMINAL SURGERY          INJECTION OF ANESTHETIC AGENT AROUND NERVE Left 2019    Procedure: Block, Nerve STELLATE GANGLION;  Surgeon: Tree Tapia MD;  Location: [facility] OR;  Service: Pain Management;  Laterality: Left;    OPEN REDUCTION AND INTERNAL FIXATION (ORIF) OF FRACTURE OF RADIUS Left 2019    Procedure: ORIF, FRACTURE, RADIAL SHAFT;  Surgeon: Ravi Panchal MD;  Location: [facility] OR;  Service: Orthopedics;  Laterality: Left;    OPEN REDUCTION AND INTERNAL FIXATION (ORIF) OF FRACTURE OF RADIUS Left 2019    Procedure: ORIF, FRACTURE, RADIUS;  Surgeon: Willard Candelario MD;  Location: [facility] OR;  Service: Orthopedics;  Laterality: Left;   REVISION ORIF RADIUS  General with block    OPEN REDUCTION AND INTERNAL FIXATION (ORIF) OF FRACTURE OF RADIUS Left 2019    Procedure: Revision ORIF, FRACTURE, RADIAL SHAFT;  Surgeon: Willard Candelario MD;  Location: [facility] OR;  Service: Orthopedics;  Laterality: Left;     Current Outpatient Medications on File Prior to Visit   Medication Sig Dispense Refill    atorvastatin (LIPITOR) 20 MG tablet TAKE ONE TABLET BY MOUTH AT BEDTIME; Instr DOSE increased TO 20mg, discontinue 10mg  3    ergocalciferol (ERGOCALCIFEROL) 50,000 unit Cap Take 50,000 Units by mouth every 7 days.  0    escitalopram oxalate (LEXAPRO) 20 MG tablet Take 20 mg by  mouth once daily.      gabapentin (NEURONTIN) 600 MG tablet Take 1 tablet (600 mg total) by mouth 3 (three) times daily. 90 tablet 2    hydrocortisone 2.5 % cream APPLY ONE GRAM TO THE AFFECTED AREA(S) TWICE DAILY  3    ibuprofen (ADVIL,MOTRIN) 800 MG tablet       JARDIANCE 10 mg Tab TAKE ONE TABLET BY MOUTH EVERY DAY IN THE MORNING STOP TRADJENTA  1    ketoconazole (NIZORAL) 2 % cream APPLY ONE GRAM TO AFFECTED AREA(S) TWICE DAILY FOR 5-7 DAYS  3    ketoconazole (NIZORAL) 2 % shampoo APPLY ONE APPLICATION TO SCALP IN SHOWER 2-3 TIMES WEEKLY  3    levothyroxine (SYNTHROID) 50 MCG tablet Take 50 mcg by mouth once daily.  2    metoprolol tartrate (LOPRESSOR) 50 MG tablet Take 50 mg by mouth 2 (two) times daily.      montelukast (SINGULAIR) 10 mg tablet Take 10 mg by mouth every evening.  2    OXcarbazepine (TRILEPTAL) 600 MG Tab Take 150 mg by mouth 2 (two) times daily.      risperiDONE (RISPERDAL) 2 MG tablet Take 2 mg by mouth every evening.      SLOW RELEASE IRON 160 mg (50 mg iron) TbSR 160 mg once daily.   0    spironolactone (ALDACTONE) 25 MG tablet Take 25 mg by mouth 2 (two) times daily.      SUMAtriptan (IMITREX) 5 mg/actuation Spry       TRUE METRIX GLUCOSE METER Misc USE AS DIRECTED TO check blood glucose FIVE TIMES DAILY  0    TRUE METRIX GLUCOSE TEST STRIP Strp       TRUEPLUS LANCETS 30 gauge Misc USE TO CHECK BLOOD SUGAR FIVE TIMES DAILY  5    busPIRone (BUSPAR) 15 MG tablet Take 15 mg by mouth 3 (three) times daily.  0     Current Facility-Administered Medications on File Prior to Visit   Medication Dose Route Frequency Provider Last Rate Last Dose    sodium chloride 0.9% flush 3 mL  3 mL Intravenous Q6H PRN Willard Candelario MD         Review of patient's allergies indicates:   Allergen Reactions    Keppra [levetiracetam]      Makes aggressive    Tramadol      Seizures    Xanax [alprazolam] Other (See Comments)     Makes aggressive     History reviewed. No pertinent family  history.  Social History     Socioeconomic History    Marital status:      Spouse name: Not on file    Number of children: Not on file    Years of education: Not on file    Highest education level: Not on file   Occupational History    Not on file   Social Needs    Financial resource strain: Not on file    Food insecurity:     Worry: Not on file     Inability: Not on file    Transportation needs:     Medical: Not on file     Non-medical: Not on file   Tobacco Use    Smoking status: Former Smoker     Packs/day: 0.50     Years: 3.00     Pack years: 1.50     Types: Cigarettes     Last attempt to quit:      Years since quittin.0    Smokeless tobacco: Never Used   Substance and Sexual Activity    Alcohol use: Yes     Comment: One glass of wine once a month    Drug use: No    Sexual activity: Yes     Partners: Male     Birth control/protection: None   Lifestyle    Physical activity:     Days per week: Not on file     Minutes per session: Not on file    Stress: Not on file   Relationships    Social connections:     Talks on phone: Not on file     Gets together: Not on file     Attends Restoration service: Not on file     Active member of club or organization: Not on file     Attends meetings of clubs or organizations: Not on file     Relationship status: Not on file   Other Topics Concern    Not on file   Social History Narrative    Not on file       Review of Systems:  Constitutional:  Denies fever or chills   Eyes:  Denies change in visual acuity   HENT:  Denies nasal congestion or sore throat   Respiratory:  Denies cough or shortness of breath   Cardiovascular:  Denies chest pain or edema   GI:  Denies abdominal pain, nausea, vomiting, bloody stools or diarrhea   :  Denies dysuria   Integument:  Denies rash   Neurologic:  Denies headache, focal weakness or sensory changes   Endocrine:  Denies polyuria or polydipsia   Lymphatic:  Denies swollen glands   Psychiatric:  Denies depression or  anxiety     Physical Exam:   Constitutional:  Well developed, well nourished, no acute distress, non-toxic appearance   Integument:  Well hydrated, no rash   Lymphatic:  No lymphadenopathy noted   Neurologic:  Alert & oriented x 3  Psychiatric:  Speech and behavior appropriate   Gi: abdomen soft  Eyes: EOMI    Right forearm  Exam performed same as contralateral side and is normal        Left forearm  Incision healed. No erythema or fluctuance. Overall normal alignment. Mild point TTP about the fracture site. FROM elbow and wrist. Compartments soft. Skin intact. NVI distally.        X-rays were performed today, personally reviewed by me and findings discussed with the patient.  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

## 2020-02-27 RX ORDER — GABAPENTIN 600 MG/1
600 TABLET ORAL 3 TIMES DAILY
Qty: 90 TABLET | Refills: 2 | Status: SHIPPED | OUTPATIENT
Start: 2020-02-27 | End: 2020-06-30 | Stop reason: SDUPTHER

## 2020-04-15 ENCOUNTER — TELEPHONE (OUTPATIENT)
Dept: ORTHOPEDICS | Facility: CLINIC | Age: 35
End: 2020-04-15

## 2020-04-15 NOTE — TELEPHONE ENCOUNTER
Attempted to call patient regarding upcoming appointment with Dr. Candelario. Explained due to COVID-19, we are rescheduling all appointments unless they feel they are having an urgent or emergent issue. No answer. Left message on voicemail.

## 2020-04-27 ENCOUNTER — TELEPHONE (OUTPATIENT)
Dept: ORTHOPEDICS | Facility: CLINIC | Age: 35
End: 2020-04-27

## 2020-04-27 NOTE — TELEPHONE ENCOUNTER
Left message on voicemail for pt to call back. Need to reschedule appt on 4/30/2020 to the morning due to COVID-19 restrictions. Dr. Candelario is only in clinic Tuesday and Thursday mornings. Also sending [facility] message. Thanks, Lennie

## 2020-04-28 ENCOUNTER — PATIENT MESSAGE (OUTPATIENT)
Dept: ORTHOPEDICS | Facility: CLINIC | Age: 35
End: 2020-04-28

## 2020-05-08 DIAGNOSIS — Z87.81 S/P ORIF (OPEN REDUCTION INTERNAL FIXATION) FRACTURE: ICD-10-CM

## 2020-05-08 DIAGNOSIS — S52.322K: Primary | ICD-10-CM

## 2020-05-08 DIAGNOSIS — Z98.890 S/P ORIF (OPEN REDUCTION INTERNAL FIXATION) FRACTURE: ICD-10-CM

## 2020-05-14 ENCOUNTER — OFFICE VISIT (OUTPATIENT)
Dept: ORTHOPEDICS | Facility: CLINIC | Age: 35
End: 2020-05-14
Payer: COMMERCIAL

## 2020-05-14 ENCOUNTER — HOSPITAL ENCOUNTER (OUTPATIENT)
Dept: RADIOLOGY | Facility: HOSPITAL | Age: 35
Discharge: HOME | End: 2020-05-14
Attending: ORTHOPAEDIC SURGERY
Payer: COMMERCIAL

## 2020-05-14 VITALS
SYSTOLIC BLOOD PRESSURE: 148 MMHG | HEIGHT: 62 IN | TEMPERATURE: 98 F | DIASTOLIC BLOOD PRESSURE: 102 MMHG | HEART RATE: 75 BPM | WEIGHT: 251 LBS | BODY MASS INDEX: 46.19 KG/M2

## 2020-05-14 DIAGNOSIS — Z87.81 S/P ORIF (OPEN REDUCTION INTERNAL FIXATION) FRACTURE: ICD-10-CM

## 2020-05-14 DIAGNOSIS — S52.322K: Primary | ICD-10-CM

## 2020-05-14 DIAGNOSIS — S52.322K: ICD-10-CM

## 2020-05-14 DIAGNOSIS — Z98.890 S/P ORIF (OPEN REDUCTION INTERNAL FIXATION) FRACTURE: ICD-10-CM

## 2020-05-14 PROCEDURE — 99999 PR PBB SHADOW E&M-EST. PATIENT-LVL III: ICD-10-PCS | Mod: PBBFAC,,, | Performed by: ORTHOPAEDIC SURGERY

## 2020-05-14 PROCEDURE — 3008F BODY MASS INDEX DOCD: CPT | Mod: CPTII,S$GLB,, | Performed by: ORTHOPAEDIC SURGERY

## 2020-05-14 PROCEDURE — 99999 PR PBB SHADOW E&M-EST. PATIENT-LVL III: CPT | Mod: PBBFAC,,, | Performed by: ORTHOPAEDIC SURGERY

## 2020-05-14 PROCEDURE — 3008F PR BODY MASS INDEX (BMI) DOCUMENTED: ICD-10-PCS | Mod: CPTII,S$GLB,, | Performed by: ORTHOPAEDIC SURGERY

## 2020-05-14 PROCEDURE — 73090 X-RAY EXAM OF FOREARM: CPT | Mod: TC,PO,LT

## 2020-05-14 PROCEDURE — 99214 PR OFFICE/OUTPT VISIT, EST, LEVL IV, 30-39 MIN: ICD-10-PCS | Mod: S$GLB,,, | Performed by: ORTHOPAEDIC SURGERY

## 2020-05-14 PROCEDURE — 99214 OFFICE O/P EST MOD 30 MIN: CPT | Mod: S$GLB,,, | Performed by: ORTHOPAEDIC SURGERY

## 2020-05-14 PROCEDURE — 73090 X-RAY EXAM OF FOREARM: CPT | Mod: 26,LT,, | Performed by: RADIOLOGY

## 2020-05-14 PROCEDURE — 73090 XR FOREARM LEFT: ICD-10-PCS | Mod: 26,LT,, | Performed by: RADIOLOGY

## 2020-05-14 RX ORDER — RISPERIDONE 3 MG/1
TABLET ORAL
COMMUNITY
Start: 2020-03-30 | End: 2020-07-07

## 2020-05-14 RX ORDER — CARBAMAZEPINE 200 MG/1
TABLET ORAL
COMMUNITY
Start: 2020-03-04 | End: 2020-07-07

## 2020-05-14 RX ORDER — ESCITALOPRAM OXALATE 10 MG/1
TABLET ORAL
COMMUNITY
Start: 2020-04-04 | End: 2020-07-07

## 2020-05-14 RX ORDER — FLUTICASONE PROPIONATE 50 MCG
2 SPRAY, SUSPENSION (ML) NASAL
COMMUNITY
Start: 2020-02-09 | End: 2020-07-07

## 2020-05-14 RX ORDER — METHOCARBAMOL 750 MG/1
750 TABLET, FILM COATED ORAL 4 TIMES DAILY PRN
Qty: 44 TABLET | Refills: 0 | Status: SHIPPED | OUTPATIENT
Start: 2020-05-14 | End: 2020-08-25

## 2020-05-14 NOTE — PROGRESS NOTES
Chief Complaint   Patient presents with    Left Forearm - Post-op Evaluation     ORIF L FOREARM 19       HPI:   This is a 35 y.o. who returns today status post left radius revision ORIF 9 months ago. Patient has been WBAT.  Pain is dull. No numbness or tingling. No associated signs or symptoms. She did not continue to use bone stimulator.          Past Medical History:   Diagnosis Date    Anxiety     Depression     Diabetes mellitus     Environmental allergies     Hyperlipidemia     Hypertension     Migraine     Seizures     last reported 2019    Thyroid disease      Past Surgical History:   Procedure Laterality Date    ABDOMINAL SURGERY          INJECTION OF ANESTHETIC AGENT AROUND NERVE Left 2019    Procedure: Block, Nerve STELLATE GANGLION;  Surgeon: Tree Tapia MD;  Location: [facility] OR;  Service: Pain Management;  Laterality: Left;    OPEN REDUCTION AND INTERNAL FIXATION (ORIF) OF FRACTURE OF RADIUS Left 2019    Procedure: ORIF, FRACTURE, RADIAL SHAFT;  Surgeon: Ravi Panchal MD;  Location: [facility] OR;  Service: Orthopedics;  Laterality: Left;    OPEN REDUCTION AND INTERNAL FIXATION (ORIF) OF FRACTURE OF RADIUS Left 2019    Procedure: ORIF, FRACTURE, RADIUS;  Surgeon: Willard Candelario MD;  Location: [facility] OR;  Service: Orthopedics;  Laterality: Left;   REVISION ORIF RADIUS  General with block    OPEN REDUCTION AND INTERNAL FIXATION (ORIF) OF FRACTURE OF RADIUS Left 2019    Procedure: Revision ORIF, FRACTURE, RADIAL SHAFT;  Surgeon: Willard Candelario MD;  Location: [facility] OR;  Service: Orthopedics;  Laterality: Left;     Current Outpatient Medications on File Prior to Visit   Medication Sig Dispense Refill    atorvastatin (LIPITOR) 20 MG tablet TAKE ONE TABLET BY MOUTH AT BEDTIME; Instr DOSE increased TO 20mg, discontinue 10mg  3    carBAMazepine (TEGRETOL) 200 mg tablet       ergocalciferol (ERGOCALCIFEROL) 50,000 unit Cap Take 50,000 Units by mouth every 7  days.  0    escitalopram oxalate (LEXAPRO) 10 MG tablet       fluticasone propionate (FLONASE) 50 mcg/actuation nasal spray 2 sprays by Nasal route.      gabapentin (NEURONTIN) 600 MG tablet Take 1 tablet (600 mg total) by mouth 3 (three) times daily. 90 tablet 2    hydrocortisone 2.5 % cream APPLY ONE GRAM TO THE AFFECTED AREA(S) TWICE DAILY  3    JARDIANCE 10 mg Tab TAKE ONE TABLET BY MOUTH EVERY DAY IN THE MORNING STOP TRADJENTA  1    ketoconazole (NIZORAL) 2 % cream APPLY ONE GRAM TO AFFECTED AREA(S) TWICE DAILY FOR 5-7 DAYS  3    ketoconazole (NIZORAL) 2 % shampoo APPLY ONE APPLICATION TO SCALP IN SHOWER 2-3 TIMES WEEKLY  3    levothyroxine (SYNTHROID) 50 MCG tablet Take 50 mcg by mouth once daily.  2    metoprolol tartrate (LOPRESSOR) 50 MG tablet Take 50 mg by mouth 2 (two) times daily.      montelukast (SINGULAIR) 10 mg tablet Take 10 mg by mouth every evening.  2    OXcarbazepine (TRILEPTAL) 600 MG Tab Take 150 mg by mouth 2 (two) times daily.      risperiDONE (RISPERDAL) 3 MG Tab       SLOW RELEASE IRON 160 mg (50 mg iron) TbSR 160 mg once daily.   0    spironolactone (ALDACTONE) 25 MG tablet Take 25 mg by mouth 2 (two) times daily.      SUMAtriptan (IMITREX) 5 mg/actuation Spry       TRUE METRIX GLUCOSE METER Misc USE AS DIRECTED TO check blood glucose FIVE TIMES DAILY  0    TRUE METRIX GLUCOSE TEST STRIP Strp       TRUEPLUS LANCETS 30 gauge Misc USE TO CHECK BLOOD SUGAR FIVE TIMES DAILY  5    [DISCONTINUED] busPIRone (BUSPAR) 15 MG tablet Take 15 mg by mouth 3 (three) times daily.  0    [DISCONTINUED] ibuprofen (ADVIL,MOTRIN) 800 MG tablet       [DISCONTINUED] escitalopram oxalate (LEXAPRO) 20 MG tablet Take 20 mg by mouth once daily.      [DISCONTINUED] risperiDONE (RISPERDAL) 2 MG tablet Take 2 mg by mouth every evening.       Current Facility-Administered Medications on File Prior to Visit   Medication Dose Route Frequency Provider Last Rate Last Dose    sodium chloride 0.9%  flush 3 mL  3 mL Intravenous Q6H PRN Willard Candelario MD         Review of patient's allergies indicates:   Allergen Reactions    Keppra [levetiracetam]      Makes aggressive    Tramadol      Seizures    Xanax [alprazolam] Other (See Comments)     Makes aggressive     History reviewed. No pertinent family history.  Social History     Socioeconomic History    Marital status:      Spouse name: Not on file    Number of children: Not on file    Years of education: Not on file    Highest education level: Not on file   Occupational History    Not on file   Social Needs    Financial resource strain: Not on file    Food insecurity:     Worry: Not on file     Inability: Not on file    Transportation needs:     Medical: Not on file     Non-medical: Not on file   Tobacco Use    Smoking status: Former Smoker     Packs/day: 0.50     Years: 3.00     Pack years: 1.50     Types: Cigarettes     Last attempt to quit:      Years since quittin.3    Smokeless tobacco: Never Used   Substance and Sexual Activity    Alcohol use: Yes     Comment: One glass of wine once a month    Drug use: No    Sexual activity: Yes     Partners: Male     Birth control/protection: None   Lifestyle    Physical activity:     Days per week: Not on file     Minutes per session: Not on file    Stress: Not on file   Relationships    Social connections:     Talks on phone: Not on file     Gets together: Not on file     Attends Pentecostal service: Not on file     Active member of club or organization: Not on file     Attends meetings of clubs or organizations: Not on file     Relationship status: Not on file   Other Topics Concern    Not on file   Social History Narrative    Not on file       Review of Systems:  Constitutional:  Denies fever or chills   Eyes:  Denies change in visual acuity   HENT:  Denies nasal congestion or sore throat   Respiratory:  Denies cough or shortness of breath   Cardiovascular:  Denies chest pain or  edema   GI:  Denies abdominal pain, nausea, vomiting, bloody stools or diarrhea   :  Denies dysuria   Integument:  Denies rash   Neurologic:  Denies headache, focal weakness or sensory changes   Endocrine:  Denies polyuria or polydipsia   Lymphatic:  Denies swollen glands   Psychiatric:  Denies depression or anxiety     Physical Exam:   Constitutional:  Well developed, well nourished, no acute distress, non-toxic appearance   Integument:  Well hydrated, no rash   Lymphatic:  No lymphadenopathy noted   Neurologic:  Alert & oriented x 3  Psychiatric:  Speech and behavior appropriate   Gi: abdomen soft  Eyes: EOMI    Right forearm  Exam performed same as contralateral side and is normal        Left forearm  Incision healed. No erythema or fluctuance. Overall normal alignment. No TTP about the fracture site. FROM elbow and wrist. Compartments soft. Skin intact. NVI distally.        X-rays were performed today, personally reviewed by me and findings discussed with the patient.  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 I discussed that this will likely not heal without surgical intervention. RTC 3 months with repeat Xrays.

## 2020-05-15 ENCOUNTER — PATIENT MESSAGE (OUTPATIENT)
Dept: ORTHOPEDICS | Facility: CLINIC | Age: 35
End: 2020-05-15

## 2020-06-28 ENCOUNTER — PATIENT MESSAGE (OUTPATIENT)
Dept: ORTHOPEDICS | Facility: CLINIC | Age: 35
End: 2020-06-28

## 2020-06-29 ENCOUNTER — PATIENT MESSAGE (OUTPATIENT)
Dept: ORTHOPEDICS | Facility: CLINIC | Age: 35
End: 2020-06-29

## 2020-06-29 DIAGNOSIS — Z87.81 S/P ORIF (OPEN REDUCTION INTERNAL FIXATION) FRACTURE: Primary | ICD-10-CM

## 2020-06-29 DIAGNOSIS — S52.322K: ICD-10-CM

## 2020-06-29 DIAGNOSIS — Z98.890 S/P ORIF (OPEN REDUCTION INTERNAL FIXATION) FRACTURE: Primary | ICD-10-CM

## 2020-07-07 ENCOUNTER — HOSPITAL ENCOUNTER (OUTPATIENT)
Dept: RADIOLOGY | Facility: HOSPITAL | Age: 35
Discharge: HOME | End: 2020-07-07
Attending: ORTHOPAEDIC SURGERY
Payer: COMMERCIAL

## 2020-07-07 ENCOUNTER — OFFICE VISIT (OUTPATIENT)
Dept: ORTHOPEDICS | Facility: CLINIC | Age: 35
End: 2020-07-07
Payer: COMMERCIAL

## 2020-07-07 VITALS
WEIGHT: 251 LBS | SYSTOLIC BLOOD PRESSURE: 136 MMHG | DIASTOLIC BLOOD PRESSURE: 97 MMHG | HEART RATE: 91 BPM | BODY MASS INDEX: 46.19 KG/M2 | TEMPERATURE: 99 F | HEIGHT: 62 IN

## 2020-07-07 DIAGNOSIS — S52.322K: ICD-10-CM

## 2020-07-07 DIAGNOSIS — Z87.81 S/P ORIF (OPEN REDUCTION INTERNAL FIXATION) FRACTURE: ICD-10-CM

## 2020-07-07 DIAGNOSIS — Z01.818 PRE-OP TESTING: Primary | ICD-10-CM

## 2020-07-07 DIAGNOSIS — Z98.890 S/P ORIF (OPEN REDUCTION INTERNAL FIXATION) FRACTURE: ICD-10-CM

## 2020-07-07 DIAGNOSIS — S52.302K CLOSED FRACTURE OF SHAFT OF LEFT RADIUS WITH NONUNION, UNSPECIFIED FRACTURE MORPHOLOGY, SUBSEQUENT ENCOUNTER: ICD-10-CM

## 2020-07-07 PROBLEM — S52.92XK: Status: ACTIVE | Noted: 2020-07-07

## 2020-07-07 PROCEDURE — 99214 PR OFFICE/OUTPT VISIT, EST, LEVL IV, 30-39 MIN: ICD-10-PCS | Mod: S$GLB,,, | Performed by: ORTHOPAEDIC SURGERY

## 2020-07-07 PROCEDURE — 99214 OFFICE O/P EST MOD 30 MIN: CPT | Mod: S$GLB,,, | Performed by: ORTHOPAEDIC SURGERY

## 2020-07-07 PROCEDURE — 73090 X-RAY EXAM OF FOREARM: CPT | Mod: 26,LT,, | Performed by: RADIOLOGY

## 2020-07-07 PROCEDURE — 3008F BODY MASS INDEX DOCD: CPT | Mod: CPTII,S$GLB,, | Performed by: ORTHOPAEDIC SURGERY

## 2020-07-07 PROCEDURE — 99999 PR PBB SHADOW E&M-EST. PATIENT-LVL V: ICD-10-PCS | Mod: PBBFAC,,, | Performed by: ORTHOPAEDIC SURGERY

## 2020-07-07 PROCEDURE — 99999 PR PBB SHADOW E&M-EST. PATIENT-LVL V: CPT | Mod: PBBFAC,,, | Performed by: ORTHOPAEDIC SURGERY

## 2020-07-07 PROCEDURE — 73090 XR FOREARM LEFT: ICD-10-PCS | Mod: 26,LT,, | Performed by: RADIOLOGY

## 2020-07-07 PROCEDURE — 73090 X-RAY EXAM OF FOREARM: CPT | Mod: TC,PO,LT

## 2020-07-07 PROCEDURE — 3008F PR BODY MASS INDEX (BMI) DOCUMENTED: ICD-10-PCS | Mod: CPTII,S$GLB,, | Performed by: ORTHOPAEDIC SURGERY

## 2020-07-07 RX ORDER — LIDOCAINE HYDROCHLORIDE 10 MG/ML
1 INJECTION, SOLUTION EPIDURAL; INFILTRATION; INTRACAUDAL; PERINEURAL ONCE
Status: CANCELLED | OUTPATIENT
Start: 2020-07-07 | End: 2020-07-07

## 2020-07-07 RX ORDER — SODIUM CHLORIDE 0.9 % (FLUSH) 0.9 %
3 SYRINGE (ML) INJECTION EVERY 6 HOURS PRN
Status: SHIPPED | OUTPATIENT
Start: 2020-07-07

## 2020-07-09 NOTE — H&P
Chief Complaint   Patient presents with    Left Forearm - Post-op Evaluation, Pain       HPI:   This is a 35 y.o. who returns today status post left radius 11 months ago. Patient has been WBAT.  Pain is aching. No numbness or tingling. No associated signs or symptoms.     Past Medical History:   Diagnosis Date    Anxiety     Depression     Diabetes mellitus     Environmental allergies     Hyperlipidemia     Hypertension     Migraine     Seizures     last reported 2019    Thyroid disease      Past Surgical History:   Procedure Laterality Date    ABDOMINAL SURGERY          INJECTION OF ANESTHETIC AGENT AROUND NERVE Left 2019    Procedure: Block, Nerve STELLATE GANGLION;  Surgeon: Tree Tapia MD;  Location: [facility] OR;  Service: Pain Management;  Laterality: Left;    OPEN REDUCTION AND INTERNAL FIXATION (ORIF) OF FRACTURE OF RADIUS Left 2019    Procedure: ORIF, FRACTURE, RADIAL SHAFT;  Surgeon: Ravi Panchal MD;  Location: [facility] OR;  Service: Orthopedics;  Laterality: Left;    OPEN REDUCTION AND INTERNAL FIXATION (ORIF) OF FRACTURE OF RADIUS Left 2019    Procedure: ORIF, FRACTURE, RADIUS;  Surgeon: Willard Candelario MD;  Location: [facility] OR;  Service: Orthopedics;  Laterality: Left;   REVISION ORIF RADIUS  General with block    OPEN REDUCTION AND INTERNAL FIXATION (ORIF) OF FRACTURE OF RADIUS Left 2019    Procedure: Revision ORIF, FRACTURE, RADIAL SHAFT;  Surgeon: Willard Candelario MD;  Location: [facility] OR;  Service: Orthopedics;  Laterality: Left;     Current Outpatient Medications on File Prior to Visit   Medication Sig Dispense Refill    atorvastatin (LIPITOR) 20 MG tablet TAKE ONE TABLET BY MOUTH AT BEDTIME; Instr DOSE increased TO 20mg, discontinue 10mg  3    ergocalciferol (ERGOCALCIFEROL) 50,000 unit Cap Take 50,000 Units by mouth every 7 days.  0    gabapentin (NEURONTIN) 600 MG tablet Take 1 tablet (600 mg total) by mouth 3 (three) times daily. 90 tablet 2     hydrocortisone 2.5 % cream APPLY ONE GRAM TO THE AFFECTED AREA(S) TWICE DAILY  3    JARDIANCE 10 mg Tab TAKE ONE TABLET BY MOUTH EVERY DAY IN THE MORNING STOP TRADJENTA  1    ketoconazole (NIZORAL) 2 % cream APPLY ONE GRAM TO AFFECTED AREA(S) TWICE DAILY FOR 5-7 DAYS  3    ketoconazole (NIZORAL) 2 % shampoo APPLY ONE APPLICATION TO SCALP IN SHOWER 2-3 TIMES WEEKLY  3    levothyroxine (SYNTHROID) 50 MCG tablet Take 50 mcg by mouth once daily.  2    methocarbamoL (ROBAXIN) 750 MG Tab Take 1 tablet (750 mg total) by mouth 4 (four) times daily as needed. 44 tablet 0    metoprolol tartrate (LOPRESSOR) 50 MG tablet Take 50 mg by mouth 2 (two) times daily.      montelukast (SINGULAIR) 10 mg tablet Take 10 mg by mouth every evening.  2    OXcarbazepine (TRILEPTAL) 600 MG Tab Take 150 mg by mouth 2 (two) times daily.      SLOW RELEASE IRON 160 mg (50 mg iron) TbSR 160 mg once daily.   0    TRUE METRIX GLUCOSE METER Misc USE AS DIRECTED TO check blood glucose FIVE TIMES DAILY  0    TRUE METRIX GLUCOSE TEST STRIP Strp       TRUEPLUS LANCETS 30 gauge Misc USE TO CHECK BLOOD SUGAR FIVE TIMES DAILY  5     Current Facility-Administered Medications on File Prior to Visit   Medication Dose Route Frequency Provider Last Rate Last Dose    sodium chloride 0.9% flush 3 mL  3 mL Intravenous Q6H PRN Willard Candelario MD         Review of patient's allergies indicates:   Allergen Reactions    Keppra [levetiracetam]      Makes aggressive    Tramadol      Seizures    Xanax [alprazolam] Other (See Comments)     Makes aggressive     No family history on file.  Social History     Socioeconomic History    Marital status:      Spouse name: Not on file    Number of children: Not on file    Years of education: Not on file    Highest education level: Not on file   Occupational History    Not on file   Social Needs    Financial resource strain: Not on file    Food insecurity     Worry: Not on file     Inability: Not on  file    Transportation needs     Medical: Not on file     Non-medical: Not on file   Tobacco Use    Smoking status: Former Smoker     Packs/day: 0.50     Years: 3.00     Pack years: 1.50     Types: Cigarettes     Quit date:      Years since quittin.5    Smokeless tobacco: Never Used   Substance and Sexual Activity    Alcohol use: Yes     Comment: One glass of wine once a month    Drug use: No    Sexual activity: Yes     Partners: Male     Birth control/protection: None   Lifestyle    Physical activity     Days per week: Not on file     Minutes per session: Not on file    Stress: Not on file   Relationships    Social connections     Talks on phone: Not on file     Gets together: Not on file     Attends Gnosticist service: Not on file     Active member of club or organization: Not on file     Attends meetings of clubs or organizations: Not on file     Relationship status: Not on file   Other Topics Concern    Not on file   Social History Narrative    Not on file       Review of Systems:  Constitutional:  Denies fever or chills   Eyes:  Denies change in visual acuity   HENT:  Denies nasal congestion or sore throat   Respiratory:  Denies cough or shortness of breath   Cardiovascular:  Denies chest pain or edema   GI:  Denies abdominal pain, nausea, vomiting, bloody stools or diarrhea   :  Denies dysuria   Integument:  Denies rash   Neurologic:  Denies headache, focal weakness or sensory changes   Endocrine:  Denies polyuria or polydipsia   Lymphatic:  Denies swollen glands   Psychiatric:  Denies depression or anxiety     Physical Exam:   Constitutional:  Well developed, well nourished, no acute distress, non-toxic appearance   Integument:  Well hydrated, no rash   Lymphatic:  No lymphadenopathy noted   Neurologic:  Alert & oriented x 3  Psychiatric:  Speech and behavior appropriate   Gi: abdomen soft  Eyes: EOMI  Right forearm  Exam performed same as contralateral side and is normal        Left  forearm  Incision healed. No erythema or fluctuance. Overall normal alignment. Persistent TTP about the fracture site. FROM elbow and wrist. Compartments soft. Skin intact. NVI distally.        X-rays were performed today, personally reviewed by me and findings discussed with the patient.  2 views of the left radius show no interval healing with persistent nonunion        Pre-op testing  -     CBC auto differential; Future; Expected date: 07/07/2020  -     Comprehensive metabolic panel; Future; Expected date: 07/07/2020  -     Cancel: COVID-19 Routine Screening  -     COVID-19 Routine Screening; Future; Expected date: 08/07/2020    S/P ORIF (open reduction internal fixation) fracture  -     Insert peripheral IV; Standing  -     Cleanse with Chlorhexidine (CHG); Standing  -     Diet NPO; Standing  -     Place TIA hose; Standing  -     Place sequential compression device; Standing  -     Case Request Operating Room: ORIF, FRACTURE, RADIUS  Revision    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 They voiced understanding and wish to proceed with surgery. Consents signed in clinic.

## 2020-07-09 NOTE — H&P (VIEW-ONLY)
Chief Complaint   Patient presents with    Left Forearm - Post-op Evaluation, Pain       HPI:   This is a 35 y.o. who returns today status post left radius 11 months ago. Patient has been WBAT.  Pain is aching. No numbness or tingling. No associated signs or symptoms.     Past Medical History:   Diagnosis Date    Anxiety     Depression     Diabetes mellitus     Environmental allergies     Hyperlipidemia     Hypertension     Migraine     Seizures     last reported 2019    Thyroid disease      Past Surgical History:   Procedure Laterality Date    ABDOMINAL SURGERY          INJECTION OF ANESTHETIC AGENT AROUND NERVE Left 2019    Procedure: Block, Nerve STELLATE GANGLION;  Surgeon: Tree Tapia MD;  Location: [facility] OR;  Service: Pain Management;  Laterality: Left;    OPEN REDUCTION AND INTERNAL FIXATION (ORIF) OF FRACTURE OF RADIUS Left 2019    Procedure: ORIF, FRACTURE, RADIAL SHAFT;  Surgeon: Ravi Panchal MD;  Location: [facility] OR;  Service: Orthopedics;  Laterality: Left;    OPEN REDUCTION AND INTERNAL FIXATION (ORIF) OF FRACTURE OF RADIUS Left 2019    Procedure: ORIF, FRACTURE, RADIUS;  Surgeon: Willard Candelario MD;  Location: [facility] OR;  Service: Orthopedics;  Laterality: Left;   REVISION ORIF RADIUS  General with block    OPEN REDUCTION AND INTERNAL FIXATION (ORIF) OF FRACTURE OF RADIUS Left 2019    Procedure: Revision ORIF, FRACTURE, RADIAL SHAFT;  Surgeon: Willard Candelario MD;  Location: [facility] OR;  Service: Orthopedics;  Laterality: Left;     Current Outpatient Medications on File Prior to Visit   Medication Sig Dispense Refill    atorvastatin (LIPITOR) 20 MG tablet TAKE ONE TABLET BY MOUTH AT BEDTIME; Instr DOSE increased TO 20mg, discontinue 10mg  3    ergocalciferol (ERGOCALCIFEROL) 50,000 unit Cap Take 50,000 Units by mouth every 7 days.  0    gabapentin (NEURONTIN) 600 MG tablet Take 1 tablet (600 mg total) by mouth 3 (three) times daily. 90 tablet 2     hydrocortisone 2.5 % cream APPLY ONE GRAM TO THE AFFECTED AREA(S) TWICE DAILY  3    JARDIANCE 10 mg Tab TAKE ONE TABLET BY MOUTH EVERY DAY IN THE MORNING STOP TRADJENTA  1    ketoconazole (NIZORAL) 2 % cream APPLY ONE GRAM TO AFFECTED AREA(S) TWICE DAILY FOR 5-7 DAYS  3    ketoconazole (NIZORAL) 2 % shampoo APPLY ONE APPLICATION TO SCALP IN SHOWER 2-3 TIMES WEEKLY  3    levothyroxine (SYNTHROID) 50 MCG tablet Take 50 mcg by mouth once daily.  2    methocarbamoL (ROBAXIN) 750 MG Tab Take 1 tablet (750 mg total) by mouth 4 (four) times daily as needed. 44 tablet 0    metoprolol tartrate (LOPRESSOR) 50 MG tablet Take 50 mg by mouth 2 (two) times daily.      montelukast (SINGULAIR) 10 mg tablet Take 10 mg by mouth every evening.  2    OXcarbazepine (TRILEPTAL) 600 MG Tab Take 150 mg by mouth 2 (two) times daily.      SLOW RELEASE IRON 160 mg (50 mg iron) TbSR 160 mg once daily.   0    TRUE METRIX GLUCOSE METER Misc USE AS DIRECTED TO check blood glucose FIVE TIMES DAILY  0    TRUE METRIX GLUCOSE TEST STRIP Strp       TRUEPLUS LANCETS 30 gauge Misc USE TO CHECK BLOOD SUGAR FIVE TIMES DAILY  5     Current Facility-Administered Medications on File Prior to Visit   Medication Dose Route Frequency Provider Last Rate Last Dose    sodium chloride 0.9% flush 3 mL  3 mL Intravenous Q6H PRN Willard Candelario MD         Review of patient's allergies indicates:   Allergen Reactions    Keppra [levetiracetam]      Makes aggressive    Tramadol      Seizures    Xanax [alprazolam] Other (See Comments)     Makes aggressive     No family history on file.  Social History     Socioeconomic History    Marital status:      Spouse name: Not on file    Number of children: Not on file    Years of education: Not on file    Highest education level: Not on file   Occupational History    Not on file   Social Needs    Financial resource strain: Not on file    Food insecurity     Worry: Not on file     Inability: Not on  file    Transportation needs     Medical: Not on file     Non-medical: Not on file   Tobacco Use    Smoking status: Former Smoker     Packs/day: 0.50     Years: 3.00     Pack years: 1.50     Types: Cigarettes     Quit date:      Years since quittin.5    Smokeless tobacco: Never Used   Substance and Sexual Activity    Alcohol use: Yes     Comment: One glass of wine once a month    Drug use: No    Sexual activity: Yes     Partners: Male     Birth control/protection: None   Lifestyle    Physical activity     Days per week: Not on file     Minutes per session: Not on file    Stress: Not on file   Relationships    Social connections     Talks on phone: Not on file     Gets together: Not on file     Attends Hinduism service: Not on file     Active member of club or organization: Not on file     Attends meetings of clubs or organizations: Not on file     Relationship status: Not on file   Other Topics Concern    Not on file   Social History Narrative    Not on file       Review of Systems:  Constitutional:  Denies fever or chills   Eyes:  Denies change in visual acuity   HENT:  Denies nasal congestion or sore throat   Respiratory:  Denies cough or shortness of breath   Cardiovascular:  Denies chest pain or edema   GI:  Denies abdominal pain, nausea, vomiting, bloody stools or diarrhea   :  Denies dysuria   Integument:  Denies rash   Neurologic:  Denies headache, focal weakness or sensory changes   Endocrine:  Denies polyuria or polydipsia   Lymphatic:  Denies swollen glands   Psychiatric:  Denies depression or anxiety     Physical Exam:   Constitutional:  Well developed, well nourished, no acute distress, non-toxic appearance   Integument:  Well hydrated, no rash   Lymphatic:  No lymphadenopathy noted   Neurologic:  Alert & oriented x 3  Psychiatric:  Speech and behavior appropriate   Gi: abdomen soft  Eyes: EOMI  Right forearm  Exam performed same as contralateral side and is normal        Left  forearm  Incision healed. No erythema or fluctuance. Overall normal alignment. Persistent TTP about the fracture site. FROM elbow and wrist. Compartments soft. Skin intact. NVI distally.        X-rays were performed today, personally reviewed by me and findings discussed with the patient.  2 views of the left radius show no interval healing with persistent nonunion        Pre-op testing  -     CBC auto differential; Future; Expected date: 07/07/2020  -     Comprehensive metabolic panel; Future; Expected date: 07/07/2020  -     Cancel: COVID-19 Routine Screening  -     COVID-19 Routine Screening; Future; Expected date: 08/07/2020    S/P ORIF (open reduction internal fixation) fracture  -     Insert peripheral IV; Standing  -     Cleanse with Chlorhexidine (CHG); Standing  -     Diet NPO; Standing  -     Place TIA hose; Standing  -     Place sequential compression device; Standing  -     Case Request Operating Room: ORIF, FRACTURE, RADIUS  Revision    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 They voiced understanding and wish to proceed with surgery. Consents signed in clinic.

## 2020-07-14 ENCOUNTER — LAB VISIT (OUTPATIENT)
Dept: LAB | Facility: HOSPITAL | Age: 35
End: 2020-07-14
Attending: ORTHOPAEDIC SURGERY
Payer: COMMERCIAL

## 2020-07-14 ENCOUNTER — LAB VISIT (OUTPATIENT)
Dept: FAMILY MEDICINE | Facility: CLINIC | Age: 35
End: 2020-07-14
Payer: COMMERCIAL

## 2020-07-14 DIAGNOSIS — Z01.818 PRE-OP TESTING: ICD-10-CM

## 2020-07-14 LAB
ALBUMIN SERPL BCP-MCNC: 3.8 G/DL (ref 3.5–5.2)
ALP SERPL-CCNC: 142 U/L (ref 55–135)
ALT SERPL W/O P-5'-P-CCNC: 36 U/L (ref 10–44)
ANION GAP SERPL CALC-SCNC: 11 MMOL/L (ref 8–16)
AST SERPL-CCNC: 21 U/L (ref 10–40)
BASOPHILS # BLD AUTO: 0.07 K/UL (ref 0–0.2)
BASOPHILS NFR BLD: 0.6 % (ref 0–1.9)
BILIRUB SERPL-MCNC: 0.2 MG/DL (ref 0.1–1)
BUN SERPL-MCNC: 12 MG/DL (ref 6–20)
CALCIUM SERPL-MCNC: 10.1 MG/DL (ref 8.7–10.5)
CHLORIDE SERPL-SCNC: 106 MMOL/L (ref 95–110)
CO2 SERPL-SCNC: 26 MMOL/L (ref 23–29)
CREAT SERPL-MCNC: 0.8 MG/DL (ref 0.5–1.4)
DIFFERENTIAL METHOD: ABNORMAL
EOSINOPHIL # BLD AUTO: 0.1 K/UL (ref 0–0.5)
EOSINOPHIL NFR BLD: 0.6 % (ref 0–8)
ERYTHROCYTE [DISTWIDTH] IN BLOOD BY AUTOMATED COUNT: 12.5 % (ref 11.5–14.5)
EST. GFR  (AFRICAN AMERICAN): >60 ML/MIN/1.73 M^2
EST. GFR  (NON AFRICAN AMERICAN): >60 ML/MIN/1.73 M^2
GLUCOSE SERPL-MCNC: 98 MG/DL (ref 70–110)
HCT VFR BLD AUTO: 43.5 % (ref 37–48.5)
HGB BLD-MCNC: 14.1 G/DL (ref 12–16)
IMM GRANULOCYTES # BLD AUTO: 0.07 K/UL (ref 0–0.04)
IMM GRANULOCYTES NFR BLD AUTO: 0.6 % (ref 0–0.5)
LYMPHOCYTES # BLD AUTO: 3 K/UL (ref 1–4.8)
LYMPHOCYTES NFR BLD: 23.8 % (ref 18–48)
MCH RBC QN AUTO: 30.9 PG (ref 27–31)
MCHC RBC AUTO-ENTMCNC: 32.4 G/DL (ref 32–36)
MCV RBC AUTO: 95 FL (ref 82–98)
MONOCYTES # BLD AUTO: 0.7 K/UL (ref 0.3–1)
MONOCYTES NFR BLD: 5.6 % (ref 4–15)
NEUTROPHILS # BLD AUTO: 8.6 K/UL (ref 1.8–7.7)
NEUTROPHILS NFR BLD: 68.8 % (ref 38–73)
NRBC BLD-RTO: 0 /100 WBC
PLATELET # BLD AUTO: 340 K/UL (ref 150–350)
PMV BLD AUTO: 10.4 FL (ref 9.2–12.9)
POTASSIUM SERPL-SCNC: 4.3 MMOL/L (ref 3.5–5.1)
PROT SERPL-MCNC: 7.9 G/DL (ref 6–8.4)
RBC # BLD AUTO: 4.57 M/UL (ref 4–5.4)
SODIUM SERPL-SCNC: 143 MMOL/L (ref 136–145)
WBC # BLD AUTO: 12.52 K/UL (ref 3.9–12.7)

## 2020-07-14 PROCEDURE — 85025 COMPLETE CBC W/AUTO DIFF WBC: CPT

## 2020-07-14 PROCEDURE — 36415 COLL VENOUS BLD VENIPUNCTURE: CPT | Mod: PO

## 2020-07-14 PROCEDURE — U0003 INFECTIOUS AGENT DETECTION BY NUCLEIC ACID (DNA OR RNA); SEVERE ACUTE RESPIRATORY SYNDROME CORONAVIRUS 2 (SARS-COV-2) (CORONAVIRUS DISEASE [COVID-19]), AMPLIFIED PROBE TECHNIQUE, MAKING USE OF HIGH THROUGHPUT TECHNOLOGIES AS DESCRIBED BY CMS-2020-01-R: HCPCS

## 2020-07-14 PROCEDURE — 80053 COMPREHEN METABOLIC PANEL: CPT

## 2020-07-15 ENCOUNTER — ANESTHESIA EVENT (OUTPATIENT)
Dept: SURGERY | Facility: HOSPITAL | Age: 35
End: 2020-07-15
Payer: COMMERCIAL

## 2020-07-15 LAB — SARS-COV-2 RNA RESP QL NAA+PROBE: NOT DETECTED

## 2020-07-16 ENCOUNTER — PATIENT MESSAGE (OUTPATIENT)
Dept: ORTHOPEDICS | Facility: CLINIC | Age: 35
End: 2020-07-16

## 2020-07-16 DIAGNOSIS — Z98.890 S/P ORIF (OPEN REDUCTION INTERNAL FIXATION) FRACTURE: Primary | ICD-10-CM

## 2020-07-16 DIAGNOSIS — Z87.81 S/P ORIF (OPEN REDUCTION INTERNAL FIXATION) FRACTURE: Primary | ICD-10-CM

## 2020-07-17 ENCOUNTER — ANESTHESIA (OUTPATIENT)
Dept: SURGERY | Facility: HOSPITAL | Age: 35
End: 2020-07-17
Payer: COMMERCIAL

## 2020-07-17 ENCOUNTER — HOSPITAL ENCOUNTER (OUTPATIENT)
Dept: RADIOLOGY | Facility: HOSPITAL | Age: 35
Discharge: HOME | End: 2020-07-17
Attending: ORTHOPAEDIC SURGERY | Admitting: ORTHOPAEDIC SURGERY
Payer: COMMERCIAL

## 2020-07-17 ENCOUNTER — HOSPITAL ENCOUNTER (OUTPATIENT)
Facility: HOSPITAL | Age: 35
Discharge: HOME | End: 2020-07-17
Attending: ORTHOPAEDIC SURGERY | Admitting: ORTHOPAEDIC SURGERY
Payer: COMMERCIAL

## 2020-07-17 VITALS
HEIGHT: 62 IN | DIASTOLIC BLOOD PRESSURE: 78 MMHG | BODY MASS INDEX: 46.21 KG/M2 | HEART RATE: 78 BPM | OXYGEN SATURATION: 100 % | RESPIRATION RATE: 16 BRPM | TEMPERATURE: 98 F | SYSTOLIC BLOOD PRESSURE: 132 MMHG | WEIGHT: 251.13 LBS

## 2020-07-17 DIAGNOSIS — Z01.818 PRE-OP TESTING: ICD-10-CM

## 2020-07-17 DIAGNOSIS — Z87.81 S/P ORIF (OPEN REDUCTION INTERNAL FIXATION) FRACTURE: ICD-10-CM

## 2020-07-17 DIAGNOSIS — S52.92XK: ICD-10-CM

## 2020-07-17 DIAGNOSIS — S52.322K: ICD-10-CM

## 2020-07-17 DIAGNOSIS — Z98.890 S/P ORIF (OPEN REDUCTION INTERNAL FIXATION) FRACTURE: ICD-10-CM

## 2020-07-17 LAB
B-HCG UR QL: NEGATIVE
CTP QC/QA: YES
GLUCOSE SERPL-MCNC: 152 MG/DL (ref 70–110)

## 2020-07-17 PROCEDURE — 71000015 HC POSTOP RECOV 1ST HR: Mod: PO | Performed by: ORTHOPAEDIC SURGERY

## 2020-07-17 PROCEDURE — 27800903 OPTIME MED/SURG SUP & DEVICES OTHER IMPLANTS: Mod: PO | Performed by: ORTHOPAEDIC SURGERY

## 2020-07-17 PROCEDURE — D9220A PRA ANESTHESIA: Mod: CRNA,,, | Performed by: NURSE ANESTHETIST, CERTIFIED REGISTERED

## 2020-07-17 PROCEDURE — D9220A PRA ANESTHESIA: Mod: ANES,,, | Performed by: ANESTHESIOLOGY

## 2020-07-17 PROCEDURE — 76942 ECHO GUIDE FOR BIOPSY: CPT | Mod: PO,59 | Performed by: ANESTHESIOLOGY

## 2020-07-17 PROCEDURE — 76942 ECHO GUIDE FOR BIOPSY: CPT | Mod: 26,,, | Performed by: ANESTHESIOLOGY

## 2020-07-17 PROCEDURE — 25400 PR REPAIR NONUNION RADIUS OR ULNA: ICD-10-PCS | Mod: LT,,, | Performed by: ORTHOPAEDIC SURGERY

## 2020-07-17 PROCEDURE — 64415 NJX AA&/STRD BRCH PLXS IMG: CPT | Mod: 59,LT,, | Performed by: ANESTHESIOLOGY

## 2020-07-17 PROCEDURE — 63600175 PHARM REV CODE 636 W HCPCS: Mod: PO | Performed by: ANESTHESIOLOGY

## 2020-07-17 PROCEDURE — D9220A PRA ANESTHESIA: ICD-10-PCS | Mod: ANES,,, | Performed by: ANESTHESIOLOGY

## 2020-07-17 PROCEDURE — 63600175 PHARM REV CODE 636 W HCPCS: Mod: PO | Performed by: ORTHOPAEDIC SURGERY

## 2020-07-17 PROCEDURE — 81025 URINE PREGNANCY TEST: CPT | Mod: PO | Performed by: ORTHOPAEDIC SURGERY

## 2020-07-17 PROCEDURE — 71000033 HC RECOVERY, INTIAL HOUR: Mod: PO | Performed by: ORTHOPAEDIC SURGERY

## 2020-07-17 PROCEDURE — S0020 INJECTION, BUPIVICAINE HYDRO: HCPCS | Mod: PO | Performed by: ANESTHESIOLOGY

## 2020-07-17 PROCEDURE — 25400 REPAIR RADIUS OR ULNA: CPT | Mod: LT,,, | Performed by: ORTHOPAEDIC SURGERY

## 2020-07-17 PROCEDURE — 25000003 PHARM REV CODE 250: Mod: PO | Performed by: ANESTHESIOLOGY

## 2020-07-17 PROCEDURE — 25000003 PHARM REV CODE 250: Mod: PO | Performed by: NURSE ANESTHETIST, CERTIFIED REGISTERED

## 2020-07-17 PROCEDURE — 63600175 PHARM REV CODE 636 W HCPCS: Mod: PO | Performed by: NURSE ANESTHETIST, CERTIFIED REGISTERED

## 2020-07-17 PROCEDURE — 36000710: Mod: PO | Performed by: ORTHOPAEDIC SURGERY

## 2020-07-17 PROCEDURE — 76000 FLUOROSCOPY <1 HR PHYS/QHP: CPT | Mod: TC,PO

## 2020-07-17 PROCEDURE — 64415 NJX AA&/STRD BRCH PLXS IMG: CPT | Mod: PO | Performed by: ANESTHESIOLOGY

## 2020-07-17 PROCEDURE — 27201423 OPTIME MED/SURG SUP & DEVICES STERILE SUPPLY: Mod: PO | Performed by: ORTHOPAEDIC SURGERY

## 2020-07-17 PROCEDURE — C9290 INJ, BUPIVACAINE LIPOSOME: HCPCS | Mod: PO | Performed by: ANESTHESIOLOGY

## 2020-07-17 PROCEDURE — 37000008 HC ANESTHESIA 1ST 15 MINUTES: Mod: PO | Performed by: ORTHOPAEDIC SURGERY

## 2020-07-17 PROCEDURE — 76942 PR U/S GUIDANCE FOR NEEDLE GUIDANCE: ICD-10-PCS | Mod: 26,,, | Performed by: ANESTHESIOLOGY

## 2020-07-17 PROCEDURE — C1713 ANCHOR/SCREW BN/BN,TIS/BN: HCPCS | Mod: PO | Performed by: ORTHOPAEDIC SURGERY

## 2020-07-17 PROCEDURE — 64415 PR NERVE BLOCK INJ, ANES/STEROID, BRACHIAL PLEXUS, INCL IMAG GUIDANCE: ICD-10-PCS | Mod: 59,LT,, | Performed by: ANESTHESIOLOGY

## 2020-07-17 PROCEDURE — D9220A PRA ANESTHESIA: ICD-10-PCS | Mod: CRNA,,, | Performed by: NURSE ANESTHETIST, CERTIFIED REGISTERED

## 2020-07-17 PROCEDURE — 36000711: Mod: PO | Performed by: ORTHOPAEDIC SURGERY

## 2020-07-17 PROCEDURE — 37000009 HC ANESTHESIA EA ADD 15 MINS: Mod: PO | Performed by: ORTHOPAEDIC SURGERY

## 2020-07-17 DEVICE — SCREW BONE NON LOCK 3.5X14MM: Type: IMPLANTABLE DEVICE | Site: WRIST | Status: FUNCTIONAL

## 2020-07-17 DEVICE — MATRIX BONE BIO4 VIABLE 5CC: Type: IMPLANTABLE DEVICE | Site: WRIST | Status: FUNCTIONAL

## 2020-07-17 DEVICE — SCREW BONE LOCK T10 3.5X14MM: Type: IMPLANTABLE DEVICE | Site: WRIST | Status: FUNCTIONAL

## 2020-07-17 DEVICE — SCREW BONE NON LOCK 3.5X16MM: Type: IMPLANTABLE DEVICE | Site: WRIST | Status: FUNCTIONAL

## 2020-07-17 RX ORDER — FENTANYL CITRATE 50 UG/ML
INJECTION, SOLUTION INTRAMUSCULAR; INTRAVENOUS
Status: DISCONTINUED | OUTPATIENT
Start: 2020-07-17 | End: 2020-07-17

## 2020-07-17 RX ORDER — SODIUM CHLORIDE, SODIUM LACTATE, POTASSIUM CHLORIDE, CALCIUM CHLORIDE 600; 310; 30; 20 MG/100ML; MG/100ML; MG/100ML; MG/100ML
INJECTION, SOLUTION INTRAVENOUS CONTINUOUS
Status: DISPENSED | OUTPATIENT
Start: 2020-07-17

## 2020-07-17 RX ORDER — OXYCODONE HYDROCHLORIDE 5 MG/1
5 TABLET ORAL ONCE AS NEEDED
Status: DISCONTINUED | OUTPATIENT
Start: 2020-07-17 | End: 2020-07-17 | Stop reason: HOSPADM

## 2020-07-17 RX ORDER — BUPIVACAINE HYDROCHLORIDE 5 MG/ML
INJECTION, SOLUTION EPIDURAL; INTRACAUDAL
Status: DISCONTINUED | OUTPATIENT
Start: 2020-07-17 | End: 2020-07-17

## 2020-07-17 RX ORDER — OXYCODONE AND ACETAMINOPHEN 10; 325 MG/1; MG/1
1 TABLET ORAL EVERY 6 HOURS PRN
Qty: 44 TABLET | Refills: 0 | Status: SHIPPED | OUTPATIENT
Start: 2020-07-17 | End: 2020-07-28 | Stop reason: SDUPTHER

## 2020-07-17 RX ORDER — METHOCARBAMOL 500 MG/1
500 TABLET, FILM COATED ORAL 4 TIMES DAILY PRN
Qty: 44 TABLET | Refills: 0 | Status: ON HOLD | OUTPATIENT
Start: 2020-07-17 | End: 2021-07-09 | Stop reason: HOSPADM

## 2020-07-17 RX ORDER — CEFAZOLIN SODIUM 2 G/50ML
2 SOLUTION INTRAVENOUS
Status: COMPLETED | OUTPATIENT
Start: 2020-07-17 | End: 2020-07-17

## 2020-07-17 RX ORDER — LIDOCAINE HYDROCHLORIDE 10 MG/ML
1 INJECTION, SOLUTION EPIDURAL; INFILTRATION; INTRACAUDAL; PERINEURAL ONCE
Status: DISCONTINUED | OUTPATIENT
Start: 2020-07-17 | End: 2020-07-17 | Stop reason: HOSPADM

## 2020-07-17 RX ORDER — CETIRIZINE HYDROCHLORIDE 10 MG/1
10 TABLET ORAL DAILY
COMMUNITY
End: 2022-05-16 | Stop reason: CLARIF

## 2020-07-17 RX ORDER — SUCCINYLCHOLINE CHLORIDE 20 MG/ML
INJECTION INTRAMUSCULAR; INTRAVENOUS
Status: DISCONTINUED | OUTPATIENT
Start: 2020-07-17 | End: 2020-07-17

## 2020-07-17 RX ORDER — PROPOFOL 10 MG/ML
VIAL (ML) INTRAVENOUS
Status: DISCONTINUED | OUTPATIENT
Start: 2020-07-17 | End: 2020-07-17

## 2020-07-17 RX ORDER — LIDOCAINE HYDROCHLORIDE 10 MG/ML
1 INJECTION, SOLUTION EPIDURAL; INFILTRATION; INTRACAUDAL; PERINEURAL ONCE
Status: ACTIVE | OUTPATIENT
Start: 2020-07-17

## 2020-07-17 RX ORDER — MIDAZOLAM HYDROCHLORIDE 1 MG/ML
INJECTION, SOLUTION INTRAMUSCULAR; INTRAVENOUS
Status: DISCONTINUED | OUTPATIENT
Start: 2020-07-17 | End: 2020-07-17

## 2020-07-17 RX ORDER — ROCURONIUM BROMIDE 10 MG/ML
INJECTION, SOLUTION INTRAVENOUS
Status: DISCONTINUED | OUTPATIENT
Start: 2020-07-17 | End: 2020-07-17

## 2020-07-17 RX ORDER — FENTANYL CITRATE 50 UG/ML
25 INJECTION, SOLUTION INTRAMUSCULAR; INTRAVENOUS EVERY 5 MIN PRN
Status: DISCONTINUED | OUTPATIENT
Start: 2020-07-17 | End: 2020-07-17 | Stop reason: HOSPADM

## 2020-07-17 RX ORDER — LIDOCAINE HYDROCHLORIDE 20 MG/ML
INJECTION INTRAVENOUS
Status: DISCONTINUED | OUTPATIENT
Start: 2020-07-17 | End: 2020-07-17

## 2020-07-17 RX ORDER — ONDANSETRON 2 MG/ML
INJECTION INTRAMUSCULAR; INTRAVENOUS
Status: DISCONTINUED | OUTPATIENT
Start: 2020-07-17 | End: 2020-07-17

## 2020-07-17 RX ORDER — KETOROLAC TROMETHAMINE 30 MG/ML
INJECTION, SOLUTION INTRAMUSCULAR; INTRAVENOUS
Status: DISCONTINUED | OUTPATIENT
Start: 2020-07-17 | End: 2020-07-17

## 2020-07-17 RX ORDER — ACETAMINOPHEN 10 MG/ML
INJECTION, SOLUTION INTRAVENOUS
Status: DISCONTINUED | OUTPATIENT
Start: 2020-07-17 | End: 2020-07-17

## 2020-07-17 RX ADMIN — PROPOFOL 170 MG: 10 INJECTION, EMULSION INTRAVENOUS at 07:07

## 2020-07-17 RX ADMIN — BUPIVACAINE HYDROCHLORIDE 10 ML: 5 INJECTION, SOLUTION EPIDURAL; INTRACAUDAL; PERINEURAL at 07:07

## 2020-07-17 RX ADMIN — FENTANYL CITRATE 50 MCG: 50 INJECTION, SOLUTION INTRAMUSCULAR; INTRAVENOUS at 07:07

## 2020-07-17 RX ADMIN — ONDANSETRON 4 MG: 2 INJECTION, SOLUTION INTRAMUSCULAR; INTRAVENOUS at 08:07

## 2020-07-17 RX ADMIN — FENTANYL CITRATE 25 MCG: 50 INJECTION, SOLUTION INTRAMUSCULAR; INTRAVENOUS at 08:07

## 2020-07-17 RX ADMIN — CEFAZOLIN SODIUM 2 G: 2 SOLUTION INTRAVENOUS at 07:07

## 2020-07-17 RX ADMIN — MIDAZOLAM 2 MG: 1 INJECTION INTRAMUSCULAR; INTRAVENOUS at 07:07

## 2020-07-17 RX ADMIN — SODIUM CHLORIDE, SODIUM LACTATE, POTASSIUM CHLORIDE, AND CALCIUM CHLORIDE: .6; .31; .03; .02 INJECTION, SOLUTION INTRAVENOUS at 06:07

## 2020-07-17 RX ADMIN — SUCCINYLCHOLINE CHLORIDE 140 MG: 20 INJECTION, SOLUTION INTRAMUSCULAR; INTRAVENOUS at 07:07

## 2020-07-17 RX ADMIN — KETOROLAC TROMETHAMINE 30 MG: 30 INJECTION, SOLUTION INTRAMUSCULAR; INTRAVENOUS at 08:07

## 2020-07-17 RX ADMIN — ROCURONIUM BROMIDE 5 MG: 10 INJECTION, SOLUTION INTRAVENOUS at 07:07

## 2020-07-17 RX ADMIN — ACETAMINOPHEN 1000 MG: 10 INJECTION, SOLUTION INTRAVENOUS at 08:07

## 2020-07-17 RX ADMIN — LIDOCAINE HYDROCHLORIDE 75 MG: 20 INJECTION, SOLUTION INTRAVENOUS at 07:07

## 2020-07-17 RX ADMIN — BUPIVACAINE 10 ML: 13.3 INJECTION, SUSPENSION, LIPOSOMAL INFILTRATION at 07:07

## 2020-07-17 NOTE — ANESTHESIA PROCEDURE NOTES
Supraclavicular Brachial Plexus Single Injection Block    Patient location during procedure: pre-op   Block not for primary anesthetic.  Reason for block: at surgeon's request and post-op pain management   Post-op Pain Location: Left wrist pain  Start time: 7/17/2020 7:00 AM  Timeout: 7/17/2020 6:58 AM   End time: 7/17/2020 7:03 AM    Staffing  Authorizing Provider: Stone Reza MD  Performing Provider: Stone Reza MD    Preanesthetic Checklist  Completed: patient identified, site marked, surgical consent, pre-op evaluation, timeout performed, IV checked, risks and benefits discussed and monitors and equipment checked  Peripheral Block  Patient position: supine  Prep: ChloraPrep  Patient monitoring: heart rate, cardiac monitor, continuous pulse ox, continuous capnometry and frequent blood pressure checks  Block type: supraclavicular  Laterality: left  Injection technique: single shot  Needle  Needle type: Stimuplex   Needle gauge: 22 G  Needle length: 2 in  Needle localization: anatomical landmarks and ultrasound guidance   -ultrasound image captured on disc.  Assessment  Injection assessment: negative aspiration, negative parasthesia and local visualized surrounding nerve  Paresthesia pain: none  Heart rate change: no  Slow fractionated injection: yes  Additional Notes  VSS.  [facility] RN monitoring vitals throughout procedure.  Patient tolerated procedure well.

## 2020-07-17 NOTE — INTERVAL H&P NOTE
The patient has been examined and the H&P has been reviewed:    I concur with the findings and no changes have occurred since H&P was written.    Anesthesia/Surgery risks, benefits and alternative options discussed and understood by patient/family.          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

## 2020-07-17 NOTE — ANESTHESIA PREPROCEDURE EVALUATION
07/17/2020 April FLYNN Kirk is a 35 y.o., female.    Anesthesia Evaluation          Review of Systems  Anesthesia Hx:  No problems with previous Anesthesia   Cardiovascular:   Exercise tolerance: good Hypertension    Pulmonary:  Pulmonary Normal    Renal/:  Renal/ Normal     Hepatic/GI:  Hepatic/GI Normal    Neurological:   Neuromuscular Disease, Headaches Seizures   Peripheral Neuropathy    Endocrine:   Diabetes, type 2    Psych:   Psychiatric History depression          Physical Exam  General:  Morbid Obesity    Airway/Jaw/Neck:  Airway Findings: Mouth Opening: Normal Tongue: Normal  General Airway Assessment: Adult  Mallampati: II  Improves to I with phonation.  TM Distance: Normal, at least 6 cm       Chest/Lungs:  Chest/Lungs Clear    Heart/Vascular:  Heart Findings: Normal            Anesthesia Plan  Type of Anesthesia, risks & benefits discussed:  Anesthesia Type:  general  Patient's Preference:   Intra-op Monitoring Plan: standard ASA monitors  Intra-op Monitoring Plan Comments:   Post Op Pain Control Plan: multimodal analgesia, IV/PO Opioids PRN and peripheral nerve block  Post Op Pain Control Plan Comments:   Induction:   IV  Beta Blocker:  Patient is not currently on a Beta-Blocker (No further documentation required).       Informed Consent: Patient understands risks and agrees with Anesthesia plan.  Questions answered. Anesthesia consent signed with patient.  ASA Score: 3     Day of Surgery Review of History & Physical:    H&P update referred to the surgeon.     Anesthesia Plan Notes: I have personally evaluated the patient and discussed risk/benefits/alternatives of general anesthesia.        Ready For Surgery From Anesthesia Perspective.

## 2020-07-17 NOTE — DISCHARGE INSTRUCTIONS
DOS:   Keep affected wrist elevated above the level of your heart   Check circulation frequently in fingers by pressing on the nail bed. Nail bed should turn white and then pink when released.   Exercise fingers to promote circulation.   Advance diet as tolerated   Resume home medications    WEAR SLING AT ALL TIMES UNTIL YOU HAVE FULL CONTROL OF YOUR LEFT ARM THEN WEAR AS NEEDED FOR COMFORT    Keep splint dressing clean and dry for two days by covering it with 2 plastic bags secured with rubber bands above your dressing.    DONT:  DO NOT GET LEFT HAND/ARM WET for 2 days                  DO NOT REMOVE DRESSING for 2 days     No driving for 24 hours or while taking narcotic pain medication   DO NOT TAKE ADDITIONAL TYLENOL/ACETAMINOPHEN WHILE TAKING NARCOTIC PAIN MEDICATION THAT CONTAINS TYLENOL/ACETAMINOPHEN.    CALL PHYSICIAN FOR:   Obvious bleeding   Excessive swelling   Drainage (pus) from the wound   Pain unrelieved by pain medication.    Make return appointment for 2 weeks    FOR EMERGENCIES:  Contact your physician at [phone]      Exparel(bupivacaine) has been injected to provide approximately 72 hours of reduced pain after your surgery.  Do not remove the bracelet for five days  Report to your doctor as soon as possible the following:   Restlessness   Anxiety   Speech problems,    Lightheadedness   Numbness and tingling of the mouth and lips   Seizures    Metallic taste   Blurred vision   Tremors    Twitching   Depression   Extreme drowsiness  Avoid additional use of local anesthetics (such as dental procedures) for five days (96 hours)         Discharge Instructions: After Your Surgery/Procedure  Youve just had surgery. During surgery you were given medicine called anesthesia to keep you relaxed and free of pain. After surgery you may have some pain or nausea. This is common. Here are some tips for feeling better and getting well after surgery.     Stay on schedule with your medication.  "  Going home  Your doctor or nurse will show you how to take care of yourself when you go home. He or she will also answer your questions. Have an adult family member or friend drive you home.      For your safety we recommend these precaution for the first 24 hours after your procedure:  · Do not drive or use heavy equipment.  · Do not make important decisions or sign legal papers.  · Do not drink alcohol.  · Have someone stay with you, if needed. He or she can watch for problems and help keep you safe.  · Your concentration, balance, coordination, and judgement may be impaired for many hours after anesthesia.  Use caution when ambulating or standing up.     · You may feel weak and "washed out" after anesthesia and surgery.      Subtle residual effects of general anesthesia or sedation with regional / local anesthesia can last more than 24 hours.  Rest for the remainder of the day or longer if your Doctor/Surgeon has advised you to do so.  Although you may feel normal within the first 24 hours, your reflexes and mental ability may be impaired without you realizing it.  You may feel dizzy, lightheaded or sleepy for 24 hours or longer.      Be sure to go to all follow-up visits with your doctor. And rest after your surgery for as long as your doctor tells you to.  Coping with pain  If you have pain after surgery, pain medicine will help you feel better. Take it as told, before pain becomes severe. Also, ask your doctor or pharmacist about other ways to control pain. This might be with heat, ice, or relaxation. And follow any other instructions your surgeon or nurse gives you.  Tips for taking pain medicine  To get the best relief possible, remember these points:  · Pain medicines can upset your stomach. Taking them with a little food may help.  · Most pain relievers taken by mouth need at least 20 to 30 minutes to start to work.  · Taking medicine on a schedule can help you remember to take it. Try to time your " medicine so that you can take it before starting an activity. This might be before you get dressed, go for a walk, or sit down for dinner.  · Constipation is a common side effect of pain medicines. Call your doctor before taking any medicines such as laxatives or stool softeners to help ease constipation. Also ask if you should skip any foods. Drinking lots of fluids and eating foods such as fruits and vegetables that are high in fiber can also help. Remember, do not take laxatives unless your surgeon has prescribed them.  · Drinking alcohol and taking pain medicine can cause dizziness and slow your breathing. It can even be deadly. Do not drink alcohol while taking pain medicine.  · Pain medicine can make you react more slowly to things. Do not drive or run machinery while taking pain medicine.  Your health care provider may tell you to take acetaminophen to help ease your pain. Ask him or her how much you are supposed to take each day. Acetaminophen or other pain relievers may interact with your prescription medicines or other over-the-counter (OTC) drugs. Some prescription medicines have acetaminophen and other ingredients. Using both prescription and OTC acetaminophen for pain can cause you to overdose. Read the labels on your OTC medicines with care. This will help you to clearly know the list of ingredients, how much to take, and any warnings. It may also help you not take too much acetaminophen. If you have questions or do not understand the information, ask your pharmacist or health care provider to explain it to you before you take the OTC medicine.  Managing nausea  Some people have an upset stomach after surgery. This is often because of anesthesia, pain, or pain medicine, or the stress of surgery. These tips will help you handle nausea and eat healthy foods as you get better. If you were on a special food plan before surgery, ask your doctor if you should follow it while you get better. These tips may  help:  · Do not push yourself to eat. Your body will tell you when to eat and how much.  · Start off with clear liquids and soup. They are easier to digest.  · Next try semi-solid foods, such as mashed potatoes, applesauce, and gelatin, as you feel ready.  · Slowly move to solid foods. Dont eat fatty, rich, or spicy foods at first.  · Do not force yourself to have 3 large meals a day. Instead eat smaller amounts more often.  · Take pain medicines with a small amount of solid food, such as crackers or toast, to avoid nausea.     Call your surgeon if  · You still have pain an hour after taking medicine. The medicine may not be strong enough.  · You feel too sleepy, dizzy, or groggy. The medicine may be too strong.  · You have side effects like nausea, vomiting, or skin changes, such as rash, itching, or hives.       If you have obstructive sleep apnea  You were given anesthesia medicine during surgery to keep you comfortable and free of pain. After surgery, you may have more apnea spells because of this medicine and other medicines you were given. The spells may last longer than usual.   At home:  · Keep using the continuous positive airway pressure (CPAP) device when you sleep. Unless your health care provider tells you not to, use it when you sleep, day or night. CPAP is a common device used to treat obstructive sleep apnea.  · Talk with your provider before taking any pain medicine, muscle relaxants, or sedatives. Your provider will tell you about the possible dangers of taking these medicines.  © 7375-6702 [employer]. [address] [place] [address], [place] [address]. All rights reserved. This information is not intended as a substitute for professional medical care. Always follow your healthcare professional's instructions.

## 2020-07-17 NOTE — TRANSFER OF CARE
"Anesthesia Transfer of Care Note    Patient: April C Reagan    Procedure(s) Performed: Procedure(s) (LRB):  ORIF, FRACTURE, RADIUS Revision (Left)    Patient location: PACU    Anesthesia Type: general    Transport from OR: Transported from OR on room air with adequate spontaneous ventilation    Post pain: adequate analgesia    Post assessment: no apparent anesthetic complications and tolerated procedure well    Post vital signs: stable    Level of consciousness: awake and alert    Nausea/Vomiting: no nausea/vomiting    Complications: none    Transfer of care protocol was followed      Last vitals:   Visit Vitals  BP (!) 144/95 (BP Location: Right arm, Patient Position: Lying)   Pulse 103   Temp 36.6 °C (97.9 °F) (Skin)   Resp 16   Ht 5' 2" (1.575 m)   Wt 113.9 kg (251 lb 1.7 oz)   LMP 07/02/2020   SpO2 98%   Breastfeeding No   BMI 45.93 kg/m²     "

## 2020-07-17 NOTE — DISCHARGE SUMMARY
[facility]  Discharge Note  Short Stay    Procedure(s) (LRB):  ORIF, FRACTURE, RADIUS Revision (Left)    OUTCOME: Patient tolerated treatment/procedure well without complication and is now ready for discharge.    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

## 2020-07-17 NOTE — OP NOTE
DATE OF PROCEDURE: 7/17/2020     PATIENT: Alisha Kirk     SURGEON: Willard Candelario MD.     ASSISTANT: LALI Norwood     PREOPERATIVE DIAGNOSIS:  Left radius nonunion     POSTOPERATIVE DIAGNOSIS: same     PROCEDURE:   Revision open reduction internal fixation radial nonunion     ANESTHESIA: General endotracheal.     TOURNIQUET TIME: 32 minutes.     IMPLANTS: Sage small frag set     ESTIMATED BLOOD LOSS: Minimal.     COMPLICATIONS: None.     All counts were correct at the end of the case.     BRIEF HISTORY: This is a 35 yo female who presented to my clinic complaining of persistent pain to her previous fracture site. She now notes she is under tight glycemic control. The surgical versus nonsurgical benefits and risks were discussed with patient, and they opted for surgical treatment.     PROCEDURE IN DETAIL: After the proper consents were obtained, the patient wheeled to the OR suite, placed in a supine position with the arm on an arm board. A nonsterile tourniquet was placed on the upper arm. The entire extremity was prepped and draped in a normal sterile fashion. After timeout confirmed the correct extremity, Ancef 2 grams IV was given, and TEDs and SCDS were noted to the bilateral lower extremities, old dorsal incision was used and the old approach to the radial shaft was made. Fracture site was visualized and the distal screws of the plate were removed without incidence. Old scar and callus was removed from the nonunion site. Each bone end was recannulated with a drill and a curette. Bio4 bone graft was packed into the area and a screw was placed in the compression hole on the distal plate. This was checked on fluoroscopy to be in good position. Distal cortical screws were then placed. Good compression was noted across the fracture.  Full pronation and limited supination was noted. This was equal to her previous exams. Wound was copiously irrigated with normal saline. Subcutaneous tissue was closed  with 2-0 Vicryl. Skin was closed with a stapler. Xeroform, 4 x 4's, cast padding, and Ace wrap was placed. The patient was extubated in the OR suite and wheeled to recovery in stable condition.        PLAN: They will be nonweightbearing to that extremity. I stressed the importance once again. They can begin range of motion in 3-4 days. Staples out in 2 weeks.

## 2020-07-28 RX ORDER — OXYCODONE AND ACETAMINOPHEN 10; 325 MG/1; MG/1
1 TABLET ORAL EVERY 6 HOURS PRN
Qty: 44 TABLET | Refills: 0 | Status: SHIPPED | OUTPATIENT
Start: 2020-07-28 | End: 2020-08-06 | Stop reason: SDUPTHER

## 2020-07-28 NOTE — TELEPHONE ENCOUNTER
Patient requesting refill of pain medication. Patient is s/p ORIF Revision L Radius 7/17/20. Will route to Dr. Candelario for review.

## 2020-07-29 DIAGNOSIS — Z87.81 S/P ORIF (OPEN REDUCTION INTERNAL FIXATION) FRACTURE: Primary | ICD-10-CM

## 2020-07-29 DIAGNOSIS — S52.322K: ICD-10-CM

## 2020-07-29 DIAGNOSIS — Z98.890 S/P ORIF (OPEN REDUCTION INTERNAL FIXATION) FRACTURE: Primary | ICD-10-CM

## 2020-08-04 ENCOUNTER — HOSPITAL ENCOUNTER (OUTPATIENT)
Dept: RADIOLOGY | Facility: HOSPITAL | Age: 35
Discharge: HOME | End: 2020-08-04
Attending: ORTHOPAEDIC SURGERY
Payer: COMMERCIAL

## 2020-08-04 ENCOUNTER — OFFICE VISIT (OUTPATIENT)
Dept: ORTHOPEDICS | Facility: CLINIC | Age: 35
End: 2020-08-04
Payer: COMMERCIAL

## 2020-08-04 VITALS
SYSTOLIC BLOOD PRESSURE: 108 MMHG | HEART RATE: 67 BPM | WEIGHT: 251.13 LBS | BODY MASS INDEX: 46.21 KG/M2 | HEIGHT: 62 IN | DIASTOLIC BLOOD PRESSURE: 79 MMHG

## 2020-08-04 DIAGNOSIS — S52.322K: ICD-10-CM

## 2020-08-04 DIAGNOSIS — Z98.890 S/P ORIF (OPEN REDUCTION INTERNAL FIXATION) FRACTURE: ICD-10-CM

## 2020-08-04 DIAGNOSIS — Z87.81 S/P ORIF (OPEN REDUCTION INTERNAL FIXATION) FRACTURE: ICD-10-CM

## 2020-08-04 DIAGNOSIS — S52.302K CLOSED FRACTURE OF SHAFT OF LEFT RADIUS WITH NONUNION, UNSPECIFIED FRACTURE MORPHOLOGY, SUBSEQUENT ENCOUNTER: Primary | ICD-10-CM

## 2020-08-04 PROCEDURE — 73090 X-RAY EXAM OF FOREARM: CPT | Mod: TC,PO,LT

## 2020-08-04 PROCEDURE — 99024 PR POST-OP FOLLOW-UP VISIT: ICD-10-PCS | Mod: S$GLB,,, | Performed by: ORTHOPAEDIC SURGERY

## 2020-08-04 PROCEDURE — 73090 X-RAY EXAM OF FOREARM: CPT | Mod: 26,LT,, | Performed by: RADIOLOGY

## 2020-08-04 PROCEDURE — 99999 PR PBB SHADOW E&M-EST. PATIENT-LVL IV: CPT | Mod: PBBFAC,,, | Performed by: ORTHOPAEDIC SURGERY

## 2020-08-04 PROCEDURE — 99024 POSTOP FOLLOW-UP VISIT: CPT | Mod: S$GLB,,, | Performed by: ORTHOPAEDIC SURGERY

## 2020-08-04 PROCEDURE — 73090 XR FOREARM LEFT: ICD-10-PCS | Mod: 26,LT,, | Performed by: RADIOLOGY

## 2020-08-04 PROCEDURE — 99999 PR PBB SHADOW E&M-EST. PATIENT-LVL IV: ICD-10-PCS | Mod: PBBFAC,,, | Performed by: ORTHOPAEDIC SURGERY

## 2020-08-04 RX ORDER — APREMILAST 30 MG/1
30 TABLET, FILM COATED ORAL 2 TIMES DAILY
COMMUNITY
End: 2022-05-16 | Stop reason: CLARIF

## 2020-08-04 RX ORDER — HYDROXYZINE HYDROCHLORIDE 50 MG/1
50 TABLET, FILM COATED ORAL 3 TIMES DAILY PRN
COMMUNITY
Start: 2020-07-16 | End: 2022-05-16 | Stop reason: CLARIF

## 2020-08-04 RX ORDER — CARBAMAZEPINE 200 MG/1
TABLET ORAL
COMMUNITY
Start: 2020-07-30 | End: 2022-05-16 | Stop reason: CLARIF

## 2020-08-04 NOTE — PROGRESS NOTES
Chief Complaint   Patient presents with    Left Forearm - Post-op Evaluation       HPI:  35 y.o. returns to clinic today status post  left radius revision ORIF  2 weeks ago. Pain is mild. Patient is compliant most of the time with restrictions.     Left forearm    Incision well approximated with staples. No erythema or fluctuance. Overall normal alignment. Mild point TTP about the fracture site. Decreased ROM due to stiffness. Compartments soft.  NVI distally.          X-rays were performed today, personally reviewed by me and findings discussed with the patient.  4 views of the left forearm show hardware intact in good position    Closed fracture of shaft of left radius with nonunion, unspecified fracture morphology, subsequent encounter    S/P ORIF (open reduction internal fixation) fracture        Staples removed, steristrips applied. RTC 6 weeks with repeat Xrays.

## 2020-08-06 DIAGNOSIS — Z87.81 S/P ORIF (OPEN REDUCTION INTERNAL FIXATION) FRACTURE: ICD-10-CM

## 2020-08-06 DIAGNOSIS — Z98.890 S/P ORIF (OPEN REDUCTION INTERNAL FIXATION) FRACTURE: ICD-10-CM

## 2020-08-06 RX ORDER — OXYCODONE AND ACETAMINOPHEN 10; 325 MG/1; MG/1
1 TABLET ORAL EVERY 6 HOURS PRN
Qty: 44 TABLET | Refills: 0 | Status: SHIPPED | OUTPATIENT
Start: 2020-08-06 | End: 2020-08-18

## 2020-08-16 DIAGNOSIS — Z87.81 S/P ORIF (OPEN REDUCTION INTERNAL FIXATION) FRACTURE: ICD-10-CM

## 2020-08-16 DIAGNOSIS — Z98.890 S/P ORIF (OPEN REDUCTION INTERNAL FIXATION) FRACTURE: ICD-10-CM

## 2020-08-16 RX ORDER — OXYCODONE AND ACETAMINOPHEN 10; 325 MG/1; MG/1
1 TABLET ORAL EVERY 6 HOURS PRN
Qty: 44 TABLET | Refills: 0 | Status: CANCELLED | OUTPATIENT
Start: 2020-08-16

## 2020-08-18 DIAGNOSIS — Z98.890 S/P ORIF (OPEN REDUCTION INTERNAL FIXATION) FRACTURE: ICD-10-CM

## 2020-08-18 DIAGNOSIS — Z87.81 S/P ORIF (OPEN REDUCTION INTERNAL FIXATION) FRACTURE: ICD-10-CM

## 2020-08-18 RX ORDER — OXYCODONE AND ACETAMINOPHEN 5; 325 MG/1; MG/1
1 TABLET ORAL EVERY 6 HOURS PRN
Qty: 33 TABLET | Refills: 0 | Status: SHIPPED | OUTPATIENT
Start: 2020-08-18 | End: 2020-08-25 | Stop reason: SDUPTHER

## 2020-08-18 RX ORDER — OXYCODONE AND ACETAMINOPHEN 10; 325 MG/1; MG/1
1 TABLET ORAL EVERY 6 HOURS PRN
Qty: 44 TABLET | Refills: 0 | Status: CANCELLED | OUTPATIENT
Start: 2020-08-18

## 2020-08-23 ENCOUNTER — PATIENT MESSAGE (OUTPATIENT)
Dept: ORTHOPEDICS | Facility: CLINIC | Age: 35
End: 2020-08-23

## 2020-08-24 ENCOUNTER — PATIENT MESSAGE (OUTPATIENT)
Dept: ORTHOPEDICS | Facility: CLINIC | Age: 35
End: 2020-08-24

## 2020-08-24 DIAGNOSIS — Z87.81 S/P ORIF (OPEN REDUCTION INTERNAL FIXATION) FRACTURE: Primary | ICD-10-CM

## 2020-08-24 DIAGNOSIS — Z98.890 S/P ORIF (OPEN REDUCTION INTERNAL FIXATION) FRACTURE: Primary | ICD-10-CM

## 2020-08-24 DIAGNOSIS — S52.302K CLOSED FRACTURE OF SHAFT OF LEFT RADIUS WITH NONUNION, UNSPECIFIED FRACTURE MORPHOLOGY, SUBSEQUENT ENCOUNTER: ICD-10-CM

## 2020-08-25 ENCOUNTER — OFFICE VISIT (OUTPATIENT)
Dept: ORTHOPEDICS | Facility: CLINIC | Age: 35
End: 2020-08-25
Payer: COMMERCIAL

## 2020-08-25 ENCOUNTER — HOSPITAL ENCOUNTER (OUTPATIENT)
Dept: RADIOLOGY | Facility: HOSPITAL | Age: 35
Discharge: HOME | End: 2020-08-25
Attending: ORTHOPAEDIC SURGERY
Payer: COMMERCIAL

## 2020-08-25 VITALS
DIASTOLIC BLOOD PRESSURE: 84 MMHG | WEIGHT: 251 LBS | HEIGHT: 62 IN | BODY MASS INDEX: 46.19 KG/M2 | SYSTOLIC BLOOD PRESSURE: 120 MMHG | HEART RATE: 64 BPM

## 2020-08-25 DIAGNOSIS — Z98.890 S/P ORIF (OPEN REDUCTION INTERNAL FIXATION) FRACTURE: ICD-10-CM

## 2020-08-25 DIAGNOSIS — S52.302K CLOSED FRACTURE OF SHAFT OF LEFT RADIUS WITH NONUNION, UNSPECIFIED FRACTURE MORPHOLOGY, SUBSEQUENT ENCOUNTER: ICD-10-CM

## 2020-08-25 DIAGNOSIS — S52.322K: ICD-10-CM

## 2020-08-25 DIAGNOSIS — Z87.81 S/P ORIF (OPEN REDUCTION INTERNAL FIXATION) FRACTURE: ICD-10-CM

## 2020-08-25 DIAGNOSIS — G56.42 COMPLEX REGIONAL PAIN SYNDROME TYPE 2 OF LEFT UPPER EXTREMITY: Primary | ICD-10-CM

## 2020-08-25 DIAGNOSIS — M77.8 LEFT WRIST TENDONITIS: Primary | ICD-10-CM

## 2020-08-25 PROCEDURE — 99999 PR PBB SHADOW E&M-EST. PATIENT-LVL IV: CPT | Mod: PBBFAC,,, | Performed by: ORTHOPAEDIC SURGERY

## 2020-08-25 PROCEDURE — 99999 PR PBB SHADOW E&M-EST. PATIENT-LVL IV: ICD-10-PCS | Mod: PBBFAC,,, | Performed by: ORTHOPAEDIC SURGERY

## 2020-08-25 PROCEDURE — 99214 OFFICE O/P EST MOD 30 MIN: CPT | Mod: 24,25,S$GLB, | Performed by: ORTHOPAEDIC SURGERY

## 2020-08-25 PROCEDURE — 20550 TENDON SHEATH: ICD-10-PCS | Mod: 79,LT,S$GLB, | Performed by: ORTHOPAEDIC SURGERY

## 2020-08-25 PROCEDURE — 73090 XR FOREARM LEFT: ICD-10-PCS | Mod: 26,LT,, | Performed by: RADIOLOGY

## 2020-08-25 PROCEDURE — 3008F BODY MASS INDEX DOCD: CPT | Mod: CPTII,S$GLB,, | Performed by: ORTHOPAEDIC SURGERY

## 2020-08-25 PROCEDURE — 20550 NJX 1 TENDON SHEATH/LIGAMENT: CPT | Mod: 79,LT,S$GLB, | Performed by: ORTHOPAEDIC SURGERY

## 2020-08-25 PROCEDURE — 73090 X-RAY EXAM OF FOREARM: CPT | Mod: TC,PO,LT

## 2020-08-25 PROCEDURE — 99214 PR OFFICE/OUTPT VISIT, EST, LEVL IV, 30-39 MIN: ICD-10-PCS | Mod: 24,25,S$GLB, | Performed by: ORTHOPAEDIC SURGERY

## 2020-08-25 PROCEDURE — 73090 X-RAY EXAM OF FOREARM: CPT | Mod: 26,LT,, | Performed by: RADIOLOGY

## 2020-08-25 PROCEDURE — 3008F PR BODY MASS INDEX (BMI) DOCUMENTED: ICD-10-PCS | Mod: CPTII,S$GLB,, | Performed by: ORTHOPAEDIC SURGERY

## 2020-08-25 PROCEDURE — 99024 POSTOP FOLLOW-UP VISIT: CPT | Mod: ,,, | Performed by: ORTHOPAEDIC SURGERY

## 2020-08-25 PROCEDURE — 99024 PR POST-OP FOLLOW-UP VISIT: ICD-10-PCS | Mod: ,,, | Performed by: ORTHOPAEDIC SURGERY

## 2020-08-25 RX ORDER — GABAPENTIN 600 MG/1
600 TABLET ORAL 3 TIMES DAILY
Qty: 90 TABLET | Refills: 2 | OUTPATIENT
Start: 2020-08-25 | End: 2020-11-23

## 2020-08-25 RX ORDER — CARIPRAZINE 1.5 MG/1
1 CAPSULE, GELATIN COATED ORAL EVERY MORNING
COMMUNITY
Start: 2020-08-13 | End: 2022-03-08

## 2020-08-25 RX ORDER — OXYCODONE AND ACETAMINOPHEN 5; 325 MG/1; MG/1
1 TABLET ORAL EVERY 6 HOURS PRN
Qty: 33 TABLET | Refills: 0 | Status: ON HOLD | OUTPATIENT
Start: 2020-08-25 | End: 2021-07-09 | Stop reason: HOSPADM

## 2020-08-25 RX ADMIN — TRIAMCINOLONE ACETONIDE 40 MG: 40 INJECTION, SUSPENSION INTRA-ARTICULAR; INTRAMUSCULAR at 09:08

## 2020-08-25 NOTE — PROGRESS NOTES
Chief Complaint   Patient presents with    Left Forearm - Pain, Post-op Evaluation       HPI:   This is a 35 y.o. who returns today status post left radius revision ORIF 6 weeks ago. Pain is mild, with increased pain with gripping. Patient is compliant most of the time with restrictions.        Past Medical History:   Diagnosis Date    Anxiety     Depression     Diabetes mellitus     Environmental allergies     Hyperlipidemia     Hypertension     Migraine     Seizures     last reported 2019    Thyroid disease      Past Surgical History:   Procedure Laterality Date    ABDOMINAL SURGERY          INJECTION OF ANESTHETIC AGENT AROUND NERVE Left 2019    Procedure: Block, Nerve STELLATE GANGLION;  Surgeon: Tree Tapia MD;  Location: [facility] OR;  Service: Pain Management;  Laterality: Left;    OPEN REDUCTION AND INTERNAL FIXATION (ORIF) OF FRACTURE OF RADIUS Left 2019    Procedure: ORIF, FRACTURE, RADIAL SHAFT;  Surgeon: Ravi Panchal MD;  Location: [facility] OR;  Service: Orthopedics;  Laterality: Left;    OPEN REDUCTION AND INTERNAL FIXATION (ORIF) OF FRACTURE OF RADIUS Left 2019    Procedure: ORIF, FRACTURE, RADIUS;  Surgeon: Willard Candelario MD;  Location: [facility] OR;  Service: Orthopedics;  Laterality: Left;   REVISION ORIF RADIUS  General with block    OPEN REDUCTION AND INTERNAL FIXATION (ORIF) OF FRACTURE OF RADIUS Left 2019    Procedure: Revision ORIF, FRACTURE, RADIAL SHAFT;  Surgeon: Willard Candelario MD;  Location: [facility] OR;  Service: Orthopedics;  Laterality: Left;    OPEN REDUCTION AND INTERNAL FIXATION (ORIF) OF FRACTURE OF RADIUS Left 2020    Procedure: ORIF, FRACTURE, RADIUS Revision;  Surgeon: Willard Candelario MD;  Location: [facility] OR;  Service: Orthopedics;  Laterality: Left;     Current Outpatient Medications on File Prior to Visit   Medication Sig Dispense Refill    apremilast (OTEZLA) 30 mg Tab Take 30 mg by mouth 2 (two) times daily.      atorvastatin  (LIPITOR) 20 MG tablet TAKE ONE TABLET BY MOUTH AT BEDTIME; Instr DOSE increased TO 20mg, discontinue 10mg  3    carBAMazepine (TEGRETOL) 200 mg tablet       cetirizine (ZYRTEC) 10 MG tablet Take 10 mg by mouth once daily.      ergocalciferol (ERGOCALCIFEROL) 50,000 unit Cap Take 50,000 Units by mouth every 7 days.  0    gabapentin (NEURONTIN) 600 MG tablet Take 1 tablet (600 mg total) by mouth 3 (three) times daily. 90 tablet 2    hydrocortisone 2.5 % cream APPLY ONE GRAM TO THE AFFECTED AREA(S) TWICE DAILY  3    hydrOXYzine (ATARAX) 50 MG tablet       JARDIANCE 10 mg Tab TAKE ONE TABLET BY MOUTH EVERY DAY IN THE MORNING STOP TRADJENTA  1    ketoconazole (NIZORAL) 2 % cream APPLY ONE GRAM TO AFFECTED AREA(S) TWICE DAILY FOR 5-7 DAYS  3    ketoconazole (NIZORAL) 2 % shampoo APPLY ONE APPLICATION TO SCALP IN SHOWER 2-3 TIMES WEEKLY  3    levothyroxine (SYNTHROID) 50 MCG tablet Take 50 mcg by mouth once daily.  2    methocarbamoL (ROBAXIN) 500 MG Tab Take 1 tablet (500 mg total) by mouth 4 (four) times daily as needed. 44 tablet 0    metoprolol tartrate (LOPRESSOR) 50 MG tablet Take 50 mg by mouth 2 (two) times daily.      montelukast (SINGULAIR) 10 mg tablet Take 10 mg by mouth every evening.  2    OXcarbazepine (TRILEPTAL) 600 MG Tab Take 150 mg by mouth 2 (two) times daily.      SLOW RELEASE IRON 160 mg (50 mg iron) TbSR 160 mg once daily.   0    TRUE METRIX GLUCOSE METER Misc USE AS DIRECTED TO check blood glucose FIVE TIMES DAILY  0    TRUE METRIX GLUCOSE TEST STRIP Strp       TRUEPLUS LANCETS 30 gauge Misc USE TO CHECK BLOOD SUGAR FIVE TIMES DAILY  5    VRAYLAR 1.5 mg Cap Take 1 capsule by mouth every morning.      [DISCONTINUED] oxyCODONE-acetaminophen (PERCOCET) 5-325 mg per tablet Take 1 tablet by mouth every 6 (six) hours as needed for Pain. 33 tablet 0    [DISCONTINUED] methocarbamoL (ROBAXIN) 750 MG Tab Take 1 tablet (750 mg total) by mouth 4 (four) times daily as needed. (Patient not  taking: Reported on 2020) 44 tablet 0     Current Facility-Administered Medications on File Prior to Visit   Medication Dose Route Frequency Provider Last Rate Last Dose    electrolyte-S (ISOLYTE)   Intravenous Continuous Ritesh Adkins MD        lactated ringers infusion   Intravenous Continuous Rietsh Adkins MD 10 mL/hr at 20 0643      lidocaine (PF) 10 mg/ml (1%) injection 10 mg  1 mL Intradermal Once Ritesh Adkins MD        sodium chloride 0.9% flush 3 mL  3 mL Intravenous Q6H PRN Willard Candelario MD        sodium chloride 0.9% flush 3 mL  3 mL Intravenous Q6H PRN iWllard Candelario MD         Review of patient's allergies indicates:   Allergen Reactions    Keppra [levetiracetam]      Makes aggressive    Tramadol      Seizures    Xanax [alprazolam] Other (See Comments)     Makes aggressive     History reviewed. No pertinent family history.  Social History     Socioeconomic History    Marital status:      Spouse name: Not on file    Number of children: Not on file    Years of education: Not on file    Highest education level: Not on file   Occupational History    Not on file   Social Needs    Financial resource strain: Not on file    Food insecurity     Worry: Not on file     Inability: Not on file    Transportation needs     Medical: Not on file     Non-medical: Not on file   Tobacco Use    Smoking status: Former Smoker     Packs/day: 0.50     Years: 3.00     Pack years: 1.50     Types: Cigarettes     Quit date:      Years since quittin.6    Smokeless tobacco: Never Used   Substance and Sexual Activity    Alcohol use: Yes     Comment: One glass of wine once a month    Drug use: No    Sexual activity: Yes     Partners: Male     Birth control/protection: None   Lifestyle    Physical activity     Days per week: Not on file     Minutes per session: Not on file    Stress: Not on file   Relationships    Social connections     Talks on phone: Not on file     Gets  together: Not on file     Attends Orthodox service: Not on file     Active member of club or organization: Not on file     Attends meetings of clubs or organizations: Not on file     Relationship status: Not on file   Other Topics Concern    Not on file   Social History Narrative    Not on file       Review of Systems:  Constitutional:  Denies fever or chills   Eyes:  Denies change in visual acuity   HENT:  Denies nasal congestion or sore throat   Respiratory:  Denies cough or shortness of breath   Cardiovascular:  Denies chest pain or edema   GI:  Denies abdominal pain, nausea, vomiting, bloody stools or diarrhea   :  Denies dysuria   Integument:  Denies rash   Neurologic:  Denies headache, focal weakness or sensory changes   Endocrine:  Denies polyuria or polydipsia   Lymphatic:  Denies swollen glands   Psychiatric:  Denies depression or anxiety     Physical Exam:   Constitutional:  Well developed, well nourished, no acute distress, non-toxic appearance   Integument:  Well hydrated, no rash   Lymphatic:  No lymphadenopathy noted   Neurologic:  Alert & oriented x 3  Psychiatric:  Speech and behavior appropriate   Gi: abdomen soft  Eyes: EOMI       Left forearm  Incision healed. No erythema or fluctuance. Overall normal alignment. Mild point TTP about the fracture site. Decreased ROM due to stiffness. Compartments soft.  NVI distally. Pain about the ulnar tendons at wrist with passive stretch.               X-rays were performed today, personally reviewed by me and findings discussed with the patient.  4 views of the left forearm show hardware intact in good position        Left wrist tendonitis  -     Tendon Sheath    S/P ORIF (open reduction internal fixation) fracture    Closed fracture of shaft of left radius with nonunion, unspecified fracture morphology, subsequent encounter          Using an aseptic technique, I injected 0.5 cc of lidocaine 1% without and 0.5 cc of kenalog 40mg into the left ulnar wrist.  The patient tolerated this well. I will have them return to clinic in 6 weeks.

## 2020-09-01 DIAGNOSIS — Z87.81 S/P ORIF (OPEN REDUCTION INTERNAL FIXATION) FRACTURE: Primary | ICD-10-CM

## 2020-09-01 DIAGNOSIS — Z98.890 S/P ORIF (OPEN REDUCTION INTERNAL FIXATION) FRACTURE: Primary | ICD-10-CM

## 2020-09-01 RX ORDER — OXYCODONE AND ACETAMINOPHEN 5; 325 MG/1; MG/1
1 TABLET ORAL EVERY 6 HOURS PRN
Qty: 33 TABLET | Refills: 0 | Status: CANCELLED | OUTPATIENT
Start: 2020-09-01

## 2020-09-01 RX ORDER — HYDROCODONE BITARTRATE AND ACETAMINOPHEN 10; 325 MG/1; MG/1
1 TABLET ORAL EVERY 6 HOURS PRN
Qty: 33 TABLET | Refills: 0 | Status: SHIPPED | OUTPATIENT
Start: 2020-09-01 | End: 2020-09-08 | Stop reason: SDUPTHER

## 2020-09-08 DIAGNOSIS — Z98.890 S/P ORIF (OPEN REDUCTION INTERNAL FIXATION) FRACTURE: ICD-10-CM

## 2020-09-08 DIAGNOSIS — Z87.81 S/P ORIF (OPEN REDUCTION INTERNAL FIXATION) FRACTURE: ICD-10-CM

## 2020-09-09 RX ORDER — HYDROCODONE BITARTRATE AND ACETAMINOPHEN 10; 325 MG/1; MG/1
1 TABLET ORAL EVERY 6 HOURS PRN
Qty: 33 TABLET | Refills: 0 | Status: SHIPPED | OUTPATIENT
Start: 2020-09-09 | End: 2020-09-16 | Stop reason: SDUPTHER

## 2020-09-10 RX ORDER — TRIAMCINOLONE ACETONIDE 40 MG/ML
40 INJECTION, SUSPENSION INTRA-ARTICULAR; INTRAMUSCULAR
Status: DISCONTINUED | OUTPATIENT
Start: 2020-08-25 | End: 2020-09-10 | Stop reason: HOSPADM

## 2020-09-16 DIAGNOSIS — Z87.81 S/P ORIF (OPEN REDUCTION INTERNAL FIXATION) FRACTURE: ICD-10-CM

## 2020-09-16 DIAGNOSIS — Z98.890 S/P ORIF (OPEN REDUCTION INTERNAL FIXATION) FRACTURE: ICD-10-CM

## 2020-09-16 NOTE — TELEPHONE ENCOUNTER
Message to pt that Dr. Candelario and Ms. Lawson,APRN,NPC are out of the office until Tuesday. Thanks, Lennie

## 2020-09-22 RX ORDER — HYDROCODONE BITARTRATE AND ACETAMINOPHEN 10; 325 MG/1; MG/1
1 TABLET ORAL EVERY 6 HOURS PRN
Qty: 33 TABLET | Refills: 0 | Status: SHIPPED | OUTPATIENT
Start: 2020-09-22 | End: 2020-09-30

## 2020-09-30 DIAGNOSIS — Z98.890 S/P ORIF (OPEN REDUCTION INTERNAL FIXATION) FRACTURE: ICD-10-CM

## 2020-09-30 DIAGNOSIS — Z87.81 S/P ORIF (OPEN REDUCTION INTERNAL FIXATION) FRACTURE: ICD-10-CM

## 2020-09-30 RX ORDER — HYDROCODONE BITARTRATE AND ACETAMINOPHEN 10; 325 MG/1; MG/1
1 TABLET ORAL EVERY 6 HOURS PRN
Qty: 33 TABLET | Refills: 0 | Status: CANCELLED | OUTPATIENT
Start: 2020-09-30

## 2020-09-30 RX ORDER — HYDROCODONE BITARTRATE AND ACETAMINOPHEN 5; 325 MG/1; MG/1
1 TABLET ORAL EVERY 6 HOURS PRN
Qty: 33 TABLET | Refills: 0 | Status: ON HOLD | OUTPATIENT
Start: 2020-09-30 | End: 2021-07-09 | Stop reason: HOSPADM

## 2020-10-05 DIAGNOSIS — Z98.890 S/P ORIF (OPEN REDUCTION INTERNAL FIXATION) FRACTURE: Primary | ICD-10-CM

## 2020-10-05 DIAGNOSIS — Z87.81 S/P ORIF (OPEN REDUCTION INTERNAL FIXATION) FRACTURE: Primary | ICD-10-CM

## 2020-10-06 ENCOUNTER — HOSPITAL ENCOUNTER (OUTPATIENT)
Dept: RADIOLOGY | Facility: HOSPITAL | Age: 35
Discharge: HOME | End: 2020-10-06
Attending: NURSE PRACTITIONER
Payer: COMMERCIAL

## 2020-10-06 ENCOUNTER — OFFICE VISIT (OUTPATIENT)
Dept: ORTHOPEDICS | Facility: CLINIC | Age: 35
End: 2020-10-06
Payer: COMMERCIAL

## 2020-10-06 VITALS
BODY MASS INDEX: 46.21 KG/M2 | DIASTOLIC BLOOD PRESSURE: 99 MMHG | WEIGHT: 251.13 LBS | HEART RATE: 75 BPM | SYSTOLIC BLOOD PRESSURE: 153 MMHG | HEIGHT: 62 IN

## 2020-10-06 DIAGNOSIS — Z87.81 S/P ORIF (OPEN REDUCTION INTERNAL FIXATION) FRACTURE: Primary | ICD-10-CM

## 2020-10-06 DIAGNOSIS — Z87.81 S/P ORIF (OPEN REDUCTION INTERNAL FIXATION) FRACTURE: ICD-10-CM

## 2020-10-06 DIAGNOSIS — Z98.890 S/P ORIF (OPEN REDUCTION INTERNAL FIXATION) FRACTURE: Primary | ICD-10-CM

## 2020-10-06 DIAGNOSIS — Z98.890 S/P ORIF (OPEN REDUCTION INTERNAL FIXATION) FRACTURE: ICD-10-CM

## 2020-10-06 DIAGNOSIS — M77.8 LEFT WRIST TENDINITIS: ICD-10-CM

## 2020-10-06 PROCEDURE — 99214 OFFICE O/P EST MOD 30 MIN: CPT | Mod: 24,25,S$GLB, | Performed by: ORTHOPAEDIC SURGERY

## 2020-10-06 PROCEDURE — 20550 NJX 1 TENDON SHEATH/LIGAMENT: CPT | Mod: 79,LT,S$GLB, | Performed by: ORTHOPAEDIC SURGERY

## 2020-10-06 PROCEDURE — 3008F PR BODY MASS INDEX (BMI) DOCUMENTED: ICD-10-PCS | Mod: CPTII,S$GLB,, | Performed by: ORTHOPAEDIC SURGERY

## 2020-10-06 PROCEDURE — 99214 PR OFFICE/OUTPT VISIT, EST, LEVL IV, 30-39 MIN: ICD-10-PCS | Mod: 24,25,S$GLB, | Performed by: ORTHOPAEDIC SURGERY

## 2020-10-06 PROCEDURE — 99999 PR PBB SHADOW E&M-EST. PATIENT-LVL III: ICD-10-PCS | Mod: PBBFAC,,, | Performed by: ORTHOPAEDIC SURGERY

## 2020-10-06 PROCEDURE — 73090 X-RAY EXAM OF FOREARM: CPT | Mod: TC,PO,LT

## 2020-10-06 PROCEDURE — 73090 X-RAY EXAM OF FOREARM: CPT | Mod: 26,LT,, | Performed by: RADIOLOGY

## 2020-10-06 PROCEDURE — 99999 PR PBB SHADOW E&M-EST. PATIENT-LVL III: CPT | Mod: PBBFAC,,, | Performed by: ORTHOPAEDIC SURGERY

## 2020-10-06 PROCEDURE — 73090 XR FOREARM LEFT: ICD-10-PCS | Mod: 26,LT,, | Performed by: RADIOLOGY

## 2020-10-06 PROCEDURE — 20550 TENDON SHEATH: ICD-10-PCS | Mod: 79,LT,S$GLB, | Performed by: ORTHOPAEDIC SURGERY

## 2020-10-06 PROCEDURE — 3008F BODY MASS INDEX DOCD: CPT | Mod: CPTII,S$GLB,, | Performed by: ORTHOPAEDIC SURGERY

## 2020-10-06 RX ORDER — CALCITONIN SALMON 200 [IU]/.09ML
1 SPRAY, METERED NASAL DAILY
COMMUNITY
Start: 2020-09-30 | End: 2022-05-16 | Stop reason: CLARIF

## 2020-10-06 RX ORDER — EMPAGLIFLOZIN 25 MG/1
25 TABLET, FILM COATED ORAL DAILY
COMMUNITY
Start: 2020-09-08 | End: 2022-05-16 | Stop reason: CLARIF

## 2020-10-06 RX ORDER — TERCONAZOLE 4 MG/G
CREAM VAGINAL
COMMUNITY
Start: 2020-09-29 | End: 2022-05-16 | Stop reason: CLARIF

## 2020-10-06 RX ORDER — HYDROCODONE BITARTRATE AND ACETAMINOPHEN 5; 325 MG/1; MG/1
1 TABLET ORAL EVERY 6 HOURS PRN
Qty: 33 TABLET | Refills: 0 | Status: CANCELLED | OUTPATIENT
Start: 2020-10-06

## 2020-10-06 RX ORDER — ZOLPIDEM TARTRATE 5 MG/1
5 TABLET ORAL NIGHTLY PRN
COMMUNITY
Start: 2020-09-23 | End: 2022-03-08

## 2020-10-06 RX ADMIN — TRIAMCINOLONE ACETONIDE 40 MG: 40 INJECTION, SUSPENSION INTRA-ARTICULAR; INTRAMUSCULAR at 09:10

## 2020-10-06 NOTE — TELEPHONE ENCOUNTER
Attempted to call patient. Left voicemail. RX not refilled due to her being out of post op window. Will refer to pain management.

## 2020-10-06 NOTE — PROGRESS NOTES
Chief Complaint   Patient presents with    Post-op Evaluation     s/p left radius ORIF revision 2020       HPI:    This is a 35 y.o. who returns today status post left radius revision ORIF 3 months ago. Pain is mild, with increased pain with gripping. Patient is compliant most of the time with restrictions.           Past Medical History:   Diagnosis Date    Anxiety     Depression     Diabetes mellitus     Environmental allergies     Hyperlipidemia     Hypertension     Migraine     Seizures     last reported 2019    Thyroid disease       Past Surgical History:   Procedure Laterality Date    ABDOMINAL SURGERY          INJECTION OF ANESTHETIC AGENT AROUND NERVE Left 2019    Procedure: Block, Nerve STELLATE GANGLION;  Surgeon: Tree Tapia MD;  Location: [facility] OR;  Service: Pain Management;  Laterality: Left;    OPEN REDUCTION AND INTERNAL FIXATION (ORIF) OF FRACTURE OF RADIUS Left 2019    Procedure: ORIF, FRACTURE, RADIAL SHAFT;  Surgeon: Ravi Panchal MD;  Location: [facility] OR;  Service: Orthopedics;  Laterality: Left;    OPEN REDUCTION AND INTERNAL FIXATION (ORIF) OF FRACTURE OF RADIUS Left 2019    Procedure: ORIF, FRACTURE, RADIUS;  Surgeon: Willard Candelario MD;  Location: [facility] OR;  Service: Orthopedics;  Laterality: Left;   REVISION ORIF RADIUS  General with block    OPEN REDUCTION AND INTERNAL FIXATION (ORIF) OF FRACTURE OF RADIUS Left 2019    Procedure: Revision ORIF, FRACTURE, RADIAL SHAFT;  Surgeon: Willard Candelario MD;  Location: [facility] OR;  Service: Orthopedics;  Laterality: Left;    OPEN REDUCTION AND INTERNAL FIXATION (ORIF) OF FRACTURE OF RADIUS Left 2020    Procedure: ORIF, FRACTURE, RADIUS Revision;  Surgeon: Willard Candelario MD;  Location: [facility] OR;  Service: Orthopedics;  Laterality: Left;      Current Outpatient Medications on File Prior to Visit   Medication Sig Dispense Refill    atorvastatin (LIPITOR) 20 MG tablet TAKE ONE TABLET BY  MOUTH AT BEDTIME; Instr DOSE increased TO 20mg, discontinue 10mg  3    calcitonin, salmon, (FORTICAL) 200 unit/actuation nasal spray USE ONE SPRAY DAILY - alternate nostril daily      carBAMazepine (TEGRETOL) 200 mg tablet       ergocalciferol (ERGOCALCIFEROL) 50,000 unit Cap Take 50,000 Units by mouth every 7 days.  0    HYDROcodone-acetaminophen (NORCO) 5-325 mg per tablet Take 1 tablet by mouth every 6 (six) hours as needed (severe post operative pain). 33 tablet 0    hydrOXYzine (ATARAX) 50 MG tablet       JARDIANCE 25 mg tablet TAKE ONE TABLET BY MOUTH EVERY MORNING. discontinue THE 10 MG.      levothyroxine (SYNTHROID) 50 MCG tablet Take 50 mcg by mouth once daily.  2    metoprolol tartrate (LOPRESSOR) 50 MG tablet Take 50 mg by mouth 2 (two) times daily.      montelukast (SINGULAIR) 10 mg tablet Take 10 mg by mouth every evening.  2    OXcarbazepine (TRILEPTAL) 600 MG Tab Take 150 mg by mouth 2 (two) times daily.      terconazole (TERAZOL 7) 0.4 % Crea USE ONE APPLICATORFUL VAGINALLY daily AT BEDTIME FOR 7 DAYS      TRUE METRIX GLUCOSE METER Misc USE AS DIRECTED TO check blood glucose FIVE TIMES DAILY  0    TRUE METRIX GLUCOSE TEST STRIP Strp       TRUEPLUS LANCETS 30 gauge Misc USE TO CHECK BLOOD SUGAR FIVE TIMES DAILY  5    VRAYLAR 1.5 mg Cap Take 1 capsule by mouth every morning.      zolpidem (AMBIEN) 5 MG Tab Take 5 mg by mouth nightly as needed.      apremilast (OTEZLA) 30 mg Tab Take 30 mg by mouth 2 (two) times daily.      cetirizine (ZYRTEC) 10 MG tablet Take 10 mg by mouth once daily.      gabapentin (NEURONTIN) 600 MG tablet Take 1 tablet (600 mg total) by mouth 3 (three) times daily. 90 tablet 2    hydrocortisone 2.5 % cream APPLY ONE GRAM TO THE AFFECTED AREA(S) TWICE DAILY  3    JARDIANCE 10 mg Tab TAKE ONE TABLET BY MOUTH EVERY DAY IN THE MORNING STOP TRADJENTA  1    ketoconazole (NIZORAL) 2 % cream APPLY ONE GRAM TO AFFECTED AREA(S) TWICE DAILY FOR 5-7 DAYS  3     ketoconazole (NIZORAL) 2 % shampoo APPLY ONE APPLICATION TO SCALP IN SHOWER 2-3 TIMES WEEKLY  3    methocarbamoL (ROBAXIN) 500 MG Tab Take 1 tablet (500 mg total) by mouth 4 (four) times daily as needed. 44 tablet 0    oxyCODONE-acetaminophen (PERCOCET) 5-325 mg per tablet Take 1 tablet by mouth every 6 (six) hours as needed for Pain. 33 tablet 0    SLOW RELEASE IRON 160 mg (50 mg iron) TbSR 160 mg once daily.   0     Current Facility-Administered Medications on File Prior to Visit   Medication Dose Route Frequency Provider Last Rate Last Dose    electrolyte-S (ISOLYTE)   Intravenous Continuous Ritesh Adkins MD        lactated ringers infusion   Intravenous Continuous Ritesh Adkins MD 10 mL/hr at 20 0643      lidocaine (PF) 10 mg/ml (1%) injection 10 mg  1 mL Intradermal Once Ritesh Adkins MD        sodium chloride 0.9% flush 3 mL  3 mL Intravenous Q6H PRN Willard Candelario MD        sodium chloride 0.9% flush 3 mL  3 mL Intravenous Q6H PRN Willard Candelario MD          Review of patient's allergies indicates:   Allergen Reactions    Keppra [levetiracetam]      Makes aggressive    Tramadol      Seizures    Xanax [alprazolam] Other (See Comments)     Makes aggressive      Family History not pertinent   Social History     Socioeconomic History    Marital status:      Spouse name: Not on file    Number of children: Not on file    Years of education: Not on file    Highest education level: Not on file   Occupational History    Not on file   Social Needs    Financial resource strain: Not on file    Food insecurity     Worry: Not on file     Inability: Not on file    Transportation needs     Medical: Not on file     Non-medical: Not on file   Tobacco Use    Smoking status: Former Smoker     Packs/day: 0.50     Years: 3.00     Pack years: 1.50     Types: Cigarettes     Quit date:      Years since quittin.7    Smokeless tobacco: Never Used   Substance and Sexual Activity     Alcohol use: Yes     Comment: One glass of wine once a month    Drug use: No    Sexual activity: Yes     Partners: Male     Birth control/protection: None   Lifestyle    Physical activity     Days per week: Not on file     Minutes per session: Not on file    Stress: Not on file   Relationships    Social connections     Talks on phone: Not on file     Gets together: Not on file     Attends Orthodoxy service: Not on file     Active member of club or organization: Not on file     Attends meetings of clubs or organizations: Not on file     Relationship status: Not on file   Other Topics Concern    Not on file   Social History Narrative    Not on file         Review of Systems:   Constitutional:  Denies fever or chills    Eyes:  Denies change in visual acuity    HENT:  Denies nasal congestion or sore throat    Respiratory:  Denies cough or shortness of breath    Cardiovascular:  Denies chest pain or edema    GI:  Denies abdominal pain, nausea, vomiting, bloody stools or diarrhea    :  Denies dysuria    Integument:  Denies rash    Neurologic:  Denies headache, focal weakness or sensory changes    Endocrine:  Denies polyuria or polydipsia    Lymphatic:  Denies swollen glands    Psychiatric:  Denies depression or anxiety       Physical Exam:    Constitutional:  Well developed, well nourished, no acute distress, non-toxic appearance    Integument:  Well hydrated, no rash    Lymphatic:  No lymphadenopathy noted    Neurologic:  Alert & oriented x 3,     Psychiatric:  Speech and behavior appropriate    Gi: abdomen soft    Right forearm  Exam performed same as contralateral side and is normal        Left forearm  Incision healed. No erythema or fluctuance. Overall normal alignment. No TTP about the fracture site. Pain about the ulnar tendons at wrist with passive stretch.Compartments soft.  NVI distally.       X-rays were performed today, personally reviewed by me and findings discussed with the patient.  4 views of the  left forearm show hardware intact in good position          Left wrist tendinitis  -     Tendon Sheath           Using an aseptic technique, I injected 0.5 cc of lidocaine 1% without and 0.5 cc of kenalog 40mg into the left ulnar wrist. The patient tolerated this well. I will have them return to clinic in 3 months.

## 2020-10-06 NOTE — PROCEDURES
Tendon Sheath    Date/Time: 10/6/2020 9:30 AM  Performed by: Willard Candelario MD  Authorized by: Willard Candelario MD     Consent Done?:  Yes (Verbal)  Indications:  Pain  Site marked: the procedure site was marked    Timeout: prior to procedure the correct patient, procedure, and site was verified    Local anesthesia used?: Yes    Anesthesia:  Local infiltration  Local anesthetic:  Lidocaine 1% without epinephrine  Anesthetic total (ml):  0.5    Location:  Wrist  Needle size:  25 G  Approach:  Ulnar  Medications:  40 mg triamcinolone acetonide 40 mg/mL (20mg injected )

## 2020-10-06 NOTE — TELEPHONE ENCOUNTER
DOS 07/17/2020- 3 months post op. Will refer to pain management if she requires further pain control.

## 2020-10-20 RX ORDER — TRIAMCINOLONE ACETONIDE 40 MG/ML
40 INJECTION, SUSPENSION INTRA-ARTICULAR; INTRAMUSCULAR
Status: DISCONTINUED | OUTPATIENT
Start: 2020-10-06 | End: 2020-10-20 | Stop reason: HOSPADM

## 2020-12-31 DIAGNOSIS — Z98.890 S/P ORIF (OPEN REDUCTION INTERNAL FIXATION) FRACTURE: Primary | ICD-10-CM

## 2020-12-31 DIAGNOSIS — Z87.81 S/P ORIF (OPEN REDUCTION INTERNAL FIXATION) FRACTURE: Primary | ICD-10-CM

## 2021-01-08 DIAGNOSIS — Z87.81 S/P ORIF (OPEN REDUCTION INTERNAL FIXATION) FRACTURE: Primary | ICD-10-CM

## 2021-01-08 DIAGNOSIS — Z98.890 S/P ORIF (OPEN REDUCTION INTERNAL FIXATION) FRACTURE: Primary | ICD-10-CM

## 2021-01-14 ENCOUNTER — HOSPITAL ENCOUNTER (OUTPATIENT)
Dept: RADIOLOGY | Facility: HOSPITAL | Age: 36
Discharge: HOME | End: 2021-01-14
Attending: ORTHOPAEDIC SURGERY
Payer: COMMERCIAL

## 2021-01-14 ENCOUNTER — OFFICE VISIT (OUTPATIENT)
Dept: ORTHOPEDICS | Facility: CLINIC | Age: 36
End: 2021-01-14
Payer: COMMERCIAL

## 2021-01-14 VITALS — HEART RATE: 82 BPM | DIASTOLIC BLOOD PRESSURE: 84 MMHG | SYSTOLIC BLOOD PRESSURE: 159 MMHG

## 2021-01-14 DIAGNOSIS — Z87.81 S/P ORIF (OPEN REDUCTION INTERNAL FIXATION) FRACTURE: ICD-10-CM

## 2021-01-14 DIAGNOSIS — S52.302K CLOSED FRACTURE OF SHAFT OF LEFT RADIUS WITH NONUNION, UNSPECIFIED FRACTURE MORPHOLOGY, SUBSEQUENT ENCOUNTER: Primary | ICD-10-CM

## 2021-01-14 DIAGNOSIS — Z98.890 S/P ORIF (OPEN REDUCTION INTERNAL FIXATION) FRACTURE: ICD-10-CM

## 2021-01-14 PROCEDURE — 99214 OFFICE O/P EST MOD 30 MIN: CPT | Mod: S$GLB,,, | Performed by: ORTHOPAEDIC SURGERY

## 2021-01-14 PROCEDURE — 73090 X-RAY EXAM OF FOREARM: CPT | Mod: TC,PO,LT

## 2021-01-14 PROCEDURE — 99214 PR OFFICE/OUTPT VISIT, EST, LEVL IV, 30-39 MIN: ICD-10-PCS | Mod: S$GLB,,, | Performed by: ORTHOPAEDIC SURGERY

## 2021-01-14 PROCEDURE — 99999 PR PBB SHADOW E&M-EST. PATIENT-LVL III: CPT | Mod: PBBFAC,,, | Performed by: ORTHOPAEDIC SURGERY

## 2021-01-14 PROCEDURE — 1126F PR PAIN SEVERITY QUANTIFIED, NO PAIN PRESENT: ICD-10-PCS | Mod: S$GLB,,, | Performed by: ORTHOPAEDIC SURGERY

## 2021-01-14 PROCEDURE — 73090 XR FOREARM LEFT: ICD-10-PCS | Mod: 26,LT,, | Performed by: RADIOLOGY

## 2021-01-14 PROCEDURE — 99999 PR PBB SHADOW E&M-EST. PATIENT-LVL III: ICD-10-PCS | Mod: PBBFAC,,, | Performed by: ORTHOPAEDIC SURGERY

## 2021-01-14 PROCEDURE — 1126F AMNT PAIN NOTED NONE PRSNT: CPT | Mod: S$GLB,,, | Performed by: ORTHOPAEDIC SURGERY

## 2021-01-14 PROCEDURE — 73090 X-RAY EXAM OF FOREARM: CPT | Mod: 26,LT,, | Performed by: RADIOLOGY

## 2021-01-21 PROBLEM — S52.302K: Status: ACTIVE | Noted: 2019-08-01

## 2021-05-10 ENCOUNTER — PATIENT MESSAGE (OUTPATIENT)
Dept: RESEARCH | Facility: HOSPITAL | Age: 36
End: 2021-05-10

## 2021-07-05 ENCOUNTER — PATIENT MESSAGE (OUTPATIENT)
Dept: ORTHOPEDICS | Facility: CLINIC | Age: 36
End: 2021-07-05

## 2021-07-07 DIAGNOSIS — M25.561 ACUTE PAIN OF RIGHT KNEE: Primary | ICD-10-CM

## 2021-07-08 ENCOUNTER — OFFICE VISIT (OUTPATIENT)
Dept: ORTHOPEDICS | Facility: CLINIC | Age: 36
End: 2021-07-08
Payer: MEDICAID

## 2021-07-08 VITALS — WEIGHT: 251 LBS | BODY MASS INDEX: 46.19 KG/M2 | HEIGHT: 62 IN

## 2021-07-08 DIAGNOSIS — S52.352K: ICD-10-CM

## 2021-07-08 DIAGNOSIS — Z01.818 PRE-OP TESTING: ICD-10-CM

## 2021-07-08 DIAGNOSIS — S52.302K CLOSED FRACTURE OF SHAFT OF LEFT RADIUS WITH NONUNION, UNSPECIFIED FRACTURE MORPHOLOGY, SUBSEQUENT ENCOUNTER: Primary | ICD-10-CM

## 2021-07-08 PROCEDURE — 99999 PR PBB SHADOW E&M-EST. PATIENT-LVL III: CPT | Mod: PBBFAC,,, | Performed by: ORTHOPAEDIC SURGERY

## 2021-07-08 PROCEDURE — 99214 PR OFFICE/OUTPT VISIT, EST, LEVL IV, 30-39 MIN: ICD-10-PCS | Mod: S$PBB,,, | Performed by: ORTHOPAEDIC SURGERY

## 2021-07-08 PROCEDURE — 99214 OFFICE O/P EST MOD 30 MIN: CPT | Mod: S$PBB,,, | Performed by: ORTHOPAEDIC SURGERY

## 2021-07-08 PROCEDURE — 99999 PR PBB SHADOW E&M-EST. PATIENT-LVL III: ICD-10-PCS | Mod: PBBFAC,,, | Performed by: ORTHOPAEDIC SURGERY

## 2021-07-08 PROCEDURE — 99213 OFFICE O/P EST LOW 20 MIN: CPT | Mod: PBBFAC,PN | Performed by: ORTHOPAEDIC SURGERY

## 2021-07-08 RX ORDER — SODIUM CHLORIDE 0.9 % (FLUSH) 0.9 %
10 SYRINGE (ML) INJECTION
Status: SHIPPED | OUTPATIENT
Start: 2021-07-08

## 2021-07-17 ENCOUNTER — PATIENT MESSAGE (OUTPATIENT)
Dept: ORTHOPEDICS | Facility: CLINIC | Age: 36
End: 2021-07-17

## 2021-07-19 DIAGNOSIS — S52.302K CLOSED FRACTURE OF SHAFT OF LEFT RADIUS WITH NONUNION, UNSPECIFIED FRACTURE MORPHOLOGY, SUBSEQUENT ENCOUNTER: Primary | ICD-10-CM

## 2021-07-20 RX ORDER — METHOCARBAMOL 750 MG/1
750 TABLET, FILM COATED ORAL 4 TIMES DAILY PRN
Qty: 44 TABLET | Refills: 0 | Status: SHIPPED | OUTPATIENT
Start: 2021-07-20 | End: 2021-07-27 | Stop reason: SDUPTHER

## 2021-07-20 RX ORDER — OXYCODONE AND ACETAMINOPHEN 10; 325 MG/1; MG/1
1 TABLET ORAL EVERY 4 HOURS PRN
Qty: 33 TABLET | Refills: 0 | Status: SHIPPED | OUTPATIENT
Start: 2021-07-20 | End: 2021-07-27 | Stop reason: SDUPTHER

## 2021-07-27 ENCOUNTER — OFFICE VISIT (OUTPATIENT)
Dept: ORTHOPEDICS | Facility: CLINIC | Age: 36
End: 2021-07-27
Payer: MEDICAID

## 2021-07-27 ENCOUNTER — HOSPITAL ENCOUNTER (OUTPATIENT)
Dept: RADIOLOGY | Facility: HOSPITAL | Age: 36
Discharge: HOME | End: 2021-07-27
Attending: ORTHOPAEDIC SURGERY
Payer: MEDICAID

## 2021-07-27 VITALS — BODY MASS INDEX: 46.19 KG/M2 | HEIGHT: 62 IN | WEIGHT: 251 LBS

## 2021-07-27 DIAGNOSIS — S52.322K CLOSED DISPLACED TRANSVERSE FRACTURE OF SHAFT OF LEFT RADIUS WITH NONUNION, SUBSEQUENT ENCOUNTER: Primary | ICD-10-CM

## 2021-07-27 DIAGNOSIS — S52.302K CLOSED FRACTURE OF SHAFT OF LEFT RADIUS WITH NONUNION, UNSPECIFIED FRACTURE MORPHOLOGY, SUBSEQUENT ENCOUNTER: ICD-10-CM

## 2021-07-27 PROCEDURE — 73090 XR FOREARM LEFT: ICD-10-PCS | Mod: 26,LT,, | Performed by: RADIOLOGY

## 2021-07-27 PROCEDURE — 99999 PR PBB SHADOW E&M-EST. PATIENT-LVL IV: ICD-10-PCS | Mod: PBBFAC,,, | Performed by: ORTHOPAEDIC SURGERY

## 2021-07-27 PROCEDURE — 99024 PR POST-OP FOLLOW-UP VISIT: ICD-10-PCS | Mod: ,,, | Performed by: ORTHOPAEDIC SURGERY

## 2021-07-27 PROCEDURE — 99999 PR PBB SHADOW E&M-EST. PATIENT-LVL IV: CPT | Mod: PBBFAC,,, | Performed by: ORTHOPAEDIC SURGERY

## 2021-07-27 PROCEDURE — 73090 X-RAY EXAM OF FOREARM: CPT | Mod: TC,PO,LT

## 2021-07-27 PROCEDURE — 73090 X-RAY EXAM OF FOREARM: CPT | Mod: 26,LT,, | Performed by: RADIOLOGY

## 2021-07-27 PROCEDURE — 99214 OFFICE O/P EST MOD 30 MIN: CPT | Mod: PBBFAC,PN | Performed by: ORTHOPAEDIC SURGERY

## 2021-07-27 PROCEDURE — 99024 POSTOP FOLLOW-UP VISIT: CPT | Mod: ,,, | Performed by: ORTHOPAEDIC SURGERY

## 2021-07-27 RX ORDER — FLUTICASONE PROPIONATE 50 MCG
SPRAY, SUSPENSION (ML) NASAL 2 TIMES DAILY PRN
COMMUNITY
Start: 2021-02-24

## 2021-07-27 RX ORDER — PANTOPRAZOLE SODIUM 40 MG/1
40 TABLET, DELAYED RELEASE ORAL DAILY
COMMUNITY
Start: 2021-07-19

## 2021-07-27 RX ORDER — METHOCARBAMOL 750 MG/1
750 TABLET, FILM COATED ORAL 4 TIMES DAILY PRN
Qty: 44 TABLET | Refills: 0 | Status: SHIPPED | OUTPATIENT
Start: 2021-07-27 | End: 2021-08-06

## 2021-07-27 RX ORDER — OXYCODONE AND ACETAMINOPHEN 10; 325 MG/1; MG/1
1 TABLET ORAL EVERY 4 HOURS PRN
Qty: 33 TABLET | Refills: 0 | Status: SHIPPED | OUTPATIENT
Start: 2021-07-27 | End: 2021-08-04 | Stop reason: SDUPTHER

## 2021-08-19 DIAGNOSIS — S52.322K CLOSED DISPLACED TRANSVERSE FRACTURE OF SHAFT OF LEFT RADIUS WITH NONUNION, SUBSEQUENT ENCOUNTER: Primary | ICD-10-CM

## 2021-08-24 DIAGNOSIS — S52.322K CLOSED DISPLACED TRANSVERSE FRACTURE OF SHAFT OF LEFT RADIUS WITH NONUNION, SUBSEQUENT ENCOUNTER: Primary | ICD-10-CM

## 2021-08-26 ENCOUNTER — OFFICE VISIT (OUTPATIENT)
Dept: ORTHOPEDICS | Facility: CLINIC | Age: 36
End: 2021-08-26
Payer: MEDICAID

## 2021-08-26 ENCOUNTER — HOSPITAL ENCOUNTER (OUTPATIENT)
Dept: RADIOLOGY | Facility: HOSPITAL | Age: 36
Discharge: HOME | End: 2021-08-26
Attending: ORTHOPAEDIC SURGERY
Payer: MEDICAID

## 2021-08-26 VITALS — WEIGHT: 251 LBS | HEIGHT: 62 IN | BODY MASS INDEX: 46.19 KG/M2

## 2021-08-26 DIAGNOSIS — S52.302K CLOSED FRACTURE OF SHAFT OF LEFT RADIUS WITH NONUNION, UNSPECIFIED FRACTURE MORPHOLOGY, SUBSEQUENT ENCOUNTER: ICD-10-CM

## 2021-08-26 DIAGNOSIS — S52.322K CLOSED DISPLACED TRANSVERSE FRACTURE OF SHAFT OF LEFT RADIUS WITH NONUNION, SUBSEQUENT ENCOUNTER: Primary | ICD-10-CM

## 2021-08-26 DIAGNOSIS — S52.322K CLOSED DISPLACED TRANSVERSE FRACTURE OF SHAFT OF LEFT RADIUS WITH NONUNION, SUBSEQUENT ENCOUNTER: ICD-10-CM

## 2021-08-26 PROCEDURE — 99214 OFFICE O/P EST MOD 30 MIN: CPT | Mod: PBBFAC,PN | Performed by: ORTHOPAEDIC SURGERY

## 2021-08-26 PROCEDURE — 73090 X-RAY EXAM OF FOREARM: CPT | Mod: TC,PO,LT

## 2021-08-26 PROCEDURE — 99999 PR PBB SHADOW E&M-EST. PATIENT-LVL IV: CPT | Mod: PBBFAC,,, | Performed by: ORTHOPAEDIC SURGERY

## 2021-08-26 PROCEDURE — 73090 XR FOREARM LEFT: ICD-10-PCS | Mod: 26,LT,, | Performed by: RADIOLOGY

## 2021-08-26 PROCEDURE — 99999 PR PBB SHADOW E&M-EST. PATIENT-LVL IV: ICD-10-PCS | Mod: PBBFAC,,, | Performed by: ORTHOPAEDIC SURGERY

## 2021-08-26 PROCEDURE — 73090 X-RAY EXAM OF FOREARM: CPT | Mod: 26,LT,, | Performed by: RADIOLOGY

## 2021-08-26 PROCEDURE — 99024 POSTOP FOLLOW-UP VISIT: CPT | Mod: ,,, | Performed by: ORTHOPAEDIC SURGERY

## 2021-08-26 PROCEDURE — 99024 PR POST-OP FOLLOW-UP VISIT: ICD-10-PCS | Mod: ,,, | Performed by: ORTHOPAEDIC SURGERY

## 2021-08-26 RX ORDER — METHOCARBAMOL 750 MG/1
750 TABLET, FILM COATED ORAL 4 TIMES DAILY PRN
Qty: 44 TABLET | Refills: 3 | Status: SHIPPED | OUTPATIENT
Start: 2021-08-26 | End: 2021-11-10 | Stop reason: SDUPTHER

## 2021-08-26 RX ORDER — OXYCODONE AND ACETAMINOPHEN 10; 325 MG/1; MG/1
1 TABLET ORAL EVERY 4 HOURS PRN
Qty: 44 TABLET | Refills: 0 | Status: SHIPPED | OUTPATIENT
Start: 2021-08-26 | End: 2021-09-03 | Stop reason: SDUPTHER

## 2021-10-07 ENCOUNTER — PATIENT MESSAGE (OUTPATIENT)
Dept: ADMINISTRATIVE | Facility: OTHER | Age: 36
End: 2021-10-07

## 2021-10-20 DIAGNOSIS — S52.322K CLOSED DISPLACED TRANSVERSE FRACTURE OF SHAFT OF LEFT RADIUS WITH NONUNION, SUBSEQUENT ENCOUNTER: Primary | ICD-10-CM

## 2021-10-28 ENCOUNTER — HOSPITAL ENCOUNTER (OUTPATIENT)
Dept: RADIOLOGY | Facility: HOSPITAL | Age: 36
Discharge: HOME | End: 2021-10-28
Attending: ORTHOPAEDIC SURGERY
Payer: MEDICAID

## 2021-10-28 ENCOUNTER — OFFICE VISIT (OUTPATIENT)
Dept: ORTHOPEDICS | Facility: CLINIC | Age: 36
End: 2021-10-28
Payer: MEDICAID

## 2021-10-28 VITALS — HEIGHT: 62 IN | WEIGHT: 251 LBS | BODY MASS INDEX: 46.19 KG/M2

## 2021-10-28 DIAGNOSIS — S52.322K CLOSED DISPLACED TRANSVERSE FRACTURE OF SHAFT OF LEFT RADIUS WITH NONUNION, SUBSEQUENT ENCOUNTER: Primary | ICD-10-CM

## 2021-10-28 DIAGNOSIS — S52.322K CLOSED DISPLACED TRANSVERSE FRACTURE OF SHAFT OF LEFT RADIUS WITH NONUNION, SUBSEQUENT ENCOUNTER: ICD-10-CM

## 2021-10-28 PROCEDURE — 99999 PR PBB SHADOW E&M-EST. PATIENT-LVL IV: ICD-10-PCS | Mod: PBBFAC,,, | Performed by: ORTHOPAEDIC SURGERY

## 2021-10-28 PROCEDURE — 99213 OFFICE O/P EST LOW 20 MIN: CPT | Mod: S$PBB,,, | Performed by: ORTHOPAEDIC SURGERY

## 2021-10-28 PROCEDURE — 99214 OFFICE O/P EST MOD 30 MIN: CPT | Mod: PBBFAC,PN | Performed by: ORTHOPAEDIC SURGERY

## 2021-10-28 PROCEDURE — 99213 PR OFFICE/OUTPT VISIT, EST, LEVL III, 20-29 MIN: ICD-10-PCS | Mod: S$PBB,,, | Performed by: ORTHOPAEDIC SURGERY

## 2021-10-28 PROCEDURE — 99999 PR PBB SHADOW E&M-EST. PATIENT-LVL IV: CPT | Mod: PBBFAC,,, | Performed by: ORTHOPAEDIC SURGERY

## 2021-10-28 PROCEDURE — 73090 X-RAY EXAM OF FOREARM: CPT | Mod: 26,LT,, | Performed by: RADIOLOGY

## 2021-10-28 PROCEDURE — 73090 X-RAY EXAM OF FOREARM: CPT | Mod: TC,PO,LT

## 2021-10-28 PROCEDURE — 73090 XR FOREARM LEFT: ICD-10-PCS | Mod: 26,LT,, | Performed by: RADIOLOGY

## 2021-10-28 RX ORDER — ROPINIROLE 0.5 MG/1
TABLET, FILM COATED ORAL
COMMUNITY
Start: 2021-10-13

## 2021-11-10 DIAGNOSIS — S52.322K CLOSED DISPLACED TRANSVERSE FRACTURE OF SHAFT OF LEFT RADIUS WITH NONUNION, SUBSEQUENT ENCOUNTER: Primary | ICD-10-CM

## 2021-11-10 RX ORDER — METHOCARBAMOL 750 MG/1
750 TABLET, FILM COATED ORAL 4 TIMES DAILY PRN
Qty: 44 TABLET | Refills: 3 | Status: SHIPPED | OUTPATIENT
Start: 2021-11-10 | End: 2022-02-24 | Stop reason: SDUPTHER

## 2022-01-05 ENCOUNTER — PATIENT MESSAGE (OUTPATIENT)
Dept: ADMINISTRATIVE | Facility: OTHER | Age: 37
End: 2022-01-05
Payer: MEDICAID

## 2022-01-20 DIAGNOSIS — S52.322K CLOSED DISPLACED TRANSVERSE FRACTURE OF SHAFT OF LEFT RADIUS WITH NONUNION, SUBSEQUENT ENCOUNTER: Primary | ICD-10-CM

## 2022-01-27 ENCOUNTER — OFFICE VISIT (OUTPATIENT)
Dept: ORTHOPEDICS | Facility: CLINIC | Age: 37
End: 2022-01-27
Payer: MEDICAID

## 2022-01-27 ENCOUNTER — HOSPITAL ENCOUNTER (OUTPATIENT)
Dept: RADIOLOGY | Facility: HOSPITAL | Age: 37
Discharge: HOME | End: 2022-01-27
Attending: ORTHOPAEDIC SURGERY
Payer: MEDICAID

## 2022-01-27 DIAGNOSIS — S52.322K CLOSED DISPLACED TRANSVERSE FRACTURE OF SHAFT OF LEFT RADIUS WITH NONUNION, SUBSEQUENT ENCOUNTER: ICD-10-CM

## 2022-01-27 DIAGNOSIS — S52.322K CLOSED DISPLACED TRANSVERSE FRACTURE OF SHAFT OF LEFT RADIUS WITH NONUNION, SUBSEQUENT ENCOUNTER: Primary | ICD-10-CM

## 2022-01-27 PROCEDURE — 1160F PR REVIEW ALL MEDS BY PRESCRIBER/CLIN PHARMACIST DOCUMENTED: ICD-10-PCS | Mod: CPTII,,, | Performed by: ORTHOPAEDIC SURGERY

## 2022-01-27 PROCEDURE — 99999 PR PBB SHADOW E&M-EST. PATIENT-LVL IV: CPT | Mod: PBBFAC,,, | Performed by: ORTHOPAEDIC SURGERY

## 2022-01-27 PROCEDURE — 99213 PR OFFICE/OUTPT VISIT, EST, LEVL III, 20-29 MIN: ICD-10-PCS | Mod: S$PBB,,, | Performed by: ORTHOPAEDIC SURGERY

## 2022-01-27 PROCEDURE — 73090 XR FOREARM LEFT: ICD-10-PCS | Mod: 26,LT,, | Performed by: RADIOLOGY

## 2022-01-27 PROCEDURE — 1160F RVW MEDS BY RX/DR IN RCRD: CPT | Mod: CPTII,,, | Performed by: ORTHOPAEDIC SURGERY

## 2022-01-27 PROCEDURE — 73090 X-RAY EXAM OF FOREARM: CPT | Mod: 26,LT,, | Performed by: RADIOLOGY

## 2022-01-27 PROCEDURE — 1159F MED LIST DOCD IN RCRD: CPT | Mod: CPTII,,, | Performed by: ORTHOPAEDIC SURGERY

## 2022-01-27 PROCEDURE — 73090 X-RAY EXAM OF FOREARM: CPT | Mod: TC,PO,LT

## 2022-01-27 PROCEDURE — 99213 OFFICE O/P EST LOW 20 MIN: CPT | Mod: S$PBB,,, | Performed by: ORTHOPAEDIC SURGERY

## 2022-01-27 PROCEDURE — 1159F PR MEDICATION LIST DOCUMENTED IN MEDICAL RECORD: ICD-10-PCS | Mod: CPTII,,, | Performed by: ORTHOPAEDIC SURGERY

## 2022-01-27 PROCEDURE — 99214 OFFICE O/P EST MOD 30 MIN: CPT | Mod: PBBFAC,PN | Performed by: ORTHOPAEDIC SURGERY

## 2022-01-27 PROCEDURE — 99999 PR PBB SHADOW E&M-EST. PATIENT-LVL IV: ICD-10-PCS | Mod: PBBFAC,,, | Performed by: ORTHOPAEDIC SURGERY

## 2022-01-27 NOTE — PROGRESS NOTES
Chief Complaint   Patient presents with    Left Forearm - Post-op Evaluation     ORIF L RADIUS REVISION 21       HPI:   This is a 36 y.o. 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 Patient has been FWB.  Pain is dull. No numbness or tingling. No associated signs or symptoms.    Past Medical History:   Diagnosis Date    Anxiety     Depression     Diabetes mellitus     Environmental allergies     Hyperlipidemia     Hypertension     Migraine     Seizures     last reported 2019    Thyroid disease      Past Surgical History:   Procedure Laterality Date    ABDOMINAL SURGERY           SECTION      INJECTION OF ANESTHETIC AGENT AROUND NERVE Left 2019    Procedure: Block, Nerve STELLATE GANGLION;  Surgeon: Tree Tapia MD;  Location: [facility] OR;  Service: Pain Management;  Laterality: Left;    OPEN REDUCTION AND INTERNAL FIXATION (ORIF) OF FRACTURE OF RADIUS Left 2019    Procedure: ORIF, FRACTURE, RADIAL SHAFT;  Surgeon: Ravi Panchal MD;  Location: [facility] OR;  Service: Orthopedics;  Laterality: Left;    OPEN REDUCTION AND INTERNAL FIXATION (ORIF) OF FRACTURE OF RADIUS Left 2019    Procedure: ORIF, FRACTURE, RADIUS;  Surgeon: Willard Candelario MD;  Location: [facility] OR;  Service: Orthopedics;  Laterality: Left;   REVISION ORIF RADIUS  General with block    OPEN REDUCTION AND INTERNAL FIXATION (ORIF) OF FRACTURE OF RADIUS Left 2019    Procedure: Revision ORIF, FRACTURE, RADIAL SHAFT;  Surgeon: Willard Candelario MD;  Location: [facility] OR;  Service: Orthopedics;  Laterality: Left;    OPEN REDUCTION AND INTERNAL FIXATION (ORIF) OF FRACTURE OF RADIUS Left 2020    Procedure: ORIF, FRACTURE, RADIUS Revision;  Surgeon: Willard Candelario MD;  Location: [facility] OR;  Service: Orthopedics;  Laterality: Left;    OPEN REDUCTION AND INTERNAL FIXATION (ORIF) OF FRACTURE OF RADIUS Left 2021    Procedure: ORIF, FRACTURE, RADIUS;  Surgeon: Willard LEMOS  MD [employer];  Location: [facility];  Service: Orthopedics;  Laterality: Left;  revision     Current Outpatient Medications on File Prior to Visit   Medication Sig Dispense Refill    apremilast (OTEZLA) 30 mg Tab Take 30 mg by mouth 2 (two) times daily.      atorvastatin (LIPITOR) 20 MG tablet Take 20 mg by mouth every evening.   3    calcitonin, salmon, (FORTICAL) 200 unit/actuation nasal spray 1 spray by Nasal route once daily.       carBAMazepine (TEGRETOL) 200 mg tablet       cetirizine (ZYRTEC) 10 MG tablet Take 10 mg by mouth once daily.      ergocalciferol (ERGOCALCIFEROL) 50,000 unit Cap Take 50,000 Units by mouth every 7 days.  0    fluticasone propionate (FLONASE) 50 mcg/actuation nasal spray instill ONE SPRAY in BOTH nostrils TWICE DAILY      hydrocortisone 2.5 % cream APPLY ONE GRAM TO THE AFFECTED AREA(S) TWICE DAILY  3    hydrOXYzine (ATARAX) 50 MG tablet Take 50 mg by mouth 3 (three) times daily as needed.       JARDIANCE 10 mg Tab TAKE ONE TABLET BY MOUTH EVERY DAY IN THE MORNING STOP TRADJENTA  1    JARDIANCE 25 mg tablet Take 25 mg by mouth once daily.       ketoconazole (NIZORAL) 2 % cream APPLY ONE GRAM TO AFFECTED AREA(S) TWICE DAILY FOR 5-7 DAYS  3    ketoconazole (NIZORAL) 2 % shampoo APPLY ONE APPLICATION TO SCALP IN SHOWER 2-3 TIMES WEEKLY  3    levothyroxine (SYNTHROID) 50 MCG tablet Take 50 mcg by mouth once daily.  2    magnesium oxide (MAG-OX) 400 mg (241.3 mg magnesium) tablet Take 1 tablet by mouth once daily.      methocarbamoL (ROBAXIN) 750 MG Tab Take 1 tablet (750 mg total) by mouth 4 (four) times daily as needed (spasm). 44 tablet 3    metoprolol tartrate (LOPRESSOR) 50 MG tablet Take 50 mg by mouth 2 (two) times daily.      montelukast (SINGULAIR) 10 mg tablet Take 10 mg by mouth every evening.  2    OXcarbazepine (TRILEPTAL) 600 MG Tab Take 600 mg by mouth 2 (two) times daily.       oxyCODONE-acetaminophen (PERCOCET)  mg per tablet Take 1 tablet by mouth  every 4 (four) hours as needed for Pain. 44 tablet 0    pantoprazole (PROTONIX) 40 MG tablet Take 40 mg by mouth once daily.      rOPINIRole (REQUIP) 0.5 MG tablet Take by mouth.      SLOW RELEASE IRON 160 mg (50 mg iron) TbSR 160 mg once daily.   0    terconazole (TERAZOL 7) 0.4 % Crea USE ONE APPLICATORFUL VAGINALLY daily AT BEDTIME FOR 7 DAYS      TRUE METRIX GLUCOSE METER Misc USE AS DIRECTED TO check blood glucose FIVE TIMES DAILY  0    TRUE METRIX GLUCOSE TEST STRIP Strp       TRUEPLUS LANCETS 30 gauge Misc USE TO CHECK BLOOD SUGAR FIVE TIMES DAILY  5    VRAYLAR 1.5 mg Cap Take 1 capsule by mouth every morning.      VRAYLAR 4.5 mg Cap Take 1 capsule by mouth every morning.      zolpidem (AMBIEN) 5 MG Tab Take 5 mg by mouth nightly as needed.      gabapentin (NEURONTIN) 600 MG tablet Take 1 tablet (600 mg total) by mouth 3 (three) times daily. 90 tablet 2     Current Facility-Administered Medications on File Prior to Visit   Medication Dose Route Frequency Provider Last Rate Last Admin    electrolyte-S (ISOLYTE)   Intravenous Continuous Ritesh Adkins MD        lactated ringers infusion   Intravenous Continuous Ritesh Adkins MD 10 mL/hr at 07/17/20 0643 New Bag at 07/17/20 0643    lidocaine (PF) 10 mg/ml (1%) injection 10 mg  1 mL Intradermal Once Ritesh Adkins MD        sodium chloride 0.9% flush 10 mL  10 mL Intravenous PRN Willard Candelario MD        sodium chloride 0.9% flush 3 mL  3 mL Intravenous Q6H PRN Willard Candelario MD        sodium chloride 0.9% flush 3 mL  3 mL Intravenous Q6H PRN Willard Candelario MD         Review of patient's allergies indicates:   Allergen Reactions    Keppra [levetiracetam]      Makes aggressive    Tramadol      Seizures    Xanax [alprazolam] Other (See Comments)     Makes aggressive     Family History   Problem Relation Age of Onset    Diabetes Mother     Diabetes Father     Hypertension Father     Heart disease Father     Heart attack Father       Social History     Socioeconomic History    Marital status:    Tobacco Use    Smoking status: Former Smoker     Packs/day: 0.50     Years: 3.00     Pack years: 1.50     Types: Cigarettes     Quit date:      Years since quittin.0    Smokeless tobacco: Never Used   Substance and Sexual Activity    Alcohol use: Yes     Comment: One glass of wine once a month    Drug use: No    Sexual activity: Yes     Partners: Male     Birth control/protection: None       Review of Systems:  Constitutional:  Denies fever or chills   Eyes:  Denies change in visual acuity   HENT:  Denies nasal congestion or sore throat   Respiratory:  Denies cough or shortness of breath   Cardiovascular:  Denies chest pain or edema   GI:  Denies abdominal pain, nausea, vomiting, bloody stools or diarrhea   :  Denies dysuria   Integument:  Denies rash   Neurologic:  Denies headache, focal weakness or sensory changes   Endocrine:  Denies polyuria or polydipsia   Lymphatic:  Denies swollen glands   Psychiatric:  Denies depression or anxiety     Physical Exam:   Constitutional:  Well developed, well nourished, no acute distress, non-toxic appearance   Integument:  Well hydrated, no rash   Lymphatic:  No lymphadenopathy noted   Neurologic:  Alert & oriented x 3, CN 2-12 normal, normal motor function, normal sensory function, no focal deficits noted   Psychiatric:  Speech and behavior appropriate   Gi: abdomen soft  Eyes: EOMI    RUE  Exam performed same as contralateral side and is normal    LUE  No point TTP about the nonunion site. Overall normal alignment. Skin intact. Compartments soft. NVI distally.     X-rays were performed today, personally reviewed by me and findings discussed with the patient.  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 RTC in 3 months recheck.

## 2022-02-20 ENCOUNTER — PATIENT MESSAGE (OUTPATIENT)
Dept: ORTHOPEDICS | Facility: CLINIC | Age: 37
End: 2022-02-20
Payer: MEDICAID

## 2022-02-22 ENCOUNTER — PATIENT MESSAGE (OUTPATIENT)
Dept: ORTHOPEDICS | Facility: CLINIC | Age: 37
End: 2022-02-22
Payer: MEDICAID

## 2022-02-24 DIAGNOSIS — S52.322K CLOSED DISPLACED TRANSVERSE FRACTURE OF SHAFT OF LEFT RADIUS WITH NONUNION, SUBSEQUENT ENCOUNTER: Primary | ICD-10-CM

## 2022-03-08 ENCOUNTER — HOSPITAL ENCOUNTER (OUTPATIENT)
Dept: RADIOLOGY | Facility: HOSPITAL | Age: 37
Discharge: HOME | End: 2022-03-08
Attending: ORTHOPAEDIC SURGERY
Payer: MEDICAID

## 2022-03-08 ENCOUNTER — OFFICE VISIT (OUTPATIENT)
Dept: ORTHOPEDICS | Facility: CLINIC | Age: 37
End: 2022-03-08
Payer: MEDICAID

## 2022-03-08 VITALS — BODY MASS INDEX: 46.19 KG/M2 | WEIGHT: 251 LBS | HEIGHT: 62 IN

## 2022-03-08 DIAGNOSIS — S52.322K CLOSED DISPLACED TRANSVERSE FRACTURE OF SHAFT OF LEFT RADIUS WITH NONUNION, SUBSEQUENT ENCOUNTER: ICD-10-CM

## 2022-03-08 DIAGNOSIS — M77.12 LATERAL EPICONDYLITIS OF LEFT ELBOW: Primary | ICD-10-CM

## 2022-03-08 PROCEDURE — 99214 OFFICE O/P EST MOD 30 MIN: CPT | Mod: 25,S$PBB,, | Performed by: ORTHOPAEDIC SURGERY

## 2022-03-08 PROCEDURE — 3008F PR BODY MASS INDEX (BMI) DOCUMENTED: ICD-10-PCS | Mod: CPTII,,, | Performed by: ORTHOPAEDIC SURGERY

## 2022-03-08 PROCEDURE — 3008F BODY MASS INDEX DOCD: CPT | Mod: CPTII,,, | Performed by: ORTHOPAEDIC SURGERY

## 2022-03-08 PROCEDURE — 73090 XR FOREARM LEFT: ICD-10-PCS | Mod: 26,LT,, | Performed by: RADIOLOGY

## 2022-03-08 PROCEDURE — 99999 PR PBB SHADOW E&M-EST. PATIENT-LVL IV: ICD-10-PCS | Mod: PBBFAC,,, | Performed by: ORTHOPAEDIC SURGERY

## 2022-03-08 PROCEDURE — 99999 PR PBB SHADOW E&M-EST. PATIENT-LVL IV: CPT | Mod: PBBFAC,,, | Performed by: ORTHOPAEDIC SURGERY

## 2022-03-08 PROCEDURE — 20605 DRAIN/INJ JOINT/BURSA W/O US: CPT | Mod: PBBFAC,PN | Performed by: ORTHOPAEDIC SURGERY

## 2022-03-08 PROCEDURE — 99214 OFFICE O/P EST MOD 30 MIN: CPT | Mod: PBBFAC,PN | Performed by: ORTHOPAEDIC SURGERY

## 2022-03-08 PROCEDURE — 73090 X-RAY EXAM OF FOREARM: CPT | Mod: TC,PO,LT

## 2022-03-08 PROCEDURE — 1159F MED LIST DOCD IN RCRD: CPT | Mod: CPTII,,, | Performed by: ORTHOPAEDIC SURGERY

## 2022-03-08 PROCEDURE — 99214 PR OFFICE/OUTPT VISIT, EST, LEVL IV, 30-39 MIN: ICD-10-PCS | Mod: 25,S$PBB,, | Performed by: ORTHOPAEDIC SURGERY

## 2022-03-08 PROCEDURE — 1160F PR REVIEW ALL MEDS BY PRESCRIBER/CLIN PHARMACIST DOCUMENTED: ICD-10-PCS | Mod: CPTII,,, | Performed by: ORTHOPAEDIC SURGERY

## 2022-03-08 PROCEDURE — 20605 INTERMEDIATE JOINT ASPIRATION/INJECTION: ICD-10-PCS | Mod: S$PBB,LT,, | Performed by: ORTHOPAEDIC SURGERY

## 2022-03-08 PROCEDURE — 1160F RVW MEDS BY RX/DR IN RCRD: CPT | Mod: CPTII,,, | Performed by: ORTHOPAEDIC SURGERY

## 2022-03-08 PROCEDURE — 73090 X-RAY EXAM OF FOREARM: CPT | Mod: 26,LT,, | Performed by: RADIOLOGY

## 2022-03-08 PROCEDURE — 1159F PR MEDICATION LIST DOCUMENTED IN MEDICAL RECORD: ICD-10-PCS | Mod: CPTII,,, | Performed by: ORTHOPAEDIC SURGERY

## 2022-03-08 RX ORDER — OXCARBAZEPINE 150 MG/1
150 TABLET, FILM COATED ORAL 2 TIMES DAILY
COMMUNITY
Start: 2022-02-22

## 2022-03-08 RX ORDER — SPIRONOLACTONE 25 MG/1
25 TABLET ORAL 2 TIMES DAILY
COMMUNITY
Start: 2022-02-28 | End: 2022-05-16 | Stop reason: CLARIF

## 2022-03-08 RX ORDER — TRIAMCINOLONE ACETONIDE 40 MG/ML
40 INJECTION, SUSPENSION INTRA-ARTICULAR; INTRAMUSCULAR
Status: DISCONTINUED | OUTPATIENT
Start: 2022-03-08 | End: 2022-03-08 | Stop reason: HOSPADM

## 2022-03-08 RX ORDER — ZOLPIDEM TARTRATE 10 MG/1
10 TABLET ORAL NIGHTLY PRN
COMMUNITY
Start: 2022-03-01

## 2022-03-08 RX ORDER — LEVOMEFOLATE MAGNESIUM, LEUCOVORIN, FOLIC ACID, FERROUS CYSTEINE GLYCINATE, MAGNESIUM ASCORBATE, ZINC ASCORBATE, COCARBOXYLASE, FLAVIN ADENINE DINUCLEOTIDE, NADH, PYRIDOXAL PHOSPHATE ANHYDROUS, COBAMAMIDE, BETAINE, MAGNESIUM L-THREONATE, 1,2-DOCOSAHEXANOYL-SN-GLYCERO-3-PHOSPHOSERINE CALCIUM, 1,2-ICOSAPENTOYL-SN-GLYCERO-3-PHOSPHOSERINE CALCIUM, AND PHOSPHATIDYL SERINE 5.23; 2.5; 1; 13.6; 24; 1; 25; 25; 25; 25; 50; 500; 1; 6.4; 800; 12 MG/1; MG/1; MG/1; MG/1; MG/1; MG/1; UG/1; UG/1; UG/1; UG/1; UG/1; UG/1; MG/1; MG/1; UG/1; MG/1
1 CAPSULE, DELAYED RELEASE PELLETS ORAL EVERY MORNING
COMMUNITY
Start: 2022-02-22

## 2022-03-08 RX ORDER — ESCITALOPRAM OXALATE 10 MG/1
TABLET ORAL DAILY
COMMUNITY
Start: 2022-03-08

## 2022-03-08 RX ORDER — VALBENAZINE 80 MG/1
1 CAPSULE ORAL DAILY
COMMUNITY
Start: 2022-02-23

## 2022-03-08 RX ORDER — TRAZODONE HYDROCHLORIDE 50 MG/1
TABLET ORAL
COMMUNITY
Start: 2022-02-19

## 2022-03-08 RX ORDER — ATORVASTATIN CALCIUM 40 MG/1
40 TABLET, FILM COATED ORAL NIGHTLY
COMMUNITY
Start: 2022-02-28

## 2022-03-08 RX ADMIN — TRIAMCINOLONE ACETONIDE 40 MG: 40 INJECTION, SUSPENSION INTRA-ARTICULAR; INTRAMUSCULAR at 03:03

## 2022-03-08 NOTE — PROGRESS NOTES
Chief Complaint   Patient presents with    Left Forearm - Pain       HPI:    This is a 36 y.o. who presents today complaining of left elbow pain for 2 weeks after no known trauma. Pain is throbbing. No numbness or tingling. No associated signs or symptoms.      Past Medical History:   Diagnosis Date    Anxiety     Depression     Diabetes mellitus     Environmental allergies     Hyperlipidemia     Hypertension     Migraine     Seizures     last reported 2019    Thyroid disease       Past Surgical History:   Procedure Laterality Date    ABDOMINAL SURGERY           SECTION      INJECTION OF ANESTHETIC AGENT AROUND NERVE Left 2019    Procedure: Block, Nerve STELLATE GANGLION;  Surgeon: Tree Tapia MD;  Location: [facility] OR;  Service: Pain Management;  Laterality: Left;    OPEN REDUCTION AND INTERNAL FIXATION (ORIF) OF FRACTURE OF RADIUS Left 2019    Procedure: ORIF, FRACTURE, RADIAL SHAFT;  Surgeon: Ravi Panchal MD;  Location: [facility] OR;  Service: Orthopedics;  Laterality: Left;    OPEN REDUCTION AND INTERNAL FIXATION (ORIF) OF FRACTURE OF RADIUS Left 2019    Procedure: ORIF, FRACTURE, RADIUS;  Surgeon: Willard Candelario MD;  Location: [facility] OR;  Service: Orthopedics;  Laterality: Left;   REVISION ORIF RADIUS  General with block    OPEN REDUCTION AND INTERNAL FIXATION (ORIF) OF FRACTURE OF RADIUS Left 2019    Procedure: Revision ORIF, FRACTURE, RADIAL SHAFT;  Surgeon: Willard Candelario MD;  Location: [facility] OR;  Service: Orthopedics;  Laterality: Left;    OPEN REDUCTION AND INTERNAL FIXATION (ORIF) OF FRACTURE OF RADIUS Left 2020    Procedure: ORIF, FRACTURE, RADIUS Revision;  Surgeon: Willard Candelario MD;  Location: [facility] OR;  Service: Orthopedics;  Laterality: Left;    OPEN REDUCTION AND INTERNAL FIXATION (ORIF) OF FRACTURE OF RADIUS Left 2021    Procedure: ORIF, FRACTURE, RADIUS;  Surgeon: Willard Candelario MD;  Location: [facility] OR;  Service: Orthopedics;   Laterality: Left;  revision      Current Outpatient Medications on File Prior to Visit   Medication Sig Dispense Refill    apremilast (OTEZLA) 30 mg Tab Take 30 mg by mouth 2 (two) times daily.      atorvastatin (LIPITOR) 40 MG tablet Take 40 mg by mouth nightly.      calcitonin, salmon, (FORTICAL) 200 unit/actuation nasal spray 1 spray by Nasal route once daily.       carBAMazepine (TEGRETOL) 200 mg tablet       cetirizine (ZYRTEC) 10 MG tablet Take 10 mg by mouth once daily.      ENBRACE HR 1.5 mg iron- 8.73 mg-6.4 mg capsule Take 1 capsule by mouth every morning.      ergocalciferol (ERGOCALCIFEROL) 50,000 unit Cap Take 50,000 Units by mouth every 7 days.  0    EScitalopram oxalate (LEXAPRO) 10 MG tablet       fluticasone propionate (FLONASE) 50 mcg/actuation nasal spray instill ONE SPRAY in BOTH nostrils TWICE DAILY      hydrocortisone 2.5 % cream APPLY ONE GRAM TO THE AFFECTED AREA(S) TWICE DAILY  3    hydrOXYzine (ATARAX) 50 MG tablet Take 50 mg by mouth 3 (three) times daily as needed.       INGREZZA 80 mg Cap Take 1 capsule by mouth once daily.      JARDIANCE 10 mg Tab TAKE ONE TABLET BY MOUTH EVERY DAY IN THE MORNING STOP TRADJENTA  1    JARDIANCE 25 mg tablet Take 25 mg by mouth once daily.       ketoconazole (NIZORAL) 2 % cream APPLY ONE GRAM TO AFFECTED AREA(S) TWICE DAILY FOR 5-7 DAYS  3    ketoconazole (NIZORAL) 2 % shampoo APPLY ONE APPLICATION TO SCALP IN SHOWER 2-3 TIMES WEEKLY  3    levothyroxine (SYNTHROID) 50 MCG tablet Take 50 mcg by mouth once daily.  2    magnesium oxide (MAG-OX) 400 mg (241.3 mg magnesium) tablet Take 1 tablet by mouth once daily.      methocarbamoL (ROBAXIN) 750 MG Tab Take 1 tablet (750 mg total) by mouth 4 (four) times daily as needed (spasm). 44 tablet 3    metoprolol tartrate (LOPRESSOR) 50 MG tablet Take 50 mg by mouth 2 (two) times daily.      montelukast (SINGULAIR) 10 mg tablet Take 10 mg by mouth every evening.  2    OXcarbazepine (TRILEPTAL)  600 MG Tab Take 600 mg by mouth 2 (two) times daily.       oxyCODONE-acetaminophen (PERCOCET)  mg per tablet Take 1 tablet by mouth every 4 (four) hours as needed for Pain. 44 tablet 0    pantoprazole (PROTONIX) 40 MG tablet Take 40 mg by mouth once daily.      rOPINIRole (REQUIP) 0.5 MG tablet Take by mouth.      SLOW RELEASE IRON 160 mg (50 mg iron) TbSR 160 mg once daily.   0    spironolactone (ALDACTONE) 25 MG tablet Take 25 mg by mouth 2 (two) times daily.      terconazole (TERAZOL 7) 0.4 % Crea USE ONE APPLICATORFUL VAGINALLY daily AT BEDTIME FOR 7 DAYS      traZODone (DESYREL) 50 MG tablet take one Tablet by mouth once a day at bedtime AS NEEDED FOR SLEEP      TRILEPTAL 150 mg Tab Take 150 mg by mouth 2 (two) times daily.      TRUE METRIX GLUCOSE METER Misc USE AS DIRECTED TO check blood glucose FIVE TIMES DAILY  0    TRUE METRIX GLUCOSE TEST STRIP Strp       TRUEPLUS LANCETS 30 gauge Misc USE TO CHECK BLOOD SUGAR FIVE TIMES DAILY  5    VRAYLAR 4.5 mg Cap Take 1 capsule by mouth every morning.      zolpidem (AMBIEN) 10 mg Tab Take 10 mg by mouth nightly as needed.      gabapentin (NEURONTIN) 600 MG tablet Take 1 tablet (600 mg total) by mouth 3 (three) times daily. 90 tablet 2    [DISCONTINUED] atorvastatin (LIPITOR) 20 MG tablet Take 20 mg by mouth every evening.   3    [DISCONTINUED] VRAYLAR 1.5 mg Cap Take 1 capsule by mouth every morning.      [DISCONTINUED] zolpidem (AMBIEN) 5 MG Tab Take 5 mg by mouth nightly as needed.       Current Facility-Administered Medications on File Prior to Visit   Medication Dose Route Frequency Provider Last Rate Last Admin    electrolyte-S (ISOLYTE)   Intravenous Continuous Ritesh Adkins MD        lactated ringers infusion   Intravenous Continuous Ritesh Adkins MD 10 mL/hr at 07/17/20 0643 New Bag at 07/17/20 0643    lidocaine (PF) 10 mg/ml (1%) injection 10 mg  1 mL Intradermal Once Ritesh Adkins MD        sodium chloride 0.9% flush 10 mL   10 mL Intravenous PRN Willard Candelario MD        sodium chloride 0.9% flush 3 mL  3 mL Intravenous Q6H PRN Willard Candelario MD        sodium chloride 0.9% flush 3 mL  3 mL Intravenous Q6H PRN Willard Candelario MD          Review of patient's allergies indicates:   Allergen Reactions    Keppra [levetiracetam]      Makes aggressive    Tramadol      Seizures    Xanax [alprazolam] Other (See Comments)     Makes aggressive      Family History not pertinent   Social History     Socioeconomic History    Marital status:    Tobacco Use    Smoking status: Former Smoker     Packs/day: 0.50     Years: 3.00     Pack years: 1.50     Types: Cigarettes     Quit date:      Years since quittin.    Smokeless tobacco: Never Used   Substance and Sexual Activity    Alcohol use: Yes     Comment: One glass of wine once a month    Drug use: No    Sexual activity: Yes     Partners: Male     Birth control/protection: None         Review of Systems:   Constitutional:  Denies fever or chills    Eyes:  Denies change in visual acuity    HENT:  Denies nasal congestion or sore throat    Respiratory:  Denies cough or shortness of breath    Cardiovascular:  Denies chest pain or edema    GI:  Denies abdominal pain, nausea, vomiting, bloody stools or diarrhea    :  Denies dysuria    Integument:  Denies rash    Neurologic:  Denies headache, focal weakness or sensory changes    Endocrine:  Denies polyuria or polydipsia    Lymphatic:  Denies swollen glands    Psychiatric:  Denies depression or anxiety       Physical Exam:    Constitutional:  Well developed, well nourished, no acute distress, non-toxic appearance    Integument:  Well hydrated, no rash    Lymphatic:  No lymphadenopathy noted    Neurologic:  Alert & oriented x 3,     Psychiatric:  Speech and behavior appropriate    Gi: abdomen soft  Eyes: EOMI   R elbow  Exam performed same as contralateral side and is normal    L elbow  Point TTP about the lateral epicondyle. Pain  with wrist extension. Overall normal alignment. Skin intact. Compartments soft. NVI distally.         Lateral epicondylitis of left elbow          Using an aseptic technique, I injected 1 cc of lidocaine 1% without and 1 cc of kenalog 40mg into the left elbow. The patient tolerated this well. I will have them return to clinic as needed.

## 2022-03-08 NOTE — PROCEDURES
L lateral epicondyleIntermediate Joint Aspiration/Injection    Date/Time: 3/8/2022 3:45 PM  Performed by: Willard Candelario MD  Authorized by: Willard Candelario MD     Consent Done?: Yes (Verbal)  Indications:  Pain  Site marked: The procedure site was marked    Timeout: Prior to procedure the correct patient, procedure, and site was verified      Location:  Elbow  Prep: Patient was prepped and draped in usual sterile fashion    Needle size:  25 G  Medications:  40 mg triamcinolone acetonide 40 mg/mL  Patient tolerance:  Patient tolerated the procedure well with no immediate complications

## 2022-04-21 DIAGNOSIS — M77.12 LATERAL EPICONDYLITIS OF LEFT ELBOW: Primary | ICD-10-CM

## 2022-05-03 ENCOUNTER — OFFICE VISIT (OUTPATIENT)
Dept: ORTHOPEDICS | Facility: CLINIC | Age: 37
End: 2022-05-03
Payer: MEDICAID

## 2022-05-03 ENCOUNTER — HOSPITAL ENCOUNTER (OUTPATIENT)
Dept: RADIOLOGY | Facility: HOSPITAL | Age: 37
Discharge: HOME | End: 2022-05-03
Attending: ORTHOPAEDIC SURGERY
Payer: MEDICAID

## 2022-05-03 VITALS — WEIGHT: 251 LBS | BODY MASS INDEX: 46.19 KG/M2 | HEIGHT: 62 IN

## 2022-05-03 DIAGNOSIS — S52.322K CLOSED DISPLACED TRANSVERSE FRACTURE OF SHAFT OF LEFT RADIUS WITH NONUNION, SUBSEQUENT ENCOUNTER: ICD-10-CM

## 2022-05-03 DIAGNOSIS — M77.12 LATERAL EPICONDYLITIS OF LEFT ELBOW: ICD-10-CM

## 2022-05-03 DIAGNOSIS — S52.322K CLOSED DISPLACED TRANSVERSE FRACTURE OF SHAFT OF LEFT RADIUS WITH NONUNION, SUBSEQUENT ENCOUNTER: Primary | ICD-10-CM

## 2022-05-03 PROCEDURE — 1160F PR REVIEW ALL MEDS BY PRESCRIBER/CLIN PHARMACIST DOCUMENTED: ICD-10-PCS | Mod: CPTII,,, | Performed by: ORTHOPAEDIC SURGERY

## 2022-05-03 PROCEDURE — 99999 PR PBB SHADOW E&M-EST. PATIENT-LVL IV: ICD-10-PCS | Mod: PBBFAC,,, | Performed by: ORTHOPAEDIC SURGERY

## 2022-05-03 PROCEDURE — 99214 OFFICE O/P EST MOD 30 MIN: CPT | Mod: PBBFAC,PN | Performed by: ORTHOPAEDIC SURGERY

## 2022-05-03 PROCEDURE — 99213 OFFICE O/P EST LOW 20 MIN: CPT | Mod: S$PBB,,, | Performed by: ORTHOPAEDIC SURGERY

## 2022-05-03 PROCEDURE — 3008F PR BODY MASS INDEX (BMI) DOCUMENTED: ICD-10-PCS | Mod: CPTII,,, | Performed by: ORTHOPAEDIC SURGERY

## 2022-05-03 PROCEDURE — 73090 XR FOREARM LEFT: ICD-10-PCS | Mod: 26,LT,, | Performed by: RADIOLOGY

## 2022-05-03 PROCEDURE — 73090 X-RAY EXAM OF FOREARM: CPT | Mod: 26,LT,, | Performed by: RADIOLOGY

## 2022-05-03 PROCEDURE — 73090 X-RAY EXAM OF FOREARM: CPT | Mod: TC,PO,LT

## 2022-05-03 PROCEDURE — 99999 PR PBB SHADOW E&M-EST. PATIENT-LVL IV: CPT | Mod: PBBFAC,,, | Performed by: ORTHOPAEDIC SURGERY

## 2022-05-03 PROCEDURE — 99213 PR OFFICE/OUTPT VISIT, EST, LEVL III, 20-29 MIN: ICD-10-PCS | Mod: S$PBB,,, | Performed by: ORTHOPAEDIC SURGERY

## 2022-05-03 PROCEDURE — 1159F MED LIST DOCD IN RCRD: CPT | Mod: CPTII,,, | Performed by: ORTHOPAEDIC SURGERY

## 2022-05-03 PROCEDURE — 1160F RVW MEDS BY RX/DR IN RCRD: CPT | Mod: CPTII,,, | Performed by: ORTHOPAEDIC SURGERY

## 2022-05-03 PROCEDURE — 1159F PR MEDICATION LIST DOCUMENTED IN MEDICAL RECORD: ICD-10-PCS | Mod: CPTII,,, | Performed by: ORTHOPAEDIC SURGERY

## 2022-05-03 PROCEDURE — 3008F BODY MASS INDEX DOCD: CPT | Mod: CPTII,,, | Performed by: ORTHOPAEDIC SURGERY

## 2022-05-03 NOTE — PROGRESS NOTES
Chief Complaint   Patient presents with    Left Forearm - Post-op Evaluation       HPI:   This is a 36 y.o. 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 Patient has been FWB.  Pain is now gone. No numbness or tingling. No associated signs or symptoms.    Past Medical History:   Diagnosis Date    Anxiety     Depression     Diabetes mellitus     Environmental allergies     Hyperlipidemia     Hypertension     Migraine     Seizures     last reported 2019    Thyroid disease      Past Surgical History:   Procedure Laterality Date    ABDOMINAL SURGERY           SECTION      INJECTION OF ANESTHETIC AGENT AROUND NERVE Left 2019    Procedure: Block, Nerve STELLATE GANGLION;  Surgeon: Tree Tapia MD;  Location: [facility] OR;  Service: Pain Management;  Laterality: Left;    OPEN REDUCTION AND INTERNAL FIXATION (ORIF) OF FRACTURE OF RADIUS Left 2019    Procedure: ORIF, FRACTURE, RADIAL SHAFT;  Surgeon: Ravi Panchal MD;  Location: [facility] OR;  Service: Orthopedics;  Laterality: Left;    OPEN REDUCTION AND INTERNAL FIXATION (ORIF) OF FRACTURE OF RADIUS Left 2019    Procedure: ORIF, FRACTURE, RADIUS;  Surgeon: Willard Candelario MD;  Location: [facility] OR;  Service: Orthopedics;  Laterality: Left;   REVISION ORIF RADIUS  General with block    OPEN REDUCTION AND INTERNAL FIXATION (ORIF) OF FRACTURE OF RADIUS Left 2019    Procedure: Revision ORIF, FRACTURE, RADIAL SHAFT;  Surgeon: Willard Candelario MD;  Location: [facility] OR;  Service: Orthopedics;  Laterality: Left;    OPEN REDUCTION AND INTERNAL FIXATION (ORIF) OF FRACTURE OF RADIUS Left 2020    Procedure: ORIF, FRACTURE, RADIUS Revision;  Surgeon: Willard Candelario MD;  Location: [facility] OR;  Service: Orthopedics;  Laterality: Left;    OPEN REDUCTION AND INTERNAL FIXATION (ORIF) OF FRACTURE OF RADIUS Left 2021    Procedure: ORIF, FRACTURE, RADIUS;  Surgeon: Willard Candelario MD;  Location: [facility]  OR;  Service: Orthopedics;  Laterality: Left;  revision     Current Outpatient Medications on File Prior to Visit   Medication Sig Dispense Refill    apremilast (OTEZLA) 30 mg Tab Take 30 mg by mouth 2 (two) times daily.      atorvastatin (LIPITOR) 40 MG tablet Take 40 mg by mouth nightly.      calcitonin, salmon, (FORTICAL) 200 unit/actuation nasal spray 1 spray by Nasal route once daily.       carBAMazepine (TEGRETOL) 200 mg tablet       cetirizine (ZYRTEC) 10 MG tablet Take 10 mg by mouth once daily.      ENBRACE HR 1.5 mg iron- 8.73 mg-6.4 mg capsule Take 1 capsule by mouth every morning.      ergocalciferol (ERGOCALCIFEROL) 50,000 unit Cap Take 50,000 Units by mouth every 7 days.  0    EScitalopram oxalate (LEXAPRO) 10 MG tablet       fluticasone propionate (FLONASE) 50 mcg/actuation nasal spray instill ONE SPRAY in BOTH nostrils TWICE DAILY      hydrocortisone 2.5 % cream APPLY ONE GRAM TO THE AFFECTED AREA(S) TWICE DAILY  3    hydrOXYzine (ATARAX) 50 MG tablet Take 50 mg by mouth 3 (three) times daily as needed.       INGREZZA 80 mg Cap Take 1 capsule by mouth once daily.      JARDIANCE 10 mg Tab TAKE ONE TABLET BY MOUTH EVERY DAY IN THE MORNING STOP TRADJENTA  1    JARDIANCE 25 mg tablet Take 25 mg by mouth once daily.       ketoconazole (NIZORAL) 2 % cream APPLY ONE GRAM TO AFFECTED AREA(S) TWICE DAILY FOR 5-7 DAYS  3    ketoconazole (NIZORAL) 2 % shampoo APPLY ONE APPLICATION TO SCALP IN SHOWER 2-3 TIMES WEEKLY  3    levothyroxine (SYNTHROID) 50 MCG tablet Take 50 mcg by mouth once daily.  2    magnesium oxide (MAG-OX) 400 mg (241.3 mg magnesium) tablet Take 1 tablet by mouth once daily.      methocarbamoL (ROBAXIN) 750 MG Tab Take 1 tablet (750 mg total) by mouth 4 (four) times daily as needed (spasm). 44 tablet 3    metoprolol tartrate (LOPRESSOR) 50 MG tablet Take 50 mg by mouth 2 (two) times daily.      montelukast (SINGULAIR) 10 mg tablet Take 10 mg by mouth every evening.  2     OXcarbazepine (TRILEPTAL) 600 MG Tab Take 600 mg by mouth 2 (two) times daily.       oxyCODONE-acetaminophen (PERCOCET)  mg per tablet Take 1 tablet by mouth every 4 (four) hours as needed for Pain. 44 tablet 0    pantoprazole (PROTONIX) 40 MG tablet Take 40 mg by mouth once daily.      rOPINIRole (REQUIP) 0.5 MG tablet Take by mouth.      SLOW RELEASE IRON 160 mg (50 mg iron) TbSR 160 mg once daily.   0    spironolactone (ALDACTONE) 25 MG tablet Take 25 mg by mouth 2 (two) times daily.      terconazole (TERAZOL 7) 0.4 % Crea USE ONE APPLICATORFUL VAGINALLY daily AT BEDTIME FOR 7 DAYS      traZODone (DESYREL) 50 MG tablet take one Tablet by mouth once a day at bedtime AS NEEDED FOR SLEEP      TRILEPTAL 150 mg Tab Take 150 mg by mouth 2 (two) times daily.      TRUE METRIX GLUCOSE METER Misc USE AS DIRECTED TO check blood glucose FIVE TIMES DAILY  0    TRUE METRIX GLUCOSE TEST STRIP Strp       TRUEPLUS LANCETS 30 gauge Misc USE TO CHECK BLOOD SUGAR FIVE TIMES DAILY  5    VRAYLAR 4.5 mg Cap Take 1 capsule by mouth every morning.      zolpidem (AMBIEN) 10 mg Tab Take 10 mg by mouth nightly as needed.      gabapentin (NEURONTIN) 600 MG tablet Take 1 tablet (600 mg total) by mouth 3 (three) times daily. 90 tablet 2     Current Facility-Administered Medications on File Prior to Visit   Medication Dose Route Frequency Provider Last Rate Last Admin    electrolyte-S (ISOLYTE)   Intravenous Continuous Ritesh Adkins MD        lactated ringers infusion   Intravenous Continuous Ritesh Adkins MD 10 mL/hr at 07/17/20 0643 New Bag at 07/17/20 0643    lidocaine (PF) 10 mg/ml (1%) injection 10 mg  1 mL Intradermal Once Ritesh Adkins MD        sodium chloride 0.9% flush 10 mL  10 mL Intravenous PRN Willard Candelario MD        sodium chloride 0.9% flush 3 mL  3 mL Intravenous Q6H PRN Willard Candelario MD        sodium chloride 0.9% flush 3 mL  3 mL Intravenous Q6H PRN Willard Candelario MD         Review of  patient's allergies indicates:   Allergen Reactions    Keppra [levetiracetam]      Makes aggressive    Tramadol      Seizures    Xanax [alprazolam] Other (See Comments)     Makes aggressive     Family History   Problem Relation Age of Onset    Diabetes Mother     Diabetes Father     Hypertension Father     Heart disease Father     Heart attack Father      Social History     Socioeconomic History    Marital status:    Tobacco Use    Smoking status: Former Smoker     Packs/day: 0.50     Years: 3.00     Pack years: 1.50     Types: Cigarettes     Quit date:      Years since quittin.3    Smokeless tobacco: Never Used   Substance and Sexual Activity    Alcohol use: Yes     Comment: One glass of wine once a month    Drug use: No    Sexual activity: Yes     Partners: Male     Birth control/protection: None       Review of Systems:  Constitutional:  Denies fever or chills   Eyes:  Denies change in visual acuity   HENT:  Denies nasal congestion or sore throat   Respiratory:  Denies cough or shortness of breath   Cardiovascular:  Denies chest pain or edema   GI:  Denies abdominal pain, nausea, vomiting, bloody stools or diarrhea   :  Denies dysuria   Integument:  Denies rash   Neurologic:  Denies headache, focal weakness or sensory changes   Endocrine:  Denies polyuria or polydipsia   Lymphatic:  Denies swollen glands   Psychiatric:  Denies depression or anxiety     Physical Exam:   Constitutional:  Well developed, well nourished, no acute distress, non-toxic appearance   Integument:  Well hydrated, no rash   Lymphatic:  No lymphadenopathy noted   Neurologic:  Alert & oriented x 3, CN 2-12 normal, normal motor function, normal sensory function, no focal deficits noted   Psychiatric:  Speech and behavior appropriate   Gi: abdomen soft  Eyes: EOMI    R forearm  Exam performed same as contralateral side and is normal    L forearm  FROM. No point TTP noted. Overall normal alignment. Skin intact.  Compartments soft. NVI distally.     X-rays were performed today, personally reviewed by me and findings discussed with the patient.  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 RTC 6 months with repeat xrays.

## 2022-05-18 PROBLEM — N93.9 ABNORMAL UTERINE BLEEDING (AUB): Status: ACTIVE | Noted: 2022-05-18

## 2022-05-18 PROBLEM — Z30.2 STERILIZATION: Status: ACTIVE | Noted: 2021-07-08

## 2022-05-24 ENCOUNTER — PATIENT MESSAGE (OUTPATIENT)
Dept: ORTHOPEDICS | Facility: CLINIC | Age: 37
End: 2022-05-24
Payer: MEDICAID

## 2022-05-24 DIAGNOSIS — M79.632 LEFT FOREARM PAIN: Primary | ICD-10-CM

## 2022-05-24 DIAGNOSIS — M25.522 LEFT ELBOW PAIN: ICD-10-CM

## 2022-10-31 DIAGNOSIS — M79.632 LEFT FOREARM PAIN: Primary | ICD-10-CM

## 2022-11-07 ENCOUNTER — TELEPHONE (OUTPATIENT)
Dept: ORTHOPEDICS | Facility: CLINIC | Age: 37
End: 2022-11-07
Payer: MEDICAID

## 2022-11-07 NOTE — TELEPHONE ENCOUNTER
----- Message from Ana Hughes sent at 11/7/2022  1:26 PM CST -----  Contact: Patient's Spouse  Type: Needs Medical Advice  Who Called: Patient's Spouse  Best Call Back Number:[phone]  Additional Information: Patient's Spouse had surgery in 2020 and requesting to know what kind of plate is in, Please Advise, Please contact Patient's Spouse

## 2023-09-11 DIAGNOSIS — M79.632 LEFT FOREARM PAIN: Primary | ICD-10-CM

## 2023-09-14 ENCOUNTER — OFFICE VISIT (OUTPATIENT)
Dept: ORTHOPEDICS | Facility: CLINIC | Age: 38
End: 2023-09-14
Payer: MEDICAID

## 2023-09-14 ENCOUNTER — HOSPITAL ENCOUNTER (OUTPATIENT)
Dept: RADIOLOGY | Facility: HOSPITAL | Age: 38
Discharge: HOME | End: 2023-09-14
Attending: ORTHOPAEDIC SURGERY
Payer: MEDICAID

## 2023-09-14 DIAGNOSIS — M77.12 LEFT LATERAL EPICONDYLITIS: Primary | ICD-10-CM

## 2023-09-14 DIAGNOSIS — M79.632 LEFT FOREARM PAIN: ICD-10-CM

## 2023-09-14 PROCEDURE — 1160F RVW MEDS BY RX/DR IN RCRD: CPT | Mod: CPTII,,, | Performed by: ORTHOPAEDIC SURGERY

## 2023-09-14 PROCEDURE — 99999 PR PBB SHADOW E&M-EST. PATIENT-LVL III: ICD-10-PCS | Mod: PBBFAC,,, | Performed by: ORTHOPAEDIC SURGERY

## 2023-09-14 PROCEDURE — 73090 XR FOREARM LEFT: ICD-10-PCS | Mod: 26,LT,, | Performed by: RADIOLOGY

## 2023-09-14 PROCEDURE — 1159F PR MEDICATION LIST DOCUMENTED IN MEDICAL RECORD: ICD-10-PCS | Mod: CPTII,,, | Performed by: ORTHOPAEDIC SURGERY

## 2023-09-14 PROCEDURE — 99213 OFFICE O/P EST LOW 20 MIN: CPT | Mod: PBBFAC,PN,25 | Performed by: ORTHOPAEDIC SURGERY

## 2023-09-14 PROCEDURE — 73090 X-RAY EXAM OF FOREARM: CPT | Mod: 26,LT,, | Performed by: RADIOLOGY

## 2023-09-14 PROCEDURE — 1159F MED LIST DOCD IN RCRD: CPT | Mod: CPTII,,, | Performed by: ORTHOPAEDIC SURGERY

## 2023-09-14 PROCEDURE — 99214 PR OFFICE/OUTPT VISIT, EST, LEVL IV, 30-39 MIN: ICD-10-PCS | Mod: S$PBB,25,, | Performed by: ORTHOPAEDIC SURGERY

## 2023-09-14 PROCEDURE — 20605 DRAIN/INJ JOINT/BURSA W/O US: CPT | Mod: PBBFAC,PN,LT | Performed by: ORTHOPAEDIC SURGERY

## 2023-09-14 PROCEDURE — 99999PBSHW PR PBB SHADOW TECHNICAL ONLY FILED TO HB: Mod: PBBFAC,,,

## 2023-09-14 PROCEDURE — 99214 OFFICE O/P EST MOD 30 MIN: CPT | Mod: S$PBB,25,, | Performed by: ORTHOPAEDIC SURGERY

## 2023-09-14 PROCEDURE — 20605 INTERMEDIATE JOINT ASPIRATION/INJECTION: ICD-10-PCS | Mod: S$PBB,LT,, | Performed by: ORTHOPAEDIC SURGERY

## 2023-09-14 PROCEDURE — 99999 PR PBB SHADOW E&M-EST. PATIENT-LVL III: CPT | Mod: PBBFAC,,, | Performed by: ORTHOPAEDIC SURGERY

## 2023-09-14 PROCEDURE — 99999PBSHW PR PBB SHADOW TECHNICAL ONLY FILED TO HB: ICD-10-PCS | Mod: PBBFAC,,,

## 2023-09-14 PROCEDURE — 73090 X-RAY EXAM OF FOREARM: CPT | Mod: TC,PO,LT

## 2023-09-14 PROCEDURE — 1160F PR REVIEW ALL MEDS BY PRESCRIBER/CLIN PHARMACIST DOCUMENTED: ICD-10-PCS | Mod: CPTII,,, | Performed by: ORTHOPAEDIC SURGERY

## 2023-09-14 RX ADMIN — TRIAMCINOLONE ACETONIDE 40 MG: 40 INJECTION, SUSPENSION INTRA-ARTICULAR; INTRAMUSCULAR at 02:09

## 2023-09-19 RX ORDER — TRIAMCINOLONE ACETONIDE 40 MG/ML
40 INJECTION, SUSPENSION INTRA-ARTICULAR; INTRAMUSCULAR
Status: DISCONTINUED | OUTPATIENT
Start: 2023-09-14 | End: 2023-09-19 | Stop reason: HOSPADM

## 2023-09-19 NOTE — PROCEDURES
L lateral epicondyleIntermediate Joint Aspiration/Injection    Date/Time: 9/14/2023 2:45 PM    Performed by: Willard Candelario MD  Authorized by: Willard Candelario MD    Consent Done?:  Yes (Verbal)  Indications:  Pain  Site marked: The procedure site was marked    Timeout: Prior to procedure the correct patient, procedure, and site was verified      Location:  Elbow  Prep: Patient was prepped and draped in usual sterile fashion    Needle size:  25 G  Medications:  40 mg triamcinolone acetonide 40 mg/mL  Patient tolerance:  Patient tolerated the procedure well with no immediate complications

## 2023-09-19 NOTE — PROGRESS NOTES
Chief Complaint   Patient presents with    Left Forearm - Pain    Left Elbow - Pain       HPI:    This is a 38 y.o. who presents today complaining of left elbow pain for 2 weeks after no known trauma. Pain is throbbing. No numbness or tingling. No associated signs or symptoms.      Past Medical History:   Diagnosis Date    Anxiety     Depression     Diabetes mellitus     Environmental allergies     Hyperlipidemia     Hypertension     Migraine     Seizures     last reported 2019    Thyroid disease       Past Surgical History:   Procedure Laterality Date    ABDOMINAL SURGERY           SECTION      INJECTION OF ANESTHETIC AGENT AROUND NERVE Left 2019    Procedure: Block, Nerve STELLATE GANGLION;  Surgeon: Tree Tapia MD;  Location: [facility] OR;  Service: Pain Management;  Laterality: Left;    LAPAROSCOPIC LIGATION OF FALLOPIAN TUBE Bilateral 2022    Procedure: LIGATION, FALLOPIAN TUBE, LAPAROSCOPIC;  Surgeon: Andres Gentile MD;  Location: [facility];  Service: OB/GYN;  Laterality: Bilateral;    OPEN REDUCTION AND INTERNAL FIXATION (ORIF) OF FRACTURE OF RADIUS Left 2019    Procedure: ORIF, FRACTURE, RADIAL SHAFT;  Surgeon: Ravi Panchal MD;  Location: [facility] OR;  Service: Orthopedics;  Laterality: Left;    OPEN REDUCTION AND INTERNAL FIXATION (ORIF) OF FRACTURE OF RADIUS Left 2019    Procedure: ORIF, FRACTURE, RADIUS;  Surgeon: Willard Candelario MD;  Location: [facility] OR;  Service: Orthopedics;  Laterality: Left;   REVISION ORIF RADIUS  General with block    OPEN REDUCTION AND INTERNAL FIXATION (ORIF) OF FRACTURE OF RADIUS Left 2019    Procedure: Revision ORIF, FRACTURE, RADIAL SHAFT;  Surgeon: Willard Candelario MD;  Location: [facility] OR;  Service: Orthopedics;  Laterality: Left;    OPEN REDUCTION AND INTERNAL FIXATION (ORIF) OF FRACTURE OF RADIUS Left 2020    Procedure: ORIF, FRACTURE, RADIUS Revision;  Surgeon: Willard Candelario MD;  Location: [facility] OR;  Service: Orthopedics;   Laterality: Left;    OPEN REDUCTION AND INTERNAL FIXATION (ORIF) OF FRACTURE OF RADIUS Left 7/9/2021    Procedure: ORIF, FRACTURE, RADIUS;  Surgeon: Willard Candelario MD;  Location: [facility] OR;  Service: Orthopedics;  Laterality: Left;  revision    THERMAL ABLATION OF ENDOMETRIUM USING HYSTEROSCOPY N/A 5/18/2022    Procedure: ABLATION, ENDOMETRIUM, THERMAL, HYSTEROSCOPIC;  Surgeon: Andres Gentile MD;  Location: [facility];  Service: OB/GYN;  Laterality: N/A;      Current Outpatient Medications on File Prior to Visit   Medication Sig Dispense Refill    atorvastatin (LIPITOR) 40 MG tablet Take 40 mg by mouth nightly.      ENBRACE HR 1.5 mg iron- 8.73 mg-6.4 mg capsule Take 1 capsule by mouth every morning.      ergocalciferol (ERGOCALCIFEROL) 50,000 unit Cap Take 50,000 Units by mouth every 7 days.  0    EScitalopram oxalate (LEXAPRO) 10 MG tablet once daily.      fluticasone propionate (FLONASE) 50 mcg/actuation nasal spray 2 (two) times daily as needed.      HYDROcodone-acetaminophen (NORCO) 5-325 mg per tablet Take 1 tablet by mouth every 6 (six) hours as needed for Pain. 20 tablet 0    ibuprofen (ADVIL,MOTRIN) 800 MG tablet Take 1 tablet (800 mg total) by mouth 3 (three) times daily. 40 tablet 2    INGREZZA 80 mg Cap Take 1 capsule by mouth once daily.      levothyroxine (SYNTHROID) 50 MCG tablet Take 50 mcg by mouth once daily.  2    methocarbamoL (ROBAXIN) 750 MG Tab Take 1 tablet (750 mg total) by mouth 4 (four) times daily as needed (spasm). 44 tablet 3    metoprolol tartrate (LOPRESSOR) 50 MG tablet Take 50 mg by mouth 2 (two) times daily.      montelukast (SINGULAIR) 10 mg tablet Take 10 mg by mouth every evening.  2    OXcarbazepine (TRILEPTAL) 600 MG Tab Take 600 mg by mouth 2 (two) times daily.       pantoprazole (PROTONIX) 40 MG tablet Take 40 mg by mouth once daily.      rOPINIRole (REQUIP) 0.5 MG tablet Take by mouth. PRN      spironolactone (ALDACTONE) 25 MG tablet Take 25 mg by mouth 2 (two) times daily.       traZODone (DESYREL) 50 MG tablet take one Tablet by mouth once a day at bedtime AS NEEDED FOR SLEEP      TRILEPTAL 150 mg Tab Take 150 mg by mouth 2 (two) times daily.      TRUE METRIX GLUCOSE METER Misc USE AS DIRECTED TO check blood glucose FIVE TIMES DAILY  0    TRUE METRIX GLUCOSE TEST STRIP Strp       TRUEPLUS LANCETS 30 gauge Misc USE TO CHECK BLOOD SUGAR FIVE TIMES DAILY  5    VRAYLAR 4.5 mg Cap Take 1 capsule by mouth every morning. 3.5MG      zolpidem (AMBIEN) 10 mg Tab Take 10 mg by mouth nightly as needed.       Current Facility-Administered Medications on File Prior to Visit   Medication Dose Route Frequency Provider Last Rate Last Admin    electrolyte-S (ISOLYTE)   Intravenous Continuous Ritesh Adkins MD        lactated ringers infusion   Intravenous Continuous Ritesh Adkins MD 10 mL/hr at 20 0643 New Bag at 22 1029    lidocaine (PF) 10 mg/ml (1%) injection 10 mg  1 mL Intradermal Once Ritesh Adkins MD        sodium chloride 0.9% flush 10 mL  10 mL Intravenous PRN Willard Candelario MD        sodium chloride 0.9% flush 3 mL  3 mL Intravenous Q6H PRN Willard Candelario MD        sodium chloride 0.9% flush 3 mL  3 mL Intravenous Q6H PRN Willard Candelario MD          Review of patient's allergies indicates:   Allergen Reactions    Keppra [levetiracetam]      Makes aggressive    Tramadol      Seizures    Xanax [alprazolam] Other (See Comments)     Makes aggressive      Family History not pertinent   Social History     Socioeconomic History    Marital status:    Tobacco Use    Smoking status: Former     Current packs/day: 0.00     Average packs/day: 0.5 packs/day for 3.0 years (1.5 ttl pk-yrs)     Types: Cigarettes     Start date:      Quit date: 2011     Years since quittin.7    Smokeless tobacco: Never   Substance and Sexual Activity    Alcohol use: Not Currently    Drug use: No    Sexual activity: Yes     Partners: Male     Birth control/protection: None         Review  of Systems:   Constitutional:  Denies fever or chills    Eyes:  Denies change in visual acuity    HENT:  Denies nasal congestion or sore throat    Respiratory:  Denies cough or shortness of breath    Cardiovascular:  Denies chest pain or edema    GI:  Denies abdominal pain, nausea, vomiting, bloody stools or diarrhea    :  Denies dysuria    Integument:  Denies rash    Neurologic:  Denies headache, focal weakness or sensory changes    Endocrine:  Denies polyuria or polydipsia    Lymphatic:  Denies swollen glands    Psychiatric:  Denies depression or anxiety       Physical Exam:    Constitutional:  Well developed, well nourished, no acute distress, non-toxic appearance    Integument:  Well hydrated, no rash    Lymphatic:  No lymphadenopathy noted    Neurologic:  Alert & oriented x 3,     Psychiatric:  Speech and behavior appropriate    Gi: abdomen soft  Eyes: EOMI   RUE  Exam performed same as contralateral side and is normal  LUE  Point TTP about the lateral epicondyle. Pain with wrist extension. Overall normal alignment. Skin intact. Compartments soft. NVI distally.      X-rays were performed today, personally reviewed by me and findings discussed with the patient.   3 views of the left elbow show no fractures or dislocations      Left lateral epicondylitis          Using an aseptic technique, I injected 1 cc of lidocaine 1% without and 1 cc of kenalog 40mg into the left lateral epicondyle. The patient tolerated this well. I will have them return to clinic as needed.

## 2024-02-12 ENCOUNTER — TELEPHONE (OUTPATIENT)
Dept: RESEARCH | Facility: OTHER | Age: 39
End: 2024-02-12
Payer: MEDICAID

## 2024-02-12 NOTE — TELEPHONE ENCOUNTER
Study Title: Transcending COVID-19 barriers to pain care in [place]: Pragmatic comparative effectiveness trial of evidence-based, on-demand, digital behavioral treatments for chronic pain.  Sponsor:  [facility]   Study: [facility] Digital Pain Treatment Study - Transcending COVID-19 barriers to pain care in [place]: Pragmatic comparative effectiveness trial of evidence-based, on-demand, digital behavioral treatments for chronic pain.   IRB/Protocol #: 2021.177 - Phase 2?  Study#([facility]):  [id number]  Principle Investigator - Josue Frances   Sub-Investigator - Ricky Hamilton   Sponsor:  [facility] Screening Interest in Study Call    Attempt #: 1, 2, 3+: 1      1. Contact Made: [x]Yes []No   1A. If yes, contact date: [id number]  1B. If no, date of final contact attempt:   1C. If no, reason(s) contact not made:  []Wrong number []No response []Other   1D. If other, please specify:     2. Verbal commitment: [x]Yes  []No  2A. If yes, verbal commitment date: [id number]  2B. If no, reason: []Exclusion Criteria Met  []Desire not to participate []No reason provided []Other  2B1. If Type of Exclusion Criteria Met or other reason, specify:     If patient expressed interest, patient's information will be forwarded to Cristina Solorio, Lit Wilson, or Dolores Burgess.

## 2024-02-27 ENCOUNTER — TELEPHONE (OUTPATIENT)
Dept: RESEARCH | Facility: OTHER | Age: 39
End: 2024-02-27
Payer: MEDICAID

## 2024-02-27 NOTE — TELEPHONE ENCOUNTER
Study Title: Transcending COVID-19 barriers to pain care in [place]: Pragmatic comparative effectiveness trial of evidence-based, on-demand, digital behavioral treatments for chronic pain.  Sponsor:  [facility]   Study: [facility] Digital Pain Treatment Study - Transcending COVID-19 barriers to pain care in [place]: Pragmatic comparative effectiveness trial of evidence-based, on-demand, digital behavioral treatments for chronic pain.   IRB/Protocol #: 2021.177 - Phase 2?  Study#([facility]):  [id number]  Principle Investigator - Josue Frances   Sub-Investigator - Ricky Hamilton   Sponsor:  [facility]      I called and informed the subject about the study being full. I let her know that if they allow us to re-enroll we will give her a call.

## 2024-08-13 ENCOUNTER — HOSPITAL ENCOUNTER (OUTPATIENT)
Dept: RADIOLOGY | Facility: HOSPITAL | Age: 39
Discharge: HOME | End: 2024-08-13
Attending: ORTHOPAEDIC SURGERY
Payer: MEDICAID

## 2024-08-13 ENCOUNTER — OFFICE VISIT (OUTPATIENT)
Dept: ORTHOPEDICS | Facility: CLINIC | Age: 39
End: 2024-08-13
Payer: MEDICAID

## 2024-08-13 VITALS — BODY MASS INDEX: 44.55 KG/M2 | HEIGHT: 62 IN | WEIGHT: 242.06 LBS

## 2024-08-13 DIAGNOSIS — M79.602 LEFT ARM PAIN: ICD-10-CM

## 2024-08-13 DIAGNOSIS — M77.12 LEFT LATERAL EPICONDYLITIS: ICD-10-CM

## 2024-08-13 DIAGNOSIS — M79.602 LEFT ARM PAIN: Primary | ICD-10-CM

## 2024-08-13 PROCEDURE — 3008F BODY MASS INDEX DOCD: CPT | Mod: CPTII,,, | Performed by: ORTHOPAEDIC SURGERY

## 2024-08-13 PROCEDURE — 73090 X-RAY EXAM OF FOREARM: CPT | Mod: 26,LT,, | Performed by: RADIOLOGY

## 2024-08-13 PROCEDURE — 99999 PR PBB SHADOW E&M-EST. PATIENT-LVL IV: CPT | Mod: PBBFAC,,, | Performed by: ORTHOPAEDIC SURGERY

## 2024-08-13 PROCEDURE — 99214 OFFICE O/P EST MOD 30 MIN: CPT | Mod: S$PBB,25,, | Performed by: ORTHOPAEDIC SURGERY

## 2024-08-13 PROCEDURE — 1160F RVW MEDS BY RX/DR IN RCRD: CPT | Mod: CPTII,,, | Performed by: ORTHOPAEDIC SURGERY

## 2024-08-13 PROCEDURE — 20605 DRAIN/INJ JOINT/BURSA W/O US: CPT | Mod: PBBFAC,PO | Performed by: ORTHOPAEDIC SURGERY

## 2024-08-13 PROCEDURE — 73090 X-RAY EXAM OF FOREARM: CPT | Mod: TC,PO,LT

## 2024-08-13 PROCEDURE — 99214 OFFICE O/P EST MOD 30 MIN: CPT | Mod: PBBFAC,25,PO | Performed by: ORTHOPAEDIC SURGERY

## 2024-08-13 PROCEDURE — 1159F MED LIST DOCD IN RCRD: CPT | Mod: CPTII,,, | Performed by: ORTHOPAEDIC SURGERY

## 2024-08-13 PROCEDURE — 99999PBSHW PR PBB SHADOW TECHNICAL ONLY FILED TO HB: Mod: PBBFAC,,,

## 2024-08-13 RX ADMIN — TRIAMCINOLONE ACETONIDE 40 MG: 40 INJECTION, SUSPENSION INTRA-ARTICULAR; INTRAMUSCULAR at 01:08

## 2024-08-22 RX ORDER — TRIAMCINOLONE ACETONIDE 40 MG/ML
40 INJECTION, SUSPENSION INTRA-ARTICULAR; INTRAMUSCULAR
Status: DISCONTINUED | OUTPATIENT
Start: 2024-08-13 | End: 2024-08-22 | Stop reason: HOSPADM

## 2024-08-22 NOTE — PROCEDURES
L lateral epicondyleIntermediate Joint Aspiration/Injection    Date/Time: 8/13/2024 1:00 PM    Performed by: Willard Candelario MD  Authorized by: Willard Candelario MD    Consent Done?:  Yes (Verbal)  Indications:  Pain  Site marked: The procedure site was marked    Timeout: Prior to procedure the correct patient, procedure, and site was verified      Location:  Elbow  Prep: Patient was prepped and draped in usual sterile fashion    Needle size:  25 G  Medications:  40 mg triamcinolone acetonide 40 mg/mL  Patient tolerance:  Patient tolerated the procedure well with no immediate complications

## 2024-08-22 NOTE — PROGRESS NOTES
Chief Complaint   Patient presents with    Left Forearm - Pain       HPI:    This is a 39 y.o. who presents today complaining of left elbow and forearm pain for 2 weeks after no interval trauma. Pain is dull. No numbness or tingling. No associated signs or symptoms.      Past Medical History:   Diagnosis Date    Anxiety     Depression     Diabetes mellitus     Environmental allergies     Hyperlipidemia     Hypertension     Migraine     Seizures     last reported 2019    Thyroid disease       Past Surgical History:   Procedure Laterality Date    ABDOMINAL SURGERY           SECTION      INJECTION OF ANESTHETIC AGENT AROUND NERVE Left 2019    Procedure: Block, Nerve STELLATE GANGLION;  Surgeon: Tree Tapia MD;  Location: [facility] OR;  Service: Pain Management;  Laterality: Left;    LAPAROSCOPIC LIGATION OF FALLOPIAN TUBE Bilateral 2022    Procedure: LIGATION, FALLOPIAN TUBE, LAPAROSCOPIC;  Surgeon: Andres Gentile MD;  Location: [facility];  Service: OB/GYN;  Laterality: Bilateral;    OPEN REDUCTION AND INTERNAL FIXATION (ORIF) OF FRACTURE OF RADIUS Left 2019    Procedure: ORIF, FRACTURE, RADIAL SHAFT;  Surgeon: Ravi Panchal MD;  Location: [facility] OR;  Service: Orthopedics;  Laterality: Left;    OPEN REDUCTION AND INTERNAL FIXATION (ORIF) OF FRACTURE OF RADIUS Left 2019    Procedure: ORIF, FRACTURE, RADIUS;  Surgeon: Willard Candelario MD;  Location: [facility] OR;  Service: Orthopedics;  Laterality: Left;   REVISION ORIF RADIUS  General with block    OPEN REDUCTION AND INTERNAL FIXATION (ORIF) OF FRACTURE OF RADIUS Left 2019    Procedure: Revision ORIF, FRACTURE, RADIAL SHAFT;  Surgeon: Willard Candelario MD;  Location: [facility] OR;  Service: Orthopedics;  Laterality: Left;    OPEN REDUCTION AND INTERNAL FIXATION (ORIF) OF FRACTURE OF RADIUS Left 2020    Procedure: ORIF, FRACTURE, RADIUS Revision;  Surgeon: Willard Candelario MD;  Location: [facility] OR;  Service: Orthopedics;  Laterality:  Left;    OPEN REDUCTION AND INTERNAL FIXATION (ORIF) OF FRACTURE OF RADIUS Left 7/9/2021    Procedure: ORIF, FRACTURE, RADIUS;  Surgeon: Willard Candelario MD;  Location: [facility] OR;  Service: Orthopedics;  Laterality: Left;  revision    THERMAL ABLATION OF ENDOMETRIUM USING HYSTEROSCOPY N/A 5/18/2022    Procedure: ABLATION, ENDOMETRIUM, THERMAL, HYSTEROSCOPIC;  Surgeon: Andres Gentile MD;  Location: [facility];  Service: OB/GYN;  Laterality: N/A;      Current Outpatient Medications on File Prior to Visit   Medication Sig Dispense Refill    atorvastatin (LIPITOR) 40 MG tablet Take 40 mg by mouth nightly.      ENBRACE HR 1.5 mg iron- 8.73 mg-6.4 mg capsule Take 1 capsule by mouth every morning.      ergocalciferol (ERGOCALCIFEROL) 50,000 unit Cap Take 50,000 Units by mouth every 7 days.  0    EScitalopram oxalate (LEXAPRO) 10 MG tablet once daily.      levothyroxine (SYNTHROID) 50 MCG tablet Take 50 mcg by mouth once daily.  2    metoprolol tartrate (LOPRESSOR) 50 MG tablet Take 50 mg by mouth 2 (two) times daily.      montelukast (SINGULAIR) 10 mg tablet Take 10 mg by mouth every evening.  2    OXcarbazepine (TRILEPTAL) 600 MG Tab Take 600 mg by mouth 2 (two) times daily.       pantoprazole (PROTONIX) 40 MG tablet Take 40 mg by mouth once daily.      rOPINIRole (REQUIP) 0.5 MG tablet Take by mouth. PRN      spironolactone (ALDACTONE) 25 MG tablet Take 25 mg by mouth 2 (two) times daily.      TRUE METRIX GLUCOSE METER Misc USE AS DIRECTED TO check blood glucose FIVE TIMES DAILY  0    TRUE METRIX GLUCOSE TEST STRIP Strp       TRUEPLUS LANCETS 30 gauge Misc USE TO CHECK BLOOD SUGAR FIVE TIMES DAILY  5    fluticasone propionate (FLONASE) 50 mcg/actuation nasal spray 2 (two) times daily as needed.      HYDROcodone-acetaminophen (NORCO) 5-325 mg per tablet Take 1 tablet by mouth every 6 (six) hours as needed for Pain. 20 tablet 0    ibuprofen (ADVIL,MOTRIN) 800 MG tablet Take 1 tablet (800 mg total) by mouth 3 (three) times  daily. 40 tablet 2    INGREZZA 80 mg Cap Take 1 capsule by mouth once daily.      methocarbamoL (ROBAXIN) 750 MG Tab Take 1 tablet (750 mg total) by mouth 4 (four) times daily as needed (spasm). 44 tablet 3    traZODone (DESYREL) 50 MG tablet take one Tablet by mouth once a day at bedtime AS NEEDED FOR SLEEP      TRILEPTAL 150 mg Tab Take 150 mg by mouth 2 (two) times daily.      VRAYLAR 4.5 mg Cap Take 1 capsule by mouth every morning. 3.5MG      zolpidem (AMBIEN) 10 mg Tab Take 10 mg by mouth nightly as needed.       Current Facility-Administered Medications on File Prior to Visit   Medication Dose Route Frequency Provider Last Rate Last Admin    electrolyte-S (ISOLYTE)   Intravenous Continuous Ritesh Adkins MD        lactated ringers infusion   Intravenous Continuous Ritesh Adkins MD 10 mL/hr at 20 0643 New Bag at 22 1029    lidocaine (PF) 10 mg/ml (1%) injection 10 mg  1 mL Intradermal Once Ritesh Adkins MD        sodium chloride 0.9% flush 10 mL  10 mL Intravenous PRN Willard Candelario MD        sodium chloride 0.9% flush 3 mL  3 mL Intravenous Q6H PRN Willard Candelario MD        sodium chloride 0.9% flush 3 mL  3 mL Intravenous Q6H PRN Willard Candelario MD          Review of patient's allergies indicates:   Allergen Reactions    Keppra [levetiracetam]      Makes aggressive    Tramadol      Seizures    Xanax [alprazolam] Other (See Comments)     Makes aggressive      Family History not pertinent   Social History     Socioeconomic History    Marital status:    Tobacco Use    Smoking status: Former     Current packs/day: 0.00     Average packs/day: 0.5 packs/day for 3.0 years (1.5 ttl pk-yrs)     Types: Cigarettes     Start date:      Quit date:      Years since quittin.6    Smokeless tobacco: Never   Substance and Sexual Activity    Alcohol use: Not Currently    Drug use: No    Sexual activity: Yes     Partners: Male     Birth control/protection: None     Social Determinants  of Health     Financial Resource Strain: Low Risk  (2/14/2024)    Overall Financial Resource Strain (CARDIA)     Difficulty of Paying Living Expenses: Not hard at all   Food Insecurity: No Food Insecurity (2/14/2024)    Hunger Vital Sign     Worried About Running Out of Food in the Last Year: Never true     Ran Out of Food in the Last Year: Never true   Transportation Needs: No Transportation Needs (2/14/2024)    PRAPARE - Transportation     Lack of Transportation (Medical): No     Lack of Transportation (Non-Medical): No   Physical Activity: Unknown (2/14/2024)    Exercise Vital Sign     Days of Exercise per Week: 0 days     Minutes of Exercise per Session: Patient declined   Stress: Stress Concern Present (2/14/2024)    [employer] - Occupational Stress Questionnaire     Feeling of Stress : Very much   Housing Stability: Unknown (2/14/2024)    Housing Stability Vital Sign     Unable to Pay for Housing in the Last Year: Patient declined     Unstable Housing in the Last Year: No         Review of Systems:   Constitutional:  Denies fever or chills    Eyes:  Denies change in visual acuity    HENT:  Denies nasal congestion or sore throat    Respiratory:  Denies cough or shortness of breath    Cardiovascular:  Denies chest pain or edema    GI:  Denies abdominal pain, nausea, vomiting, bloody stools or diarrhea    :  Denies dysuria    Integument:  Denies rash    Neurologic:  Denies headache, focal weakness or sensory changes    Endocrine:  Denies polyuria or polydipsia    Lymphatic:  Denies swollen glands    Psychiatric:  Denies depression or anxiety       Physical Exam:    Constitutional:  Well developed, well nourished, no acute distress, non-toxic appearance    Integument:  Well hydrated, no rash    Lymphatic:  No lymphadenopathy noted    Neurologic:  Alert & oriented x 3,     Psychiatric:  Speech and behavior appropriate    Gi: abdomen soft  Eyes: EOMI   R elbow  Exam performed same as  contralateral side and is normal  L elbow  Point TTP about the extensor origin. Pain with wrist extension. Skin intact. NVI distally.      X-rays were performed today, personally reviewed by me and findings discussed with the patient.   2 views of the left forearm show healed fracture       Left arm pain  -     X-Ray Forearm Left; Future; Expected date: 08/13/2024    Left lateral epicondylitis          Using an aseptic technique, I injected 1 cc of lidocaine 1% without and 1 cc of kenalog 40mg into the left elbow. The patient tolerated this well. I will have them return to clinic as needed.

## 2024-10-15 NOTE — ANESTHESIA POSTPROCEDURE EVALUATION
Anesthesia Post Evaluation    Patient: April C Reagan    Procedure(s) Performed: Procedure(s) (LRB):  ORIF, FRACTURE, RADIUS Revision (Left)    Final Anesthesia Type: general    Patient location during evaluation: GI PACU  Patient participation: Yes- Able to Participate  Level of consciousness: awake and alert and oriented  Post-procedure vital signs: reviewed and stable  Airway patency: patent  PILLO mitigation strategies: Use of major conduction anesthesia (spinal/epidural) or peripheral nerve block and Multimodal analgesia  PONV status at discharge: No PONV  Anesthetic complications: no      Cardiovascular status: blood pressure returned to baseline  Respiratory status: unassisted, room air and spontaneous ventilation  Hydration status: euvolemic  Follow-up not needed.          Vitals Value Taken Time   /78 07/17/20 1005   Temp 36.7 °C (98 °F) 07/17/20 1005   Pulse 78 07/17/20 1005   Resp 16 07/17/20 1005   SpO2 100 % 07/17/20 1005         Event Time   Out of Recovery 09:24:00         Pain/Haleigh Score: Haleigh Score: 10 (7/17/2020  9:35 AM)        
Instructions: This plan will send the code FBSE to the PM system.  DO NOT or CHANGE the price.
Detail Level: Simple
Price (Do Not Change): 0.00

## 2025-05-08 ENCOUNTER — OFFICE VISIT (OUTPATIENT)
Dept: ORTHOPEDICS | Facility: CLINIC | Age: 40
End: 2025-05-08
Payer: MEDICAID

## 2025-05-08 VITALS
HEIGHT: 62 IN | WEIGHT: 242.06 LBS | DIASTOLIC BLOOD PRESSURE: 79 MMHG | SYSTOLIC BLOOD PRESSURE: 134 MMHG | HEART RATE: 89 BPM | BODY MASS INDEX: 44.55 KG/M2

## 2025-05-08 DIAGNOSIS — M77.12 LEFT LATERAL EPICONDYLITIS: Primary | ICD-10-CM

## 2025-05-08 DIAGNOSIS — Z98.890 HISTORY OF SURGERY ON ARM: ICD-10-CM

## 2025-05-08 DIAGNOSIS — M77.8 LEFT WRIST TENDINITIS: ICD-10-CM

## 2025-05-08 PROCEDURE — 99213 OFFICE O/P EST LOW 20 MIN: CPT | Mod: PBBFAC,PO | Performed by: NURSE PRACTITIONER

## 2025-05-08 PROCEDURE — 3008F BODY MASS INDEX DOCD: CPT | Mod: CPTII,,, | Performed by: NURSE PRACTITIONER

## 2025-05-08 PROCEDURE — 99214 OFFICE O/P EST MOD 30 MIN: CPT | Mod: S$PBB,,, | Performed by: NURSE PRACTITIONER

## 2025-05-08 PROCEDURE — 99999 PR PBB SHADOW E&M-EST. PATIENT-LVL III: CPT | Mod: PBBFAC,,, | Performed by: NURSE PRACTITIONER

## 2025-05-08 PROCEDURE — 3075F SYST BP GE 130 - 139MM HG: CPT | Mod: CPTII,,, | Performed by: NURSE PRACTITIONER

## 2025-05-08 PROCEDURE — 3051F HG A1C>EQUAL 7.0%<8.0%: CPT | Mod: CPTII,,, | Performed by: NURSE PRACTITIONER

## 2025-05-08 PROCEDURE — 3078F DIAST BP <80 MM HG: CPT | Mod: CPTII,,, | Performed by: NURSE PRACTITIONER
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
## 2025-05-13 ENCOUNTER — HOSPITAL ENCOUNTER (OUTPATIENT)
Dept: RADIOLOGY | Facility: HOSPITAL | Age: 40
Discharge: HOME | End: 2025-05-13
Attending: PHYSICIAN ASSISTANT
Payer: MEDICAID

## 2025-05-13 ENCOUNTER — OFFICE VISIT (OUTPATIENT)
Dept: ORTHOPEDICS | Facility: CLINIC | Age: 40
End: 2025-05-13
Payer: MEDICAID

## 2025-05-13 DIAGNOSIS — M77.12 LEFT LATERAL EPICONDYLITIS: ICD-10-CM

## 2025-05-13 DIAGNOSIS — M77.8 LEFT WRIST TENDINITIS: ICD-10-CM

## 2025-05-13 DIAGNOSIS — Z98.890 HISTORY OF SURGERY ON ARM: ICD-10-CM

## 2025-05-13 PROCEDURE — 99999 PR PBB SHADOW E&M-EST. PATIENT-LVL III: CPT | Mod: PBBFAC,,, | Performed by: PHYSICIAN ASSISTANT

## 2025-05-13 PROCEDURE — 73080 X-RAY EXAM OF ELBOW: CPT | Mod: TC,PO,LT

## 2025-05-13 PROCEDURE — 99213 OFFICE O/P EST LOW 20 MIN: CPT | Mod: PBBFAC,25,PO | Performed by: PHYSICIAN ASSISTANT

## 2025-05-13 PROCEDURE — 73080 X-RAY EXAM OF ELBOW: CPT | Mod: 26,LT,, | Performed by: RADIOLOGY

## 2025-05-13 RX ORDER — MELOXICAM 15 MG/1
15 TABLET ORAL DAILY
Qty: 28 TABLET | Refills: 0 | Status: SHIPPED | OUTPATIENT
Start: 2025-05-13

## 2025-05-13 NOTE — PROGRESS NOTES
2025    HPI:  Alisha Kirk is a 40 y.o. female, who presents to clinic today for evaluation of her left elbow pain.  States pain is worse with activity.  States pain is better with rest.  States he does have a history of tennis elbow to the elbow.  States it comes and goes.  States that has worsened recently.  States he is here today to discuss possible treatment options.  Denies any acute injuries.  Denies any other complaints at this time.    PMHX:  Past Medical History:   Diagnosis Date    Anxiety     Depression     Diabetes mellitus     Environmental allergies     Hyperlipidemia     Hypertension     Migraine     Seizures     last reported 2019    Thyroid disease        PSHX:  Past Surgical History:   Procedure Laterality Date    ABDOMINAL SURGERY           SECTION      INJECTION OF ANESTHETIC AGENT AROUND NERVE Left 2019    Procedure: Block, Nerve STELLATE GANGLION;  Surgeon: Tree Tapia MD;  Location: [facility];  Service: Pain Management;  Laterality: Left;    LAPAROSCOPIC LIGATION OF FALLOPIAN TUBE Bilateral 2022    Procedure: LIGATION, FALLOPIAN TUBE, LAPAROSCOPIC;  Surgeon: Andres Gentile MD;  Location: [facility];  Service: OB/GYN;  Laterality: Bilateral;    OPEN REDUCTION AND INTERNAL FIXATION (ORIF) OF FRACTURE OF RADIUS Left 2019    Procedure: ORIF, FRACTURE, RADIAL SHAFT;  Surgeon: Ravi Panchal MD;  Location: [facility] OR;  Service: Orthopedics;  Laterality: Left;    OPEN REDUCTION AND INTERNAL FIXATION (ORIF) OF FRACTURE OF RADIUS Left 2019    Procedure: ORIF, FRACTURE, RADIUS;  Surgeon: Willard Candelario MD;  Location: [facility] OR;  Service: Orthopedics;  Laterality: Left;   REVISION ORIF RADIUS  General with block    OPEN REDUCTION AND INTERNAL FIXATION (ORIF) OF FRACTURE OF RADIUS Left 2019    Procedure: Revision ORIF, FRACTURE, RADIAL SHAFT;  Surgeon: Willard Candelario MD;  Location: [facility] OR;  Service: Orthopedics;  Laterality: Left;    OPEN  REDUCTION AND INTERNAL FIXATION (ORIF) OF FRACTURE OF RADIUS Left 2020    Procedure: ORIF, FRACTURE, RADIUS Revision;  Surgeon: Willard Candelario MD;  Location: [facility] OR;  Service: Orthopedics;  Laterality: Left;    OPEN REDUCTION AND INTERNAL FIXATION (ORIF) OF FRACTURE OF RADIUS Left 2021    Procedure: ORIF, FRACTURE, RADIUS;  Surgeon: Willard Candelario MD;  Location: [facility] OR;  Service: Orthopedics;  Laterality: Left;  revision    THERMAL ABLATION OF ENDOMETRIUM USING HYSTEROSCOPY N/A 2022    Procedure: ABLATION, ENDOMETRIUM, THERMAL, HYSTEROSCOPIC;  Surgeon: Andres Gentile MD;  Location: [facility];  Service: OB/GYN;  Laterality: N/A;       FMHX:  Family History   Problem Relation Name Age of Onset    Diabetes Mother      Diabetes Father      Hypertension Father      Heart disease Father      Heart attack Father         SOCHX:  Social History     Tobacco Use    Smoking status: Former     Current packs/day: 0.00     Average packs/day: 0.5 packs/day for 3.0 years (1.5 ttl pk-yrs)     Types: Cigarettes     Start date:      Quit date:      Years since quittin.3    Smokeless tobacco: Never   Substance Use Topics    Alcohol use: Not Currently       ALLERGIES:  Keppra [levetiracetam], Tramadol, and Xanax [alprazolam]    CURRENT MEDICATIONS:  Medications Ordered Prior to Encounter[1]    REVIEW OF SYSTEMS:  Review of Systems Complete; Negative, unless noted above.    GENERAL PHYSICAL EXAM:   There were no vitals taken for this visit.   GEN: well developed, well nourished, no acute distress   PULM: No wheezing, no respiratory distress   CV: RRR    ORTHO EXAM:   Examination of the left elbow/arm reveals no edema, erythema, ecchymosis, or skin breakdown.  Tenderness palpation over the area of the area of the common extensor tendon origin of the lateral epicondyle.  Tenderness with resisted wrist extension.  No significant tenderness palpation of the remainder of the left hand/arm.  Able make composite  fist and fully extend all fingers.  Able to flex extend the wrist appropriately.  Able to flex extend the elbow appropriately.  Normal sensation in the radial, ulnar, and median nerve distributions.  Capillary refill less than 2 seconds.    RADIOLOGY:   X-rays of the left elbow were taken today in clinic.  X-rays read by myself.  Imaging showed no acute fracture or dislocation.  No subluxation.  No radiopaque foreign body or mass noted no osseous destructive/erosive processes noted.  Presence of a orthopedic plate and screw hardware of the shaft of the radius.  No orthopedic hardware complications noted.  Presence of osseous spurring over the lateral epicondyle consistent with her history of lateral epicondylitis.  No other significant bony abnormalities noted.    ASSESSMENT:   Lateral epicondylitis of the left elbow    PLAN:  1. I discussed with Alisha Kirk the lateral epicondylitis pathology and treatment options in detail during today's visit.  After treatment options were discussed, we decided the best course of action this time is to proceed with a course of oral anti-inflammatories via meloxicam and splinting/bracing via a tennis elbow strap and short wrist brace.  She verbally agreed with the treatment plan     2.  She was prescribed meloxicam 15 mg to be taken once daily he is instructed to taking medication adverse effects he is instructed to not take any other type of NSAIDs while taking this medication.  She verbalized understanding     3. She was provided a tennis elbow strap and short wrist splint in clinic today.  She was instructed to wear the wrist splint for activity only.     4.  I would like her to follow up in clinic in 5 weeks for repeat evaluation.  She was instructed to contact the clinic for any problems or concerns in the interim.           [1]   Current Outpatient Medications on File Prior to Visit   Medication Sig Dispense Refill    atorvastatin (LIPITOR) 40 MG tablet Take 40 mg by  mouth nightly.      ENBRACE HR 1.5 mg iron- 8.73 mg-6.4 mg capsule Take 1 capsule by mouth every morning.      ergocalciferol (ERGOCALCIFEROL) 50,000 unit Cap Take 50,000 Units by mouth every 7 days.  0    levothyroxine (SYNTHROID) 50 MCG tablet Take 50 mcg by mouth once daily.  2    montelukast (SINGULAIR) 10 mg tablet Take 10 mg by mouth every evening.  2    OXcarbazepine (TRILEPTAL) 600 MG Tab Take 600 mg by mouth 2 (two) times daily.       pantoprazole (PROTONIX) 40 MG tablet Take 40 mg by mouth once daily.      rOPINIRole (REQUIP) 0.5 MG tablet Take by mouth. PRN      spironolactone (ALDACTONE) 25 MG tablet Take 25 mg by mouth 2 (two) times daily.      traZODone (DESYREL) 50 MG tablet take one Tablet by mouth once a day at bedtime AS NEEDED FOR SLEEP      [DISCONTINUED] ibuprofen (ADVIL,MOTRIN) 800 MG tablet Take 1 tablet (800 mg total) by mouth 3 (three) times daily. 40 tablet 2    EScitalopram oxalate (LEXAPRO) 10 MG tablet once daily.      HYDROcodone-acetaminophen (NORCO) 5-325 mg per tablet Take 1 tablet by mouth every 6 (six) hours as needed for Pain. 20 tablet 0    INGREZZA 80 mg Cap Take 1 capsule by mouth once daily.      metoprolol tartrate (LOPRESSOR) 50 MG tablet Take 50 mg by mouth 2 (two) times daily.      TRILEPTAL 150 mg Tab Take 150 mg by mouth 2 (two) times daily.      TRUE METRIX GLUCOSE METER Misc USE AS DIRECTED TO check blood glucose FIVE TIMES DAILY  0    TRUE METRIX GLUCOSE TEST STRIP Strp       TRUEPLUS LANCETS 30 gauge Misc USE TO CHECK BLOOD SUGAR FIVE TIMES DAILY  5    VRAYLAR 4.5 mg Cap Take 1 capsule by mouth every morning. 3.5MG      zolpidem (AMBIEN) 10 mg Tab Take 10 mg by mouth nightly as needed.      [DISCONTINUED] fluticasone propionate (FLONASE) 50 mcg/actuation nasal spray 2 (two) times daily as needed.      [DISCONTINUED] methocarbamoL (ROBAXIN) 750 MG Tab Take 1 tablet (750 mg total) by mouth 4 (four) times daily as needed (spasm). 44 tablet 3     Current  Facility-Administered Medications on File Prior to Visit   Medication Dose Route Frequency Provider Last Rate Last Admin    electrolyte-S (ISOLYTE)   Intravenous Continuous Ritesh Adkins MD        lactated ringers infusion   Intravenous Continuous Ritesh Adkins MD 10 mL/hr at 07/17/20 0643 New Bag at 05/18/22 1029    lidocaine (PF) 10 mg/ml (1%) injection 10 mg  1 mL Intradermal Once Ritesh Adkins MD        sodium chloride 0.9% flush 10 mL  10 mL Intravenous PRN Willard Candelario MD        sodium chloride 0.9% flush 3 mL  3 mL Intravenous Q6H PRN Willard Candelario MD        sodium chloride 0.9% flush 3 mL  3 mL Intravenous Q6H PRN Willard Candelario MD

## 2025-05-20 ENCOUNTER — TELEPHONE (OUTPATIENT)
Dept: ORTHOPEDICS | Facility: CLINIC | Age: 40
End: 2025-05-20
Payer: MEDICAID

## 2025-05-20 DIAGNOSIS — M77.12 LEFT LATERAL EPICONDYLITIS: Primary | ICD-10-CM

## 2025-06-17 ENCOUNTER — OFFICE VISIT (OUTPATIENT)
Dept: ORTHOPEDICS | Facility: CLINIC | Age: 40
End: 2025-06-17
Payer: MEDICAID

## 2025-06-17 DIAGNOSIS — M77.12 LATERAL EPICONDYLITIS OF LEFT ELBOW: Primary | ICD-10-CM

## 2025-06-17 PROCEDURE — 99213 OFFICE O/P EST LOW 20 MIN: CPT | Mod: PBBFAC,PO,25 | Performed by: PHYSICIAN ASSISTANT

## 2025-06-17 PROCEDURE — 99999PBSHW PR PBB SHADOW TECHNICAL ONLY FILED TO HB: Mod: PBBFAC,,,

## 2025-06-17 PROCEDURE — 20551 NJX 1 TENDON ORIGIN/INSJ: CPT | Mod: S$PBB,LT,, | Performed by: PHYSICIAN ASSISTANT

## 2025-06-17 PROCEDURE — 20551 NJX 1 TENDON ORIGIN/INSJ: CPT | Mod: PBBFAC,PO | Performed by: PHYSICIAN ASSISTANT

## 2025-06-17 PROCEDURE — 99213 OFFICE O/P EST LOW 20 MIN: CPT | Mod: 25,S$PBB,, | Performed by: PHYSICIAN ASSISTANT

## 2025-06-17 PROCEDURE — 99999 PR PBB SHADOW E&M-EST. PATIENT-LVL III: CPT | Mod: PBBFAC,,, | Performed by: PHYSICIAN ASSISTANT

## 2025-06-17 PROCEDURE — 3051F HG A1C>EQUAL 7.0%<8.0%: CPT | Mod: CPTII,,, | Performed by: PHYSICIAN ASSISTANT

## 2025-06-17 PROCEDURE — 1160F RVW MEDS BY RX/DR IN RCRD: CPT | Mod: CPTII,,, | Performed by: PHYSICIAN ASSISTANT

## 2025-06-17 PROCEDURE — 1159F MED LIST DOCD IN RCRD: CPT | Mod: CPTII,,, | Performed by: PHYSICIAN ASSISTANT

## 2025-06-17 RX ORDER — TRIAMCINOLONE ACETONIDE 40 MG/ML
40 INJECTION, SUSPENSION INTRA-ARTICULAR; INTRAMUSCULAR
Status: DISCONTINUED | OUTPATIENT
Start: 2025-06-17 | End: 2025-06-17 | Stop reason: HOSPADM

## 2025-06-17 RX ADMIN — TRIAMCINOLONE ACETONIDE 40 MG: 40 INJECTION, SUSPENSION INTRA-ARTICULAR; INTRAMUSCULAR at 10:06

## 2025-06-17 NOTE — PROCEDURES
Tendon Origin: L elbow    Date/Time: 6/17/2025 10:20 AM    Performed by: Nicko Lara PA-C  Authorized by: Nicko Lara PA-C    Consent Done?:  Yes (Verbal)  Timeout: prior to procedure the correct patient, procedure, and site was verified    Indications:  Pain  Site marked: the procedure site was marked    Timeout: prior to procedure the correct patient, procedure, and site was verified    Location:  Elbow  Site:  L elbow (Common extensor tendon origin)  Prep: patient was prepped and draped in usual sterile fashion    Needle size:  25 G  Approach:  Dorsal  Medications:  40 mg triamcinolone acetonide 40 mg/mL (40 mg injected)  Patient tolerance:  Patient tolerated the procedure well with no immediate complications

## 2025-06-17 NOTE — PROGRESS NOTES
2025    HPI:  Alisha Kirk is a 40 y.o. female, who presents to clinic today for continued evaluation of her lateral epicondylitis of the left elbow.  States he has tried the occupational therapy, anti-inflammatories, splinting/bracing, activity modification.  States he continues to have significant pain of the left elbow.  States pain is worse with activity.  States pain is better with rest.  Denies acute injuries.  Denies any other complaints at this time.    PMHX:  Past Medical History:   Diagnosis Date    Anxiety     Depression     Diabetes mellitus     Environmental allergies     Hyperlipidemia     Hypertension     Migraine     Seizures     last reported 2019    Thyroid disease        PSHX:  Past Surgical History:   Procedure Laterality Date    ABDOMINAL SURGERY           SECTION      INJECTION OF ANESTHETIC AGENT AROUND NERVE Left 2019    Procedure: Block, Nerve STELLATE GANGLION;  Surgeon: Tree Tapia MD;  Location: [facility] OR;  Service: Pain Management;  Laterality: Left;    LAPAROSCOPIC LIGATION OF FALLOPIAN TUBE Bilateral 2022    Procedure: LIGATION, FALLOPIAN TUBE, LAPAROSCOPIC;  Surgeon: Andres Gentile MD;  Location: [facility];  Service: OB/GYN;  Laterality: Bilateral;    OPEN REDUCTION AND INTERNAL FIXATION (ORIF) OF FRACTURE OF RADIUS Left 2019    Procedure: ORIF, FRACTURE, RADIAL SHAFT;  Surgeon: Ravi Panchal MD;  Location: [facility] OR;  Service: Orthopedics;  Laterality: Left;    OPEN REDUCTION AND INTERNAL FIXATION (ORIF) OF FRACTURE OF RADIUS Left 2019    Procedure: ORIF, FRACTURE, RADIUS;  Surgeon: Willard Candelario MD;  Location: [facility] OR;  Service: Orthopedics;  Laterality: Left;   REVISION ORIF RADIUS  General with block    OPEN REDUCTION AND INTERNAL FIXATION (ORIF) OF FRACTURE OF RADIUS Left 2019    Procedure: Revision ORIF, FRACTURE, RADIAL SHAFT;  Surgeon: Willard Candelario MD;  Location: [facility] OR;  Service: Orthopedics;  Laterality: Left;     OPEN REDUCTION AND INTERNAL FIXATION (ORIF) OF FRACTURE OF RADIUS Left 2020    Procedure: ORIF, FRACTURE, RADIUS Revision;  Surgeon: Willard Candelario MD;  Location: [facility] OR;  Service: Orthopedics;  Laterality: Left;    OPEN REDUCTION AND INTERNAL FIXATION (ORIF) OF FRACTURE OF RADIUS Left 2021    Procedure: ORIF, FRACTURE, RADIUS;  Surgeon: Willard Candelario MD;  Location: [facility] OR;  Service: Orthopedics;  Laterality: Left;  revision    THERMAL ABLATION OF ENDOMETRIUM USING HYSTEROSCOPY N/A 2022    Procedure: ABLATION, ENDOMETRIUM, THERMAL, HYSTEROSCOPIC;  Surgeon: Andres Gentile MD;  Location: [facility];  Service: OB/GYN;  Laterality: N/A;       FMHX:  Family History   Problem Relation Name Age of Onset    Diabetes Mother      Diabetes Father      Hypertension Father      Heart disease Father      Heart attack Father         SOCHX:  Social History     Tobacco Use    Smoking status: Former     Current packs/day: 0.00     Average packs/day: 0.5 packs/day for 3.0 years (1.5 ttl pk-yrs)     Types: Cigarettes     Start date:      Quit date:      Years since quittin.4    Smokeless tobacco: Never   Substance Use Topics    Alcohol use: Not Currently       ALLERGIES:  Keppra [levetiracetam], Tramadol, and Xanax [alprazolam]    CURRENT MEDICATIONS:  Medications Ordered Prior to Encounter[1]    REVIEW OF SYSTEMS:  Review of Systems Complete; Negative, unless noted above.    GENERAL PHYSICAL EXAM:   There were no vitals taken for this visit.   GEN: well developed, well nourished, no acute distress   PULM: No wheezing, no respiratory distress   CV: RRR    ORTHO EXAM:   Examination of the left elbow reveals mild edema over the common extensor tendon origin of the lateral epicondyle of the left elbow.  Tenderness palpation of the area.  Tenderness with resisted wrist extension and  strength testing.  Sensation is grossly intact in the radial, ulnar, median nerve distributions.  Capillary refill less  than 2 seconds.    RADIOLOGY:   None.    ASSESSMENT:   Lateral epicondylitis of the left elbow    PLAN:  1. I discussed with Alisha Kirk that considering she has failed conservative treatment, the best course of action this time is perform a steroid injection into the common extensor tendon origin of the left elbow in clinic today.  We discussed since he is a diabetic that has increased risk of the steroid injection elevating her blood glucose levels.  We discussed the importance of closely monitoring her blood glucose/levels over the next 3-5 days.  We did discuss for any uncontrolled levels greater than 300 to report to nearest emergency department.  She verbalized understanding and verbally agreed with the treatment plan.      2. Informed consent was obtained.  After an alcohol prep followed by a chlorhexidine prep, a steroid injection was placed into the common extensor tendon origin of the lateral epicondyle of the left elbow in clinic today.  She tolerated the procedure well with no immediate complications     3. I would like him to follow up in clinic on a PRN basis.  She was instructed to contact the clinic for any problems or concerns in the interim.         [1]   Current Outpatient Medications on File Prior to Visit   Medication Sig Dispense Refill    atorvastatin (LIPITOR) 40 MG tablet Take 40 mg by mouth nightly.      ENBRACE HR 1.5 mg iron- 8.73 mg-6.4 mg capsule Take 1 capsule by mouth every morning.      ergocalciferol (ERGOCALCIFEROL) 50,000 unit Cap Take 50,000 Units by mouth every 7 days.  0    levothyroxine (SYNTHROID) 50 MCG tablet Take 50 mcg by mouth once daily.  2    meloxicam (MOBIC) 15 MG tablet Take 1 tablet (15 mg total) by mouth once daily. 28 tablet 0    montelukast (SINGULAIR) 10 mg tablet Take 10 mg by mouth every evening.  2    OXcarbazepine (TRILEPTAL) 600 MG Tab Take 600 mg by mouth 2 (two) times daily.       pantoprazole (PROTONIX) 40 MG tablet Take 40 mg by mouth once daily.       rOPINIRole (REQUIP) 0.5 MG tablet Take by mouth. PRN      spironolactone (ALDACTONE) 25 MG tablet Take 25 mg by mouth 2 (two) times daily.      traZODone (DESYREL) 50 MG tablet take one Tablet by mouth once a day at bedtime AS NEEDED FOR SLEEP      EScitalopram oxalate (LEXAPRO) 10 MG tablet once daily.      INGREZZA 80 mg Cap Take 1 capsule by mouth once daily.      metoprolol tartrate (LOPRESSOR) 50 MG tablet Take 50 mg by mouth 2 (two) times daily.      TRILEPTAL 150 mg Tab Take 150 mg by mouth 2 (two) times daily.      TRUE METRIX GLUCOSE METER Misc USE AS DIRECTED TO check blood glucose FIVE TIMES DAILY  0    TRUE METRIX GLUCOSE TEST STRIP Strp       TRUEPLUS LANCETS 30 gauge Misc USE TO CHECK BLOOD SUGAR FIVE TIMES DAILY  5    VRAYLAR 4.5 mg Cap Take 1 capsule by mouth every morning. 3.5MG      zolpidem (AMBIEN) 10 mg Tab Take 10 mg by mouth nightly as needed.      [DISCONTINUED] HYDROcodone-acetaminophen (NORCO) 5-325 mg per tablet Take 1 tablet by mouth every 6 (six) hours as needed for Pain. 20 tablet 0     Current Facility-Administered Medications on File Prior to Visit   Medication Dose Route Frequency Provider Last Rate Last Admin    electrolyte-S (ISOLYTE)   Intravenous Continuous Ritesh Adkins MD        lactated ringers infusion   Intravenous Continuous Ritesh Adkins MD 10 mL/hr at 07/17/20 0643 New Bag at 05/18/22 1029    lidocaine (PF) 10 mg/ml (1%) injection 10 mg  1 mL Intradermal Once Ritesh Adkins MD        sodium chloride 0.9% flush 10 mL  10 mL Intravenous PRN Willard Candelario MD        sodium chloride 0.9% flush 3 mL  3 mL Intravenous Q6H PRN Willard Candelario MD        sodium chloride 0.9% flush 3 mL  3 mL Intravenous Q6H PRN Willard Candelario MD

## (undated) DEVICE — BIT DRILL AO 2.6X135MM SCALED

## (undated) DEVICE — SEE MEDLINE ITEM 153151

## (undated) DEVICE — ELECTRODE REM PLYHSV RETURN 9

## (undated) DEVICE — GAUZE SPONGE 4X4 12PLY

## (undated) DEVICE — BLADE SURG CARBON STEEL #10

## (undated) DEVICE — PENCIL ROCKER SWITCH 10FT CORD

## (undated) DEVICE — SEE MEDLINE ITEM 157173

## (undated) DEVICE — SEE MEDLINE ITEM 157131

## (undated) DEVICE — BIT BRILL 2.5

## (undated) DEVICE — GOWN SURG 2XL DISP TIE BACK

## (undated) DEVICE — DRESSING XEROFORM FOIL PK 1X8

## (undated) DEVICE — PAD CAST SPECIALIST STRL 4

## (undated) DEVICE — SEE MEDLINE ITEM 146313

## (undated) DEVICE — Device

## (undated) DEVICE — DRAPE C ARM 42 X 120 10/BX

## (undated) DEVICE — SUT 2/0 36IN COATED VICRYL

## (undated) DEVICE — DRAPE ORTH TIBURON 77X108IN

## (undated) DEVICE — DRAPE STERI-DRAPE 1000 17X11IN

## (undated) DEVICE — SCREW BONE NON LOCK 3.5X20MM
Type: IMPLANTABLE DEVICE | Site: ARM | Status: NON-FUNCTIONAL
Removed: 2019-08-02

## (undated) DEVICE — SEE MEDLINE ITEM 152622

## (undated) DEVICE — DRAPE PLASTIC U 60X72

## (undated) DEVICE — GLOVE 8.5 PROTEXIS PI BLUE

## (undated) DEVICE — SEE MEDLINE ITEM 146308

## (undated) DEVICE — SEE L#120831

## (undated) DEVICE — TOURNIQUET SB QC DP 18X4IN

## (undated) DEVICE — GLOVE PROTEXIS LTX MICRO 8

## (undated) DEVICE — SPONGE LAP 18X18 PREWASHED

## (undated) DEVICE — ELECTRODE BLD 1 INCH TEFLON

## (undated) DEVICE — APPLICATOR CHLORAPREP ORN 26ML